# Patient Record
Sex: MALE | Race: WHITE | Employment: UNEMPLOYED | URBAN - METROPOLITAN AREA
[De-identification: names, ages, dates, MRNs, and addresses within clinical notes are randomized per-mention and may not be internally consistent; named-entity substitution may affect disease eponyms.]

---

## 2017-01-03 RX ORDER — ATENOLOL 25 MG/1
TABLET ORAL
Qty: 180 TABLET | Refills: 0 | Status: SHIPPED | OUTPATIENT
Start: 2017-01-03 | End: 2017-11-13

## 2017-02-14 ENCOUNTER — APPOINTMENT (OUTPATIENT)
Dept: LAB | Age: 52
End: 2017-02-14
Attending: INTERNAL MEDICINE
Payer: COMMERCIAL

## 2017-02-14 DIAGNOSIS — Z00.00 PHYSICAL EXAM, ANNUAL: ICD-10-CM

## 2017-02-14 DIAGNOSIS — R79.89 ELEVATED LIVER FUNCTION TESTS: ICD-10-CM

## 2017-02-14 LAB
ALBUMIN SERPL-MCNC: 3.9 G/DL (ref 3.5–4.8)
ALP LIVER SERPL-CCNC: 100 U/L (ref 45–117)
ALT SERPL-CCNC: 95 U/L (ref 17–63)
AST SERPL-CCNC: 62 U/L (ref 15–41)
BILIRUB DIRECT SERPL-MCNC: 0.1 MG/DL (ref 0.1–0.5)
BILIRUB SERPL-MCNC: 0.4 MG/DL (ref 0.1–2)
M PROTEIN MFR SERPL ELPH: 7.8 G/DL (ref 6.1–8.3)

## 2017-02-14 PROCEDURE — 80061 LIPID PANEL: CPT

## 2017-02-15 ENCOUNTER — OFFICE VISIT (OUTPATIENT)
Dept: INTERNAL MEDICINE CLINIC | Facility: CLINIC | Age: 52
End: 2017-02-15

## 2017-02-15 VITALS
HEART RATE: 60 BPM | RESPIRATION RATE: 12 BRPM | TEMPERATURE: 98 F | SYSTOLIC BLOOD PRESSURE: 130 MMHG | WEIGHT: 304.38 LBS | HEIGHT: 69 IN | DIASTOLIC BLOOD PRESSURE: 82 MMHG | BODY MASS INDEX: 45.08 KG/M2

## 2017-02-15 DIAGNOSIS — Z00.00 PHYSICAL EXAM, ANNUAL: ICD-10-CM

## 2017-02-15 DIAGNOSIS — Z23 NEED FOR VACCINATION: ICD-10-CM

## 2017-02-15 LAB
CHOLEST SMN-MCNC: 230 MG/DL (ref ?–200)
HDLC SERPL-MCNC: 40 MG/DL (ref 45–?)
HDLC SERPL: 5.75 {RATIO} (ref ?–4.97)
LDLC SERPL CALC-MCNC: 154 MG/DL (ref ?–130)
NONHDLC SERPL-MCNC: 190 MG/DL (ref ?–130)
TRIGLYCERIDES: 179 MG/DL (ref ?–150)
VLDL: 36 MG/DL (ref 5–40)

## 2017-02-15 PROCEDURE — 90686 IIV4 VACC NO PRSV 0.5 ML IM: CPT | Performed by: INTERNAL MEDICINE

## 2017-02-15 PROCEDURE — 99396 PREV VISIT EST AGE 40-64: CPT | Performed by: INTERNAL MEDICINE

## 2017-02-15 PROCEDURE — 90471 IMMUNIZATION ADMIN: CPT | Performed by: INTERNAL MEDICINE

## 2017-02-15 PROCEDURE — 90472 IMMUNIZATION ADMIN EACH ADD: CPT | Performed by: INTERNAL MEDICINE

## 2017-02-15 PROCEDURE — 90714 TD VACC NO PRESV 7 YRS+ IM: CPT | Performed by: INTERNAL MEDICINE

## 2017-02-15 RX ORDER — ONDANSETRON 4 MG/1
TABLET, FILM COATED ORAL
Refills: 5 | COMMUNITY
Start: 2016-12-21

## 2017-02-15 RX ORDER — ZOLMITRIPTAN 5 MG/1
TABLET, ORALLY DISINTEGRATING ORAL
Refills: 5 | COMMUNITY
Start: 2016-12-22

## 2017-02-15 NOTE — PROGRESS NOTES
[facility]    HPI:   Kirsten Szymanski is a 54year old male who presents for a complete physical exam.      Htn: okay today. Taking meds regularly. Hyperlipidemia: diet control. Taking omega 3 supplements. Depression: seeing psych.      Drena Goodell Disp: 30 tablet Rfl: 0   DiphenhydrAMINE HCl (BENADRYL) 25 MG Oral Tab Take 25 mg by mouth every 6 (six) hours as needed.  Disp:  Rfl:       Past Medical History   Diagnosis Date   • Unspecified essential hypertension    • Environmental allergies    • Multi Status: Never Used                        Alcohol Use: No              Occ: marketing. : yes.    Exercise: minimal.  Diet: watches minimally     REVIEW OF SYSTEMS:   GENERAL: feels well otherwise  SKIN: denies any unusual skin lesions  EYES:denies bl CREATSERUM 0.92 11/18/2016 01:53 PM   CA 9.1 11/18/2016 01:53 PM   ALB 3.9 02/14/2017 01:07 PM   TP 7.8 02/14/2017 01:07 PM   ALKPHO 100 02/14/2017 01:07 PM   AST 62* 02/14/2017 01:07 PM   ALT 95* 02/14/2017 01:07 PM   BILT 0.4 02/14/2017 01:07 PM   TSH

## 2017-04-02 ENCOUNTER — HOSPITAL ENCOUNTER (EMERGENCY)
Facility: HOSPITAL | Age: 52
Discharge: HOME | End: 2017-04-02
Attending: EMERGENCY MEDICINE
Payer: COMMERCIAL

## 2017-04-02 ENCOUNTER — APPOINTMENT (OUTPATIENT)
Dept: GENERAL RADIOLOGY | Facility: HOSPITAL | Age: 52
End: 2017-04-02
Attending: EMERGENCY MEDICINE
Payer: COMMERCIAL

## 2017-04-02 VITALS
RESPIRATION RATE: 18 BRPM | DIASTOLIC BLOOD PRESSURE: 86 MMHG | HEIGHT: 70 IN | SYSTOLIC BLOOD PRESSURE: 135 MMHG | TEMPERATURE: 98 F | OXYGEN SATURATION: 97 % | BODY MASS INDEX: 42.52 KG/M2 | WEIGHT: 297 LBS | HEART RATE: 59 BPM

## 2017-04-02 DIAGNOSIS — M54.16 LUMBAR RADICULOPATHY: Primary | ICD-10-CM

## 2017-04-02 PROCEDURE — 99284 EMERGENCY DEPT VISIT MOD MDM: CPT

## 2017-04-02 PROCEDURE — 73502 X-RAY EXAM HIP UNI 2-3 VIEWS: CPT

## 2017-04-02 PROCEDURE — 72110 X-RAY EXAM L-2 SPINE 4/>VWS: CPT

## 2017-04-02 RX ORDER — CYCLOBENZAPRINE HCL 10 MG
10 TABLET ORAL 3 TIMES DAILY PRN
Qty: 20 TABLET | Refills: 0 | Status: SHIPPED | OUTPATIENT
Start: 2017-04-02 | End: 2017-04-07

## 2017-04-02 RX ORDER — HYDROCODONE BITARTRATE AND ACETAMINOPHEN 5; 325 MG/1; MG/1
1 TABLET ORAL ONCE
Status: COMPLETED | OUTPATIENT
Start: 2017-04-02 | End: 2017-04-02

## 2017-04-02 RX ORDER — NAPROXEN 500 MG/1
500 TABLET ORAL 2 TIMES DAILY PRN
Qty: 20 TABLET | Refills: 0 | Status: SHIPPED | OUTPATIENT
Start: 2017-04-02 | End: 2017-04-14

## 2017-04-02 RX ORDER — METHYLPREDNISOLONE 4 MG/1
TABLET ORAL
Qty: 1 PACKAGE | Refills: 0 | Status: SHIPPED | OUTPATIENT
Start: 2017-04-02 | End: 2017-04-07

## 2017-04-02 NOTE — ED INITIAL ASSESSMENT (HPI)
Pt reports unable to get out of bed yesterday due to severe shooting pain inside of right hip, radiating to groin and down the leg. Pt reports no pain with ambulation only when laying down or getting up from a laying positing.

## 2017-04-02 NOTE — ED PROVIDER NOTES
Patient Seen in: [facility] Emergency Department    History   Patient presents with:  Back Pain (musculoskeletal)    Stated Complaint: back pain/groin    HPI    Patient is a 79-year-old with a history of hypertension, migraines, obesity, MTHFR defic GI ENDOSCOPY,BIOPSY  5/27/2009       Medications :   Cyclobenzaprine HCl 10 MG Oral Tab,  Take 1 tablet (10 mg total) by mouth 3 (three) times daily as needed for Muscle spasms.    methylPREDNISolone 4 MG Oral Tablet Therapy Pack,  Dosepack: use as directed Maternal Grandmother    • s dementia[other] [OTHER] Maternal Grandmother    • alzheimer's dementia[other] [OTHER] Maternal Grandfather    • lung cancer[other] [OTHER] Maternal Aunt    • Heart Attack Maternal Uncle      smoker   • Heart Disease Neg degeneration with bilateral L5-S1 disc degeneration  FINDINGS: Osteophytes are noted at the L5-S1 junction. Mild disc space narrowing at L4-5. Vertebral height otherwise within normal limits. Small anterior osteophytes L2-L3 and at T12-L1.  No lytic or devan 2 (two) times daily as needed.   Qty: 20 tablet Refills: 0

## 2017-04-02 NOTE — ED NOTES
Pt sitting in wheelchair - has no needs at this time.  Pt is aware we are just waiting on xray results  Family in room with pt

## 2017-04-03 ENCOUNTER — TELEPHONE (OUTPATIENT)
Dept: INTERNAL MEDICINE CLINIC | Facility: CLINIC | Age: 52
End: 2017-04-03

## 2017-04-03 RX ORDER — HYDROCODONE BITARTRATE AND ACETAMINOPHEN 5; 325 MG/1; MG/1
TABLET ORAL
Qty: 10 TABLET | Refills: 0 | Status: SHIPPED | OUTPATIENT
Start: 2017-04-03 | End: 2017-04-07 | Stop reason: DRUGHIGH

## 2017-04-03 NOTE — TELEPHONE ENCOUNTER
Reviewed ER notes. Pt with radiculopathy and low back pain. He is already on a lot of meds such as clonazepam, ritalin, ambien, lyrica. Pain not well controlled with naproxen.  We can try norco but he has to be very careful as it can increase drowsiness and

## 2017-04-03 NOTE — TELEPHONE ENCOUNTER
Spoke to pt, agreeable to plan below. Appt made for 4/7/17. Pt wife Claire Szymanski is picking up script.

## 2017-04-03 NOTE — TELEPHONE ENCOUNTER
Pt called. States that he was in [facility] yesterday with Right groin pain radiating down Right leg. See 4/2/17 ED encounter. Pt states that he is taking the Medrol, Cyclobenzaprine, and Naproxen.  States that he has pain (10/10) when he lies down/stands u

## 2017-04-05 ENCOUNTER — HOSPITAL ENCOUNTER (EMERGENCY)
Facility: HOSPITAL | Age: 52
Discharge: HOME | End: 2017-04-05
Attending: EMERGENCY MEDICINE
Payer: COMMERCIAL

## 2017-04-05 ENCOUNTER — TELEPHONE (OUTPATIENT)
Dept: INTERNAL MEDICINE CLINIC | Facility: CLINIC | Age: 52
End: 2017-04-05

## 2017-04-05 VITALS
WEIGHT: 296.94 LBS | OXYGEN SATURATION: 95 % | RESPIRATION RATE: 16 BRPM | DIASTOLIC BLOOD PRESSURE: 84 MMHG | BODY MASS INDEX: 43 KG/M2 | SYSTOLIC BLOOD PRESSURE: 127 MMHG | HEART RATE: 55 BPM | TEMPERATURE: 98 F

## 2017-04-05 DIAGNOSIS — M54.40 BACK PAIN OF LUMBAR REGION WITH SCIATICA: Primary | ICD-10-CM

## 2017-04-05 PROCEDURE — 99284 EMERGENCY DEPT VISIT MOD MDM: CPT

## 2017-04-05 PROCEDURE — 96372 THER/PROPH/DIAG INJ SC/IM: CPT

## 2017-04-05 RX ORDER — HYDROCODONE BITARTRATE AND ACETAMINOPHEN 10; 325 MG/1; MG/1
1-2 TABLET ORAL EVERY 4 HOURS PRN
Qty: 20 TABLET | Refills: 0 | Status: SHIPPED | OUTPATIENT
Start: 2017-04-05 | End: 2017-04-07

## 2017-04-05 RX ORDER — KETOROLAC TROMETHAMINE 30 MG/ML
30 INJECTION, SOLUTION INTRAMUSCULAR; INTRAVENOUS ONCE
Status: COMPLETED | OUTPATIENT
Start: 2017-04-05 | End: 2017-04-05

## 2017-04-05 RX ORDER — MORPHINE SULFATE 4 MG/ML
4 INJECTION, SOLUTION INTRAMUSCULAR; INTRAVENOUS ONCE
Status: COMPLETED | OUTPATIENT
Start: 2017-04-05 | End: 2017-04-05

## 2017-04-05 NOTE — TELEPHONE ENCOUNTER
Agree with below. Okay to take meds as prescribed. If worsening go to the ER. No other recommendations.

## 2017-04-05 NOTE — TELEPHONE ENCOUNTER
Patient was seen in [facility] on Sunday for a back issue. He states that his leg collapsed from under him today, however he didn't fall to the ground severely - he let himself down. Patient reports Flexeril has been working very well in managing the pain.

## 2017-04-05 NOTE — TELEPHONE ENCOUNTER
Spoke to pt. Pt was in ER 4/2/17 for lumbar pain. Rx'd Flexeril, Medrol dose pack, and Naproxen. Pt was given Rx 4/3/17 for Norco.  Pt states about 45 min ago he had sharp shooting pain in Rt hip. Pain caused him to collapse to floor.   Pt did not fall

## 2017-04-06 NOTE — ED PROVIDER NOTES
Patient Seen in: [facility] Emergency Department    History   Patient presents with:  Back Pain (musculoskeletal)    Stated Complaint: low back pain/pinch nerve    HPI    45-year-old male here for help with back pain.   Has had intermittent back pain by mouth every 4 (four) hours as needed for Pain. HYDROcodone-acetaminophen (NORCO) 5-325 MG Oral Tab,  Take 1 tablet in the morning and 1 tablet in the afternoon as needed. LYRICA 25 MG Oral Cap,  Take 25 mg by mouth.  Take 1 tablet every 3 hours while • Alcohol and Other Disorders Associated Paternal Grandfather    • Stroke Paternal Grandfather    • alzheimer[other] [OTHER] Maternal Grandmother    • s dementia[other] [OTHER] Maternal Grandmother    • alzheimer's dementia[other] [OTHER] Maternal Grandf No midline spinal tenderness along cervical, thoracic, lumbar, sacral spine. No pain to percussion. 5 out of 5 strength with bilateral hip flexion, hip extension, knee flexion, knee extension.   5 out of 5 strength with plantar flexion and dorsiflexio

## 2017-04-06 NOTE — ED INITIAL ASSESSMENT (HPI)
Presents with back pain , today right leg gave out and patient fell at 1230 today. Increased difficulty with mobility. Denies LOC, no bowel or bladder incontince. Seen in Ed on Sunday for back pain that started on Saturday, dx with lumbar radiculopathy

## 2017-04-07 ENCOUNTER — TELEPHONE (OUTPATIENT)
Dept: SURGERY | Facility: CLINIC | Age: 52
End: 2017-04-07

## 2017-04-07 ENCOUNTER — OFFICE VISIT (OUTPATIENT)
Dept: INTERNAL MEDICINE CLINIC | Facility: CLINIC | Age: 52
End: 2017-04-07

## 2017-04-07 VITALS
SYSTOLIC BLOOD PRESSURE: 110 MMHG | HEIGHT: 69 IN | BODY MASS INDEX: 44.98 KG/M2 | WEIGHT: 303.69 LBS | DIASTOLIC BLOOD PRESSURE: 70 MMHG | TEMPERATURE: 98 F | RESPIRATION RATE: 12 BRPM | HEART RATE: 64 BPM

## 2017-04-07 DIAGNOSIS — M54.5 ACUTE RIGHT-SIDED LOW BACK PAIN, WITH SCIATICA PRESENCE UNSPECIFIED: Primary | ICD-10-CM

## 2017-04-07 PROCEDURE — 99214 OFFICE O/P EST MOD 30 MIN: CPT | Performed by: INTERNAL MEDICINE

## 2017-04-07 RX ORDER — CYCLOBENZAPRINE HCL 10 MG
10 TABLET ORAL 3 TIMES DAILY PRN
Qty: 20 TABLET | Refills: 0 | Status: SHIPPED | OUTPATIENT
Start: 2017-04-07 | End: 2017-04-14

## 2017-04-07 RX ORDER — METHYLPREDNISOLONE 4 MG/1
TABLET ORAL
Qty: 1 PACKAGE | Refills: 0 | Status: SHIPPED | OUTPATIENT
Start: 2017-04-07 | End: 2017-04-12

## 2017-04-07 RX ORDER — HYDROCODONE BITARTRATE AND ACETAMINOPHEN 10; 325 MG/1; MG/1
1-2 TABLET ORAL EVERY 4 HOURS PRN
Qty: 20 TABLET | Refills: 0 | Status: SHIPPED | OUTPATIENT
Start: 2017-04-07 | End: 2017-04-10

## 2017-04-07 NOTE — PROGRESS NOTES
[facility]    CHIEF COMPLAINT:  Patient presents with:  ER F/U        HISTORY OF PRESENT ILLNESS:  Complains of low back pain for about 3 weeks. Started when he got into a car in a funny position.  He was doing okay with it but it got worse last w Tab TAKE 1 TABLET BY MOUTH FIVE TIMES DAILY Disp: 150 tablet Rfl: 0   Methylphenidate HCl (RITALIN) 5 MG Oral Tab take 2 by mouth twice daily and 1 by mouth each afternoon.  Disp: 150 tablet Rfl: 0   ATENOLOL 25 MG Oral Tab TAKE 1 TABLET (25 MG TOTAL) BY MO LDL Cholesterol 154 (H) <130 mg/dL   VLDL 36 5-40 mg/dL   Chol/HDL Ratio 5.75 (H) <4.97   Non HDL Chol 190 (H) <130 mg/dL            ASSESSMENT AND PLAN:  1.  Acute right-sided low back pain, with sciatica presence unspecified  norco and flexeril is worki

## 2017-04-10 ENCOUNTER — TELEPHONE (OUTPATIENT)
Dept: INTERNAL MEDICINE CLINIC | Facility: CLINIC | Age: 52
End: 2017-04-10

## 2017-04-10 DIAGNOSIS — M54.5 ACUTE RIGHT-SIDED LOW BACK PAIN, WITH SCIATICA PRESENCE UNSPECIFIED: Primary | ICD-10-CM

## 2017-04-10 RX ORDER — HYDROCODONE BITARTRATE AND ACETAMINOPHEN 10; 325 MG/1; MG/1
1-2 TABLET ORAL EVERY 4 HOURS PRN
Qty: 45 TABLET | Refills: 0 | Status: SHIPPED | OUTPATIENT
Start: 2017-04-10 | End: 2017-05-05

## 2017-04-10 NOTE — TELEPHONE ENCOUNTER
Noted below. Will do rx for another week for norco. He may . Please let pt know that I would recommend he at least go ahead and see the PA at Dr. Lisa Barr office so that he can get set up for medication management while he waits to see Dr. Judith Olivera.  He

## 2017-04-10 NOTE — TELEPHONE ENCOUNTER
María Mae scheduled an appointment with Dr. Marilyn Salinas with the pain clinic. He was not able to get the appointment until April 28. They are on the wait list for cancellations. In the meantime, María Mae only has enough pain medicine for today. Please advise.

## 2017-04-10 NOTE — TELEPHONE ENCOUNTER
Spoke with pt. Advised as below. Pt voiced understanding. Pt states that second Medrol dose naldo is reducing pain slightly. Pt states that his wife was contacted by the pain clinic and he may have sooner appt this week, 4/13/17.     Pt scheduled a 4/14/17

## 2017-04-10 NOTE — TELEPHONE ENCOUNTER
pts wife Елена Hammer scheduled NP appt 4/28 with Dr Eb Oliver for extreme back pain, referred by Dr Seals Needs

## 2017-04-10 NOTE — TELEPHONE ENCOUNTER
Pt has appt with pain clinic for April 28, on wait list. Out of pain med after today, requesting med until pain clinic appt.      Last OV pertinent to medication: 4/7/17  Last refill date: 4/7/17     #/refills: 20 + 0 refills  When pt was asked to return fo

## 2017-04-10 NOTE — TELEPHONE ENCOUNTER
Please call pain clinic and see if there is any way he can get in before April 28. He is in a lot of pain, has had 2 steroid dose packs and is on a 10mg norco for pain and is still in a lot of pain. I would like him to be seen sooner.

## 2017-04-10 NOTE — TELEPHONE ENCOUNTER
Spoke with Stephanie Do,  at [address] [place] [address]. States that pt has soonest new pt appt with Dr. Kieran Gardiner. States that pt is at top of cancellation list and they are hopeful for a cancellation this week.  States that pt was offered sooner ov with PA at their office

## 2017-04-13 ENCOUNTER — OFFICE VISIT (OUTPATIENT)
Dept: SURGERY | Facility: CLINIC | Age: 52
End: 2017-04-13

## 2017-04-13 VITALS
HEIGHT: 70 IN | BODY MASS INDEX: 43.38 KG/M2 | HEART RATE: 61 BPM | SYSTOLIC BLOOD PRESSURE: 128 MMHG | DIASTOLIC BLOOD PRESSURE: 76 MMHG | RESPIRATION RATE: 18 BRPM | WEIGHT: 303 LBS

## 2017-04-13 DIAGNOSIS — M54.16 LUMBAR RADICULITIS: Primary | ICD-10-CM

## 2017-04-13 PROCEDURE — 99205 OFFICE O/P NEW HI 60 MIN: CPT | Performed by: PHYSICIAN ASSISTANT

## 2017-04-13 NOTE — PATIENT INSTRUCTIONS
Refill policies:    • Allow 2 business days for refills; controlled substances may take longer.   • Contact your pharmacy at least 5 days prior to running out of medication and have them send an electronic request or submit request through the “request re insurance carrier to obtain pre-certification or prior authorization. Unfortunately, [facility] has seen an increase in denial of payment even though the procedure/test has been pre-certified.   You are strongly encouraged to contact your insurance carrier to v

## 2017-04-14 ENCOUNTER — OFFICE VISIT (OUTPATIENT)
Dept: INTERNAL MEDICINE CLINIC | Facility: CLINIC | Age: 52
End: 2017-04-14

## 2017-04-14 VITALS
WEIGHT: 307.19 LBS | HEIGHT: 70 IN | SYSTOLIC BLOOD PRESSURE: 128 MMHG | DIASTOLIC BLOOD PRESSURE: 90 MMHG | RESPIRATION RATE: 12 BRPM | HEART RATE: 72 BPM | TEMPERATURE: 98 F | BODY MASS INDEX: 43.98 KG/M2

## 2017-04-14 DIAGNOSIS — M54.5 ACUTE RIGHT-SIDED LOW BACK PAIN, WITH SCIATICA PRESENCE UNSPECIFIED: Primary | ICD-10-CM

## 2017-04-14 PROCEDURE — 99213 OFFICE O/P EST LOW 20 MIN: CPT | Performed by: INTERNAL MEDICINE

## 2017-04-14 RX ORDER — CYCLOBENZAPRINE HCL 10 MG
10 TABLET ORAL 3 TIMES DAILY PRN
Qty: 20 TABLET | Refills: 0 | Status: SHIPPED | OUTPATIENT
Start: 2017-04-14 | End: 2017-05-05

## 2017-04-14 RX ORDER — NAPROXEN 500 MG/1
500 TABLET ORAL 2 TIMES DAILY PRN
Qty: 20 TABLET | Refills: 0 | Status: SHIPPED | OUTPATIENT
Start: 2017-04-14 | End: 2017-04-21

## 2017-04-14 RX ORDER — NAPROXEN 500 MG/1
TABLET ORAL
Qty: 180 TABLET | Refills: 0 | OUTPATIENT
Start: 2017-04-14

## 2017-04-14 NOTE — PROGRESS NOTES
[facility]    CHIEF COMPLAINT:  Patient presents with: Follow - Up        HISTORY OF PRESENT ILLNESS:  Here for follow up. Back pain is improved. He finished the second course of medrol dose pack.  He has taken only 1 norco today and has been f EVERY MORNING. Disp: 30 tablet Rfl: 0   DiphenhydrAMINE HCl (BENADRYL) 25 MG Oral Tab Take 25 mg by mouth every 6 (six) hours as needed.  Disp:  Rfl:        PAST MEDICAL, SOCIAL, AND FAMILY HISTORY:  Tobacco:    Smoking status: Never Smoker    Smokeless tob

## 2017-04-14 NOTE — H&P
Name: Lori Szymanski   : 10/20/1965   DOS: 2017     Chief complaint: Patient presents with:  Low Back Pain: radiating into bilateral legs      History of present illness:  Lori Szymanski is a 54year old male complaining of  Sharp stabbing shooting rubin • Vasovagal syncope    • Knee pain, left 4/3/2007   • Seasonal allergies    • Ocular migraine    • Hypertension    • Allergic rhinitis 10/3/2006   • Sleep difficulties 1/19/2007   • ED (erectile dysfunction)    • Abscess of thigh 11/27/2007     left   • Hives, Rash, Tightness in Throat  Latex                   Rash    Comment:Blister with shortness of breath  Melons                  Hives, Rash, Tightness in Throat  Other                       Comment:artificial sweetners cause gastric distress seizures, speech disorders, loss of balance or any  other neurologic problems. Genitourinary:  Denies dysuria, hematuria. Musculoskeletal:  + in HPI  Vascular: Negative.  Specifically denies phlebitis, DVT, PE, bleeding problems, hemophilia or any other v        =====  CONCLUSION:  L5-S1 degeneration with bilateral L5-S1 disc degeneration.              Dictated by: Silvia Ocampo MD on 4/02/2017 at 16:12        Approved by: Silvia Ocampo MD         ________________________________________________________ lidocaine. It should not happen for topical numbing. I currently cannot justify taking over his norco because I currently do not have evidence to support his source of pain. I told pt he should continue getting his medication from Dr Mally Odom office.

## 2017-04-17 ENCOUNTER — TELEPHONE (OUTPATIENT)
Dept: NEUROLOGY | Facility: CLINIC | Age: 52
End: 2017-04-17

## 2017-04-17 NOTE — TELEPHONE ENCOUNTER
Authorization #[id number] for MRI Lumbar at [facility] exp 5-13-17    Test scheduled for 4-15-17

## 2017-04-20 ENCOUNTER — HOSPITAL ENCOUNTER (OUTPATIENT)
Dept: MRI IMAGING | Facility: HOSPITAL | Age: 52
Discharge: HOME | End: 2017-04-20
Attending: PHYSICIAN ASSISTANT
Payer: COMMERCIAL

## 2017-04-20 DIAGNOSIS — M54.16 LUMBAR RADICULITIS: ICD-10-CM

## 2017-04-20 PROCEDURE — 72148 MRI LUMBAR SPINE W/O DYE: CPT

## 2017-04-24 ENCOUNTER — TELEPHONE (OUTPATIENT)
Dept: INTERNAL MEDICINE CLINIC | Facility: CLINIC | Age: 52
End: 2017-04-24

## 2017-04-24 ENCOUNTER — TELEPHONE (OUTPATIENT)
Dept: SURGERY | Facility: CLINIC | Age: 52
End: 2017-04-24

## 2017-04-24 NOTE — TELEPHONE ENCOUNTER
Patient's wife called and wanted to let Dr. Justine Freitas know that they got in earlier then expected to get his MRI done, (last Thursday).  Belgica Do, wanted to know if Dr. Justine Freitas has yet looked at the results, once Dr. Justine Freitas looks at them could our office call her (she i

## 2017-04-24 NOTE — TELEPHONE ENCOUNTER
Spoke w/ pt regarding general timeline of MRI results and provider recommendation. Pt verbalized understanding and appreciation. No further questions.

## 2017-04-24 NOTE — TELEPHONE ENCOUNTER
MRI reviewed. Showed a disc herniation causing severe spinal stenosis. He will need to follow up with the pain clinic regarding this finding. He may need evaluation by neurosurgery depending on what the pain specialists think.

## 2017-04-26 ENCOUNTER — TELEPHONE (OUTPATIENT)
Dept: SURGERY | Facility: CLINIC | Age: 52
End: 2017-04-26

## 2017-04-26 DIAGNOSIS — M54.16 LUMBAR RADICULITIS: Primary | ICD-10-CM

## 2017-04-26 NOTE — TELEPHONE ENCOUNTER
I have discussed results of lumbar MRI with Dr. Guicho Butt. He recommends a series of 3 right transforaminal lumbar epidural steroid injections at L2-3 and L3-4.   I have entered case request with sedation, patient is understanding and willing to proceed with in

## 2017-04-26 NOTE — TELEPHONE ENCOUNTER
Pt spoke w/ provider and recommended for procedures. Call transferred and pt scheduled. Reviewed pre-procedural instructions. Copy sent to pt via [employer].       [address]  PRE-PROCEDURE INSTRUCTIONS WITH IV SEDATION    Appointment Date: ? Eliquis (Apixaban) 3 days  ? Xarelto (Rivaroxaban) 3 days  ? Lovenox (Enoxaparin) 24 hours  ? Aspirin  ? 81mg 24 hours  ? Greater than 81 mg (325mg) 7 days  ? Coumadin Procedure may be cancelled if INR is elevated. ? Epidural ____ - 7 days  ?  Others 5 It is normal to have increased pain at injection site for up to 3-5 days after procedure, you can use heat for the first 24 hours (20 minutes on 20 minutes off) after the first 24 hours you can use heat or cold.          Epidural Injections  What is an epid No, this procedure is done with a local anesthetic. Some patients choose to receive intravenous sedation that can make the procedure easier to tolerate.  This type of sedation may cause amnesia and patients may not remember parts or all of the actual proced Most patients do not receive more than three injections in a six-month period. This is because the effects of the medication injected frequently last for six months or more.  If three injections have not helped you very much, you may be a candidate for a di

## 2017-04-27 ENCOUNTER — TELEPHONE (OUTPATIENT)
Dept: NEUROLOGY | Facility: CLINIC | Age: 52
End: 2017-04-27

## 2017-04-27 NOTE — TELEPHONE ENCOUNTER
Spoke to Krys at [facility], codes 34112-61960 are valid and billable, no prior authorization or predetermination required.  Call reference: [id number] call time 35:49

## 2017-04-28 ENCOUNTER — OFFICE VISIT (OUTPATIENT)
Dept: INTERNAL MEDICINE CLINIC | Facility: CLINIC | Age: 52
End: 2017-04-28

## 2017-04-28 VITALS
RESPIRATION RATE: 12 BRPM | HEIGHT: 70 IN | TEMPERATURE: 98 F | BODY MASS INDEX: 43.23 KG/M2 | SYSTOLIC BLOOD PRESSURE: 142 MMHG | HEART RATE: 72 BPM | DIASTOLIC BLOOD PRESSURE: 88 MMHG | WEIGHT: 302 LBS

## 2017-04-28 DIAGNOSIS — M54.5 ACUTE RIGHT-SIDED LOW BACK PAIN, WITH SCIATICA PRESENCE UNSPECIFIED: Primary | ICD-10-CM

## 2017-04-28 PROCEDURE — 99213 OFFICE O/P EST LOW 20 MIN: CPT | Performed by: INTERNAL MEDICINE

## 2017-04-28 NOTE — PROGRESS NOTES
[facility]    CHIEF COMPLAINT:  Patient presents with: Follow - Up        HISTORY OF PRESENT ILLNESS:  Here for follow up. Back pain is improving slowly. His pain is now a 4/10. Not taking norco every day now. Not taking flexeril every day.  Ta Used       PHYSICAL EXAM:  /88 mmHg  Pulse 72  Temp(Src) 98 °F (36.7 °C) (Oral)  Resp 12  Ht 70\"  Wt 302 lb  BMI 43.33 kg/m2   GENERAL: well developed, well nourished,in no apparent distress  MUSCULOSKELETAL: no low back paraspinal tenderness.      D

## 2017-05-05 ENCOUNTER — TELEPHONE (OUTPATIENT)
Dept: INTERNAL MEDICINE CLINIC | Facility: CLINIC | Age: 52
End: 2017-05-05

## 2017-05-05 DIAGNOSIS — M54.5 ACUTE RIGHT-SIDED LOW BACK PAIN, WITH SCIATICA PRESENCE UNSPECIFIED: Primary | ICD-10-CM

## 2017-05-05 RX ORDER — CYCLOBENZAPRINE HCL 10 MG
10 TABLET ORAL 3 TIMES DAILY PRN
Qty: 21 TABLET | Refills: 0 | Status: SHIPPED | OUTPATIENT
Start: 2017-05-05 | End: 2017-05-23

## 2017-05-05 RX ORDER — HYDROCODONE BITARTRATE AND ACETAMINOPHEN 10; 325 MG/1; MG/1
1-2 TABLET ORAL 2 TIMES DAILY PRN
Qty: 28 TABLET | Refills: 0 | Status: SHIPPED | OUTPATIENT
Start: 2017-05-05 | End: 2019-08-28 | Stop reason: ALTCHOICE

## 2017-05-05 NOTE — TELEPHONE ENCOUNTER
Spoke with pt at length.  Has June 5th epidural scheduled at pain clinic but no pain management through them until then, but says he was advised by pain clinic that they cannot do Norco. Pt states that 2 tabs Norco with 1 tab flexeril has been effective in

## 2017-05-05 NOTE — TELEPHONE ENCOUNTER
Pt needs refill of flexeril, has 3 left, was told to check in with our office if he needs refills. Pt would also like to review pattern of taking flexeril and norco, states that pain is getting worse again. Please advise.  Thank you

## 2017-05-05 NOTE — TELEPHONE ENCOUNTER
Spoke with pt, advised of below. Suggested he try 1.5 tabs Norco to see if that would provide any relief. Pt stated understanding of doctors recommendation and will call next week to re-assess needs. Norco Rx at front for .  Flexeril sent to pharmacy

## 2017-05-05 NOTE — TELEPHONE ENCOUNTER
rx done for norco and flexeril for another week. Continue to wean as tolerated. Call me next week for refill and we can reassess how much he needs.    Okay to  norco.

## 2017-05-23 DIAGNOSIS — M54.5 ACUTE RIGHT-SIDED LOW BACK PAIN, WITH SCIATICA PRESENCE UNSPECIFIED: Primary | ICD-10-CM

## 2017-05-23 NOTE — TELEPHONE ENCOUNTER
Patient is going for pain injections in his back on June 5th. He really isn't taking the norco and has plenty but he is taking the Flexeril every other day and only has about 10 left. Can he have a refill to get him to appt.   Also wants to know that it is

## 2017-05-23 NOTE — TELEPHONE ENCOUNTER
See pt message below.     Flexeril refill request:  Last OV pertinent to medication: 4/28/17  Last refill date: 5/5/17     #/refills: 21 tabs, 0 refills  When pt was asked to return for OV: 1 month   Upcoming appt/reason: none     Lab Results  Component Sandra

## 2017-05-24 RX ORDER — CYCLOBENZAPRINE HCL 10 MG
10 TABLET ORAL 3 TIMES DAILY PRN
Qty: 21 TABLET | Refills: 0 | Status: SHIPPED | OUTPATIENT
Start: 2017-05-24 | End: 2017-05-31

## 2017-06-05 ENCOUNTER — APPOINTMENT (OUTPATIENT)
Dept: GENERAL RADIOLOGY | Facility: HOSPITAL | Age: 52
End: 2017-06-05
Attending: ANESTHESIOLOGY
Payer: COMMERCIAL

## 2017-06-05 ENCOUNTER — SURGERY (OUTPATIENT)
Age: 52
End: 2017-06-05

## 2017-06-05 ENCOUNTER — HOSPITAL ENCOUNTER (OUTPATIENT)
Facility: HOSPITAL | Age: 52
Setting detail: HOSPITAL OUTPATIENT SURGERY
Discharge: HOME | End: 2017-06-05
Attending: ANESTHESIOLOGY | Admitting: ANESTHESIOLOGY
Payer: COMMERCIAL

## 2017-06-05 VITALS
OXYGEN SATURATION: 100 % | SYSTOLIC BLOOD PRESSURE: 152 MMHG | HEART RATE: 57 BPM | DIASTOLIC BLOOD PRESSURE: 91 MMHG | RESPIRATION RATE: 18 BRPM | TEMPERATURE: 98 F

## 2017-06-05 DIAGNOSIS — M54.16 LUMBAR RADICULITIS: ICD-10-CM

## 2017-06-05 PROCEDURE — 99152 MOD SED SAME PHYS/QHP 5/>YRS: CPT | Performed by: ANESTHESIOLOGY

## 2017-06-05 PROCEDURE — 3E0R33Z INTRODUCTION OF ANTI-INFLAMMATORY INTO SPINAL CANAL, PERCUTANEOUS APPROACH: ICD-10-PCS | Performed by: ANESTHESIOLOGY

## 2017-06-05 PROCEDURE — 3E0R3BZ INTRODUCTION OF ANESTHETIC AGENT INTO SPINAL CANAL, PERCUTANEOUS APPROACH: ICD-10-PCS | Performed by: ANESTHESIOLOGY

## 2017-06-05 RX ORDER — DEXAMETHASONE SODIUM PHOSPHATE 10 MG/ML
INJECTION, SOLUTION INTRAMUSCULAR; INTRAVENOUS AS NEEDED
Status: DISCONTINUED | OUTPATIENT
Start: 2017-06-05 | End: 2017-06-05 | Stop reason: HOSPADM

## 2017-06-05 RX ORDER — SODIUM CHLORIDE, SODIUM LACTATE, POTASSIUM CHLORIDE, CALCIUM CHLORIDE 600; 310; 30; 20 MG/100ML; MG/100ML; MG/100ML; MG/100ML
100 INJECTION, SOLUTION INTRAVENOUS CONTINUOUS
Status: DISCONTINUED | OUTPATIENT
Start: 2017-06-05 | End: 2017-06-05

## 2017-06-05 RX ORDER — LIDOCAINE HYDROCHLORIDE 10 MG/ML
INJECTION, SOLUTION EPIDURAL; INFILTRATION; INTRACAUDAL; PERINEURAL AS NEEDED
Status: DISCONTINUED | OUTPATIENT
Start: 2017-06-05 | End: 2017-06-05 | Stop reason: HOSPADM

## 2017-06-06 NOTE — OPERATIVE REPORT
[facility]  Operative Report  2017     Deidra Llanos Minor Patient Status:  Hospital [facility]patient Surgery    10/20/1965 MRN [id number]   Location [facility] Attending No att. providers found   [facility] Day # 0 PCP Karen the inferior aspect of the junction between the transverse process and pedicle of the right L2-L3 level atraumatically under fluoroscopic guidance. The needle was advanced into the anterior epidural space at this level.   The needle position was confirmed

## 2017-06-09 ENCOUNTER — TELEPHONE (OUTPATIENT)
Dept: SURGERY | Facility: CLINIC | Age: 52
End: 2017-06-09

## 2017-06-09 ENCOUNTER — TELEPHONE (OUTPATIENT)
Dept: INTERNAL MEDICINE CLINIC | Facility: CLINIC | Age: 52
End: 2017-06-09

## 2017-06-09 DIAGNOSIS — M54.16 LUMBAR RADICULITIS: Primary | ICD-10-CM

## 2017-06-09 NOTE — TELEPHONE ENCOUNTER
Spoke to patient, patient wants to thank Dr. Renata Mendosa and his staff, states he has had an amazing experience with our office, patient extremely appreciative.    Patient states after first TLESI, all radicular pain is entirely gone and he is able to move around

## 2017-06-09 NOTE — TELEPHONE ENCOUNTER
Patient wanted Dr Rosa Echols to know the epidural steroid injection he had on Monday with Dr Santos Siddiqui went extremely well. Dr Santos Siddiqui said patient could cancel the next 2 injections in series.   Dr Santos Siddiqui said his medication plan looks good and he can take flexoral as ne

## 2017-06-09 NOTE — TELEPHONE ENCOUNTER
He will need to follow up with dr. Ernestine Theodore. We were just covering him until he was able to see dr. Ernestine Theodore.

## 2017-06-12 NOTE — TELEPHONE ENCOUNTER
Glad to hear he got relief. PT would be a great option and I have placed an order for the PT 2 times a week for 8 weeks.

## 2017-06-23 ENCOUNTER — HOSPITAL ENCOUNTER (OUTPATIENT)
Dept: PHYSICAL THERAPY | Facility: HOSPITAL | Age: 52
Setting detail: THERAPIES SERIES
Discharge: HOME | End: 2017-06-23
Attending: NURSE PRACTITIONER
Payer: COMMERCIAL

## 2017-06-23 DIAGNOSIS — M54.16 LUMBAR RADICULITIS: Primary | ICD-10-CM

## 2017-06-23 PROCEDURE — 97110 THERAPEUTIC EXERCISES: CPT

## 2017-06-23 PROCEDURE — 97162 PT EVAL MOD COMPLEX 30 MIN: CPT

## 2017-06-23 NOTE — PROGRESS NOTES
SPINE EVALUATION:   Referring Physician: Dr. Stephie Mariano  Diagnosis: lumbar radiculitis Date of Service: 6/23/2017     PATIENT SUMMARY   Yevgeniy Szymanski is a 54year old y/o male who presents to therapy today with complaints of back pain.  Pt reports underwent e B    Piriformis extensibility limted B in part due to soft tissue compression in test positions    Special tests: bridge w/ recip leg raise w/ good rotational stabilization.       Today’s Treatment and Response:  Patient education provided on eval findings actively participate in planning and for this course of care. Thank you for your referral. Please co-sign or sign and return this letter via fax as soon as possible to [phone].  If you have any questions, please contact me at Dept: [phone] s

## 2017-06-26 ENCOUNTER — HOSPITAL ENCOUNTER (OUTPATIENT)
Dept: PHYSICAL THERAPY | Facility: HOSPITAL | Age: 52
Setting detail: THERAPIES SERIES
Discharge: HOME | End: 2017-06-26
Attending: NURSE PRACTITIONER
Payer: COMMERCIAL

## 2017-06-26 PROCEDURE — 97110 THERAPEUTIC EXERCISES: CPT

## 2017-06-26 NOTE — PROGRESS NOTES
Dx: LBP         Authorized # of Visits: 12        Next MD visit: none scheduled  Fall Risk: standard         Precautions: n/a             Subjective: sleeping well with exercises. Sore after eval, did ex Sunday w/o problem  Objective: began gym ex.  See Rx

## 2017-06-29 ENCOUNTER — HOSPITAL ENCOUNTER (OUTPATIENT)
Dept: PHYSICAL THERAPY | Facility: HOSPITAL | Age: 52
Setting detail: THERAPIES SERIES
Discharge: HOME | End: 2017-06-29
Attending: NURSE PRACTITIONER
Payer: COMMERCIAL

## 2017-06-29 PROCEDURE — 97110 THERAPEUTIC EXERCISES: CPT

## 2017-06-29 NOTE — PROGRESS NOTES
Dx: LBP         Authorized # of Visits: 12        Next MD visit: none scheduled  Fall Risk: standard         Precautions: n/a             Subjective: continues to sleep better with next.  Noticing improved ability to do some ADLs including changing sheets o

## 2017-07-03 RX ORDER — ATENOLOL 25 MG/1
TABLET ORAL
Qty: 180 TABLET | Refills: 1 | Status: SHIPPED | OUTPATIENT
Start: 2017-07-03 | End: 2017-11-06 | Stop reason: RX

## 2017-07-06 ENCOUNTER — HOSPITAL ENCOUNTER (OUTPATIENT)
Dept: PHYSICAL THERAPY | Facility: HOSPITAL | Age: 52
Setting detail: THERAPIES SERIES
Discharge: HOME | End: 2017-07-06
Attending: NURSE PRACTITIONER
Payer: COMMERCIAL

## 2017-07-06 PROCEDURE — 97110 THERAPEUTIC EXERCISES: CPT

## 2017-07-06 NOTE — PROGRESS NOTES
Dx: LBP         Authorized # of Visits: 12        Next MD visit: none scheduled  Fall Risk: standard         Precautions: n/a             Subjective: Patient reports \"I feel terrific today. Whatever we have bene working on has been helping. \" I feel Nataly Romero 10 L Supine modified piriformis stretch  [id number] L/R with manual stretch at knee    [id number] Full stretch L/R with self stretch with sheet behind knees to pull toward chest       LTR x 15 -- Supine self-HS stretch with sheet  [id number] L/R       Modified sc

## 2017-07-11 ENCOUNTER — APPOINTMENT (OUTPATIENT)
Dept: PHYSICAL THERAPY | Facility: HOSPITAL | Age: 52
End: 2017-07-11
Attending: NURSE PRACTITIONER
Payer: COMMERCIAL

## 2017-07-13 ENCOUNTER — HOSPITAL ENCOUNTER (OUTPATIENT)
Dept: PHYSICAL THERAPY | Facility: HOSPITAL | Age: 52
Setting detail: THERAPIES SERIES
Discharge: HOME | End: 2017-07-13
Attending: NURSE PRACTITIONER
Payer: COMMERCIAL

## 2017-07-13 PROCEDURE — 97110 THERAPEUTIC EXERCISES: CPT

## 2017-07-13 NOTE — PROGRESS NOTES
Dx: LBP         Authorized # of Visits: 12        Next MD visit: none scheduled  Fall Risk: standard         Precautions: n/a             Subjective: Patient reports was doing OK until drive into city for MD visit and hip a bump that caused inc LBP requiri piriformis stretch short duration, reps x 10      LTR x 15 -- Supine self-HS stretch with sheet  [id number] L/R       Modified sciatic glides at knee and ankle w/ strap and PT assist to support thigh x 15 L/R Seated sciatic sliders: knee flex w/ cerv flex, k

## 2017-07-17 ENCOUNTER — HOSPITAL ENCOUNTER (OUTPATIENT)
Dept: PHYSICAL THERAPY | Facility: HOSPITAL | Age: 52
Setting detail: THERAPIES SERIES
Discharge: HOME | End: 2017-07-17
Attending: NURSE PRACTITIONER
Payer: COMMERCIAL

## 2017-07-17 PROCEDURE — 97110 THERAPEUTIC EXERCISES: CPT

## 2017-07-17 NOTE — PROGRESS NOTES
Dx: LBP         Authorized # of Visits: 12        Next MD visit: none scheduled  Fall Risk: standard         Precautions: n/a             Subjective: reports cognitively not feeling as sharp.  Reports 2 sensations of sharp knee pain L descending stairs but reciprocally cont'd R     sdly hip abd (clam shell x 10 L/R > manual resist x 10 ea L/R cont'd w/ PT manual resist Sidelying clamshells   2x10 L/R with mod-max manual resist -- --     Seated flexion stretch w/ UE assist to return to  Upright x 10 HEP: advi

## 2017-07-20 ENCOUNTER — HOSPITAL ENCOUNTER (OUTPATIENT)
Dept: PHYSICAL THERAPY | Facility: HOSPITAL | Age: 52
Setting detail: THERAPIES SERIES
Discharge: HOME | End: 2017-07-20
Attending: NURSE PRACTITIONER
Payer: COMMERCIAL

## 2017-07-20 PROCEDURE — 97110 THERAPEUTIC EXERCISES: CPT

## 2017-07-20 NOTE — PROGRESS NOTES
Dx: LBP         Authorized # of Visits: 12        Next MD visit: none scheduled  Fall Risk: standard         Precautions: n/a             Subjective: no more knee pain. Able to do more lifting including lifting his son. Objective: See flowsheet below.  Rep w/ cerv flex x10 L/R  *Pain-free returned to sliders  reciprocally cont'd R Pt faciliatatedHip IR/Er w/ hold relax and aarom    sdly hip abd (clam shell x 10 L/R > manual resist x 10 ea L/R cont'd w/ PT manual resist Sidelying clamshells   2x10 L/R with mo

## 2017-07-24 ENCOUNTER — TELEPHONE (OUTPATIENT)
Dept: INTERNAL MEDICINE CLINIC | Facility: CLINIC | Age: 52
End: 2017-07-24

## 2017-07-24 ENCOUNTER — HOSPITAL ENCOUNTER (OUTPATIENT)
Dept: PHYSICAL THERAPY | Facility: HOSPITAL | Age: 52
Setting detail: THERAPIES SERIES
Discharge: HOME | End: 2017-07-24
Attending: NURSE PRACTITIONER
Payer: COMMERCIAL

## 2017-07-24 PROCEDURE — 97110 THERAPEUTIC EXERCISES: CPT

## 2017-07-24 NOTE — TELEPHONE ENCOUNTER
Patient's wife called to request a refill for Flexeril for her . Lillie Puente has been going to the pain clinic for treatment for his back.   I checked with our nurse Raúl Servin and she stated that the patient should request all medication regarding this problem

## 2017-07-24 NOTE — PROGRESS NOTES
Dx: LBP         Authorized # of Visits: 12        Next MD visit: none scheduled  Fall Risk: standard         Precautions: n/a            Discharge Summary    Pt has attended 8,  visits in Physical Therapy. Subjective: feels more flexible.  Able to get d 7/6/17  Tx#: 4/16 Date:    Tx#: 5/16 Date: 7/17/2017    Tx#: 6/ Date: 7/20/2017    Tx#: 7/8 Date: 7/24/2017    Tx#: 8/   nustep L-5 x 5 min L5 x 7 min Level 5 x 6mins cont'd cont'd L-6 l-7 x 5 min cont   SB covered w/ sheet: LTR, B knee to chest x 15 eq Con L/R  *pain-free standing HS stretch w/ 8\" step x 30 sec x 3 4\" ASU. ASD L w/ cues for hip rotation control 6\" ASD, LSU. X 10 ea L/R cont   Nose over toes  Sit<>stand instruction and practice Aerobic ex: pt reports current step count typically 1000.   HEP

## 2017-09-06 ENCOUNTER — TELEPHONE (OUTPATIENT)
Dept: INTERNAL MEDICINE CLINIC | Facility: CLINIC | Age: 52
End: 2017-09-06

## 2017-09-06 DIAGNOSIS — I10 ESSENTIAL HYPERTENSION: Primary | ICD-10-CM

## 2017-09-06 RX ORDER — METOPROLOL SUCCINATE 25 MG/1
25 TABLET, EXTENDED RELEASE ORAL DAILY
Qty: 30 TABLET | Refills: 2 | Status: SHIPPED | OUTPATIENT
Start: 2017-09-06 | End: 2017-11-02

## 2017-09-06 NOTE — TELEPHONE ENCOUNTER
Spoke with pt. Advised as below that medication switched to Metoprolol, pt to monitor blood pressure, pt to contact our office if BP is high or side effects. Pt  Voiced understanding.

## 2017-09-06 NOTE — TELEPHONE ENCOUNTER
Kesha called for an alternative medication to ATENOLOL 25 MG Oral Tab, which is on back order. Please advise.

## 2017-09-06 NOTE — TELEPHONE ENCOUNTER
Switch to metoprolol succinate 25mg once a day. If bp high or if lightheaded or dizzy pt should make an appt for evaluation. Please order and please inform pt about this change.

## 2017-09-06 NOTE — TELEPHONE ENCOUNTER
Noted below. Kesha asking for alternative to Atenolol since med is unavailable. Please advise alternative to Atenolol.

## 2017-09-18 ENCOUNTER — TELEPHONE (OUTPATIENT)
Dept: INTERNAL MEDICINE CLINIC | Facility: CLINIC | Age: 52
End: 2017-09-18

## 2017-09-18 NOTE — TELEPHONE ENCOUNTER
Patient called, he has been having some \"low level\" \"2 out of ten\" pain in his back, which started yesterday also, patient was walking up the stairs on Friday/Saturday, had a lot of muscle weakness for a short moment then it went away, ever since then

## 2017-09-18 NOTE — TELEPHONE ENCOUNTER
Spoke with pt, had epidural for lower back pain June 2nd, did \"great\" since then. Was his first injection, has no more scheduled since it was working so well.  Having breakthrough pain since this weekend, twinges about 2/10 on pain scale, legs gave out on

## 2017-09-18 NOTE — TELEPHONE ENCOUNTER
Noted below. He does need to get in touch with pain clinic and let them know about his symptoms starting to worsen. He can use flexeril at night to help with pain if he likes until he gets an answer from the pain clinic.

## 2017-11-02 ENCOUNTER — TELEPHONE (OUTPATIENT)
Dept: INTERNAL MEDICINE CLINIC | Facility: CLINIC | Age: 52
End: 2017-11-02

## 2017-11-02 RX ORDER — METOPROLOL SUCCINATE 25 MG/1
TABLET, EXTENDED RELEASE ORAL
Qty: 30 TABLET | Refills: 0 | Status: SHIPPED | OUTPATIENT
Start: 2017-11-02 | End: 2017-11-06

## 2017-11-06 RX ORDER — METOPROLOL SUCCINATE 25 MG/1
25 TABLET, EXTENDED RELEASE ORAL 2 TIMES DAILY
Qty: 60 TABLET | Refills: 0 | Status: SHIPPED | OUTPATIENT
Start: 2017-11-06 | End: 2017-12-04

## 2017-11-06 NOTE — TELEPHONE ENCOUNTER
Need clarification on dose pt should be on for atenolol/metoprolol. Pt states taking was taking atenolol 25mg BID, rx needed to be switched to metoprolol which was sent as 25 mg QD.     Last atenolol refill was sent for atenolol 25mg QD but qty of 180.  4/2

## 2017-11-06 NOTE — TELEPHONE ENCOUNTER
Mariah from [facility] called regarding below refill. The patient told the pharmacy that Dr. Carmen Hall changed the medication, same dose, same name but that the patient was supposed to be taking these medications not once a day, twice a day.    Mariah asked if we can c

## 2017-11-07 RX ORDER — METOPROLOL SUCCINATE 25 MG/1
TABLET, EXTENDED RELEASE ORAL
Qty: 180 TABLET | Refills: 0 | OUTPATIENT
Start: 2017-11-07

## 2017-11-13 ENCOUNTER — OFFICE VISIT (OUTPATIENT)
Dept: INTERNAL MEDICINE CLINIC | Facility: CLINIC | Age: 52
End: 2017-11-13

## 2017-11-13 VITALS
WEIGHT: 300.31 LBS | TEMPERATURE: 99 F | HEIGHT: 70 IN | DIASTOLIC BLOOD PRESSURE: 78 MMHG | BODY MASS INDEX: 42.99 KG/M2 | HEART RATE: 72 BPM | RESPIRATION RATE: 12 BRPM | SYSTOLIC BLOOD PRESSURE: 128 MMHG

## 2017-11-13 DIAGNOSIS — I10 ESSENTIAL HYPERTENSION: ICD-10-CM

## 2017-11-13 DIAGNOSIS — G43.909 MIGRAINE WITHOUT STATUS MIGRAINOSUS, NOT INTRACTABLE, UNSPECIFIED MIGRAINE TYPE: ICD-10-CM

## 2017-11-13 DIAGNOSIS — Z12.11 SCREENING FOR COLON CANCER: ICD-10-CM

## 2017-11-13 PROCEDURE — 99213 OFFICE O/P EST LOW 20 MIN: CPT | Performed by: INTERNAL MEDICINE

## 2017-11-13 RX ORDER — CYCLOBENZAPRINE HCL 10 MG
10 TABLET ORAL 3 TIMES DAILY PRN
COMMUNITY
End: 2017-12-18

## 2017-11-13 RX ORDER — LOPERAMIDE HYDROCHLORIDE 2 MG/1
2 CAPSULE ORAL AS NEEDED
COMMUNITY

## 2017-11-13 NOTE — PROGRESS NOTES
[facility]    CHIEF COMPLAINT:  Patient presents with:  Blood Pressure: pt states he is due for colonoscopy  Medication Follow-Up  Headache: has had a migraine x 2 weeks.    Imm/Inj: requesting flu shot        HISTORY OF PRESENT ILLNESS:  Here for CAPSULE BY MOUTH EVERY EVENING WITH DINNER SWALLOW WHOLE DO NOT CHEW,CRUSH,OR SPLIT Disp:  Rfl: 5   LYRICA 25 MG Oral Cap Take 25 mg by mouth.  Take 1 tablet every 3 hours while awake  Up to 5 tablets per day  Disp:  Rfl: 3   Sertraline HCl (ZOLOFT) 100 MG colonoscopy. - GASTRO - INTERNAL          Return to clinic in 3 months for cpx.     Arnel Mayen MD

## 2017-12-04 RX ORDER — METOPROLOL SUCCINATE 25 MG/1
TABLET, EXTENDED RELEASE ORAL
Qty: 60 TABLET | Refills: 2 | Status: SHIPPED | OUTPATIENT
Start: 2017-12-04 | End: 2018-03-15

## 2017-12-04 NOTE — TELEPHONE ENCOUNTER
Last OV relevant to medication: 11/13/17  Last refill date: 11/6/17   #60/refills:0  When pt was asked to return for OV: 3 months  Upcoming appt/reason: No appt scheduled. Didn't pass protocol due to date of last BMP, CMP. A BMP was ordered for 2/18.

## 2017-12-16 ENCOUNTER — TELEPHONE (OUTPATIENT)
Dept: INTERNAL MEDICINE CLINIC | Facility: CLINIC | Age: 52
End: 2017-12-16

## 2017-12-16 NOTE — TELEPHONE ENCOUNTER
Paged by pt. He is having low back pain spasms since yesterday. Not as bad as before but having a hard time moving around. Took flexeril with some relief but not enough.  Has naproxen at home and also has some norco.   He will try naproxen with the flexeril

## 2017-12-18 ENCOUNTER — OFFICE VISIT (OUTPATIENT)
Dept: SURGERY | Facility: CLINIC | Age: 52
End: 2017-12-18

## 2017-12-18 ENCOUNTER — TELEPHONE (OUTPATIENT)
Dept: SURGERY | Facility: CLINIC | Age: 52
End: 2017-12-18

## 2017-12-18 VITALS
HEART RATE: 81 BPM | WEIGHT: 300 LBS | HEIGHT: 70 IN | OXYGEN SATURATION: 96 % | RESPIRATION RATE: 18 BRPM | BODY MASS INDEX: 42.95 KG/M2

## 2017-12-18 DIAGNOSIS — M79.18 MYOFASCIAL MUSCLE PAIN: Primary | ICD-10-CM

## 2017-12-18 DIAGNOSIS — M62.830 MUSCLE SPASM OF BACK: ICD-10-CM

## 2017-12-18 PROCEDURE — 99214 OFFICE O/P EST MOD 30 MIN: CPT | Performed by: PHYSICIAN ASSISTANT

## 2017-12-18 RX ORDER — CYCLOBENZAPRINE HCL 10 MG
10 TABLET ORAL 4 TIMES DAILY PRN
Qty: 120 TABLET | Refills: 0 | Status: SHIPPED | OUTPATIENT
Start: 2017-12-18 | End: 2018-02-14

## 2017-12-18 RX ORDER — METHYLPREDNISOLONE 4 MG/1
TABLET ORAL
Qty: 1 PACKAGE | Refills: 0 | Status: SHIPPED | OUTPATIENT
Start: 2017-12-18 | End: 2018-03-20 | Stop reason: ALTCHOICE

## 2017-12-18 NOTE — PROGRESS NOTES
Name: Mamie Gowers Minor   : 10/20/1965   DOS: 2017     Pain Clinic Follow Up Visit:   Patient presents with: Follow - Up: low back pain      Mamie Gowers Minor is a 50year old male who presents for recheck of acute low back pain.  Pt is S/P 17 ARSH SAUL itchy skin  Strawberries                Comment:Rash      Current Outpatient Prescriptions:  Cyclobenzaprine HCl 10 MG Oral Tab Take 1 tablet (10 mg total) by mouth 4 (four) times daily as needed for Muscle spasms.  Disp: 120 tablet Rfl: 0   Diclofenac Sodi normal mood & affect, concentration & attention span intact. Inspection:  Ambulates with well-coordinated, fluid, non-antalgic gait.   Gait is normal.  Back: no tenderness to bilateral SIJ or bilateral LFJ , full ROM, twinges of pain with flexion, no pain

## 2017-12-18 NOTE — TELEPHONE ENCOUNTER
Pt requesting to see provider for 'next steps.'  Last seen in [address] 04/2017 and s/p injection 06/2017. Informed pt of need for OV for full assessment. Pt agreeable to plan. Call transferred to [facility] for scheduling. No further needs.

## 2017-12-18 NOTE — PATIENT INSTRUCTIONS
Refill policies:    • Allow 2-3 business days for refills; controlled substances may take longer.   • Contact your pharmacy at least 5 days prior to running out of medication and have them send an electronic request or submit request through the [facility] have a procedure or additional testing performed. [facility]) will contact your insurance carrier to obtain pre-certification or prior authorization.     Unfortunately, [facility] has seen an increase in denial of payment even though the p

## 2018-02-08 ENCOUNTER — TELEPHONE (OUTPATIENT)
Dept: SURGERY | Facility: CLINIC | Age: 53
End: 2018-02-08

## 2018-02-08 RX ORDER — METHYLPREDNISOLONE 4 MG/1
TABLET ORAL
Qty: 1 PACKAGE | Refills: 0 | Status: SHIPPED | OUTPATIENT
Start: 2018-02-08 | End: 2018-03-20 | Stop reason: ALTCHOICE

## 2018-02-08 NOTE — TELEPHONE ENCOUNTER
Pt calling today with increased pain since his last ov 12-18-17. Pt was prescribed Diclofenac and Cyclobenzaprine at that time which helped. The pain returned 4-5 days ago and started as a spasm.  Pt reports the pain goes up the left side of his back near h

## 2018-02-14 ENCOUNTER — OFFICE VISIT (OUTPATIENT)
Dept: SURGERY | Facility: CLINIC | Age: 53
End: 2018-02-14

## 2018-02-14 ENCOUNTER — TELEPHONE (OUTPATIENT)
Dept: SURGERY | Facility: CLINIC | Age: 53
End: 2018-02-14

## 2018-02-14 VITALS
HEART RATE: 78 BPM | SYSTOLIC BLOOD PRESSURE: 136 MMHG | HEIGHT: 70 IN | WEIGHT: 300 LBS | DIASTOLIC BLOOD PRESSURE: 76 MMHG | BODY MASS INDEX: 42.95 KG/M2

## 2018-02-14 DIAGNOSIS — M54.16 LUMBAR RADICULITIS: Primary | ICD-10-CM

## 2018-02-14 PROCEDURE — 99214 OFFICE O/P EST MOD 30 MIN: CPT | Performed by: NURSE PRACTITIONER

## 2018-02-14 RX ORDER — CYCLOBENZAPRINE HCL 10 MG
10 TABLET ORAL 3 TIMES DAILY PRN
Qty: 90 TABLET | Refills: 0 | Status: SHIPPED | OUTPATIENT
Start: 2018-02-14 | End: 2018-03-20

## 2018-02-14 NOTE — PATIENT INSTRUCTIONS
Refill policies:    • Allow 2-3 business days for refills; controlled substances may take longer.   • Contact your pharmacy at least 5 days prior to running out of medication and have them send an electronic request or submit request through the [facility] recommended that you have a procedure or additional testing performed. [facility]) will contact your insurance carrier to obtain pre-certification or prior authorization.     Unfortunately, [facility] has seen an increase in denial of paym (if applicable) to your appointment. Check in at [facility] ([address] [place] [address]. [address], [place], [place]) outpatient registration in the [employer]. • Please note-No prescriptions will be written by Pain Clinic in OR on the day of procedure.  If you requi the event you need to cancel or reschedule your appointment, you must notify the office 24 hours prior.     Post-procedure instructions:        Please schedule a follow up visit within 6 to 10 weeks after your last procedure date   Please call our office wi anesthetic, and a steroid. Will the epidural injection hurt? The epidural injection involves inserting a needle through skin and deeper tissues. There is some pain involved.  However the skin and deeper tissues are numbed with a local anesthetic before first epidural injection does not relieve your symptoms within one week, a second injection maybe recommended. Similarly, if the second epidural injection does not completely relieve your symptoms in 1 to 2 weeks a third injection maybe recommended.    Can

## 2018-02-14 NOTE — PROGRESS NOTES
Name: Caro Szymanski   : 10/20/1965   DOS: 2018     Pain Clinic Follow Up Visit:   Caro Szymanski is a 50year old male who presents for recheck of his chronic low back pain.     Status post:  17: Right transforaminal lumbar epidural steroid injecti tablet Rfl: 0   Ondansetron HCl (ZOFRAN) 4 mg tablet TK 1 T PO Q 8 H Disp:  Rfl: 5   Zolmitriptan 5 MG Oral Tablet Dispersible TK 1 T PO PRF HA Disp:  Rfl: 5   ClonazePAM 0.5 MG Oral Tab TAKE 1 TABLET BY MOUTH FIVE TIMES DAILY Disp: 150 tablet Rfl: 0   Met FINDINGS:  Alignment of the lumbar spine is normal.  Vertebral body heights are maintained throughout the lumbar spine. Disc spaces are maintained throughout the lumbar spine. Marrow signal is unremarkable. The conus is at T12/L1.    The visualized por time.    Medications filled today:  Signed Prescriptions Disp Refills    Diclofenac Sodium 50 MG Oral Tab EC 60 tablet 0      Sig: Take 1 tablet (50 mg total) by mouth 2 (two) times daily.       Cyclobenzaprine HCl 10 MG Oral Tab 90 tablet 0      Sig: Take

## 2018-02-15 NOTE — TELEPHONE ENCOUNTER
Patient's wife requesting later procedure time on 3/8. Procedure moved to 2:00pm, instructed to arrive at 1:00pm. Wife verbalized understanding, no further needs at this time.

## 2018-02-21 ENCOUNTER — TELEPHONE (OUTPATIENT)
Dept: NEUROLOGY | Facility: CLINIC | Age: 53
End: 2018-02-21

## 2018-02-21 NOTE — TELEPHONE ENCOUNTER
Spoke to Scott at [facility], codes [id number] are valid and billable, no prior authorization or predetermination required.  Call reference: [id number] call time 25:45

## 2018-02-28 ENCOUNTER — TELEPHONE (OUTPATIENT)
Dept: SURGERY | Facility: CLINIC | Age: 53
End: 2018-02-28

## 2018-02-28 NOTE — TELEPHONE ENCOUNTER
Spoke w/ pt and reviewed instructions, procedure date 3-1-18 and arrival time 8:15. Pt verbalized understanding and appreciation. No further needs.     [address]  PRE-PROCEDURE INSTRUCTIONS WITH IV SEDATION    Prior to the procedure:  Ple ? Greater than 81 mg (325mg) 7 days  ? Coumadin Procedure may be cancelled if INR is elevated. ? Epidural ____ - 7 days  ? Others 5 days  ? Excedrin (with aspirin) 7 days  ? Plavix (Clopidogrel)  ? Epidural ____ - 10 days  ?  Others 7 days   NSAIDs: 24 ho

## 2018-03-01 ENCOUNTER — TELEPHONE (OUTPATIENT)
Dept: SURGERY | Facility: CLINIC | Age: 53
End: 2018-03-01

## 2018-03-01 ENCOUNTER — SURGERY (OUTPATIENT)
Age: 53
End: 2018-03-01

## 2018-03-01 ENCOUNTER — HOSPITAL ENCOUNTER (OUTPATIENT)
Facility: HOSPITAL | Age: 53
Setting detail: HOSPITAL OUTPATIENT SURGERY
Discharge: HOME | End: 2018-03-01
Attending: ANESTHESIOLOGY | Admitting: ANESTHESIOLOGY
Payer: COMMERCIAL

## 2018-03-01 ENCOUNTER — APPOINTMENT (OUTPATIENT)
Dept: GENERAL RADIOLOGY | Facility: HOSPITAL | Age: 53
End: 2018-03-01
Attending: ANESTHESIOLOGY
Payer: COMMERCIAL

## 2018-03-01 VITALS
HEART RATE: 72 BPM | OXYGEN SATURATION: 92 % | SYSTOLIC BLOOD PRESSURE: 124 MMHG | RESPIRATION RATE: 19 BRPM | DIASTOLIC BLOOD PRESSURE: 92 MMHG | TEMPERATURE: 98 F

## 2018-03-01 DIAGNOSIS — M54.16 LUMBAR RADICULITIS: Primary | ICD-10-CM

## 2018-03-01 DIAGNOSIS — M54.16 LUMBAR RADICULITIS: ICD-10-CM

## 2018-03-01 PROCEDURE — 3E0R33Z INTRODUCTION OF ANTI-INFLAMMATORY INTO SPINAL CANAL, PERCUTANEOUS APPROACH: ICD-10-PCS | Performed by: ANESTHESIOLOGY

## 2018-03-01 PROCEDURE — 99152 MOD SED SAME PHYS/QHP 5/>YRS: CPT | Performed by: ANESTHESIOLOGY

## 2018-03-01 PROCEDURE — 3E0R3BZ INTRODUCTION OF ANESTHETIC AGENT INTO SPINAL CANAL, PERCUTANEOUS APPROACH: ICD-10-PCS | Performed by: ANESTHESIOLOGY

## 2018-03-01 RX ORDER — MIDAZOLAM HYDROCHLORIDE 1 MG/ML
INJECTION INTRAMUSCULAR; INTRAVENOUS AS NEEDED
Status: DISCONTINUED | OUTPATIENT
Start: 2018-03-01 | End: 2018-03-01 | Stop reason: HOSPADM

## 2018-03-01 RX ORDER — SODIUM CHLORIDE, SODIUM LACTATE, POTASSIUM CHLORIDE, CALCIUM CHLORIDE 600; 310; 30; 20 MG/100ML; MG/100ML; MG/100ML; MG/100ML
100 INJECTION, SOLUTION INTRAVENOUS CONTINUOUS
Status: DISCONTINUED | OUTPATIENT
Start: 2018-03-01 | End: 2018-03-01

## 2018-03-01 RX ORDER — ONDANSETRON 2 MG/ML
4 INJECTION INTRAMUSCULAR; INTRAVENOUS ONCE AS NEEDED
Status: CANCELLED | OUTPATIENT
Start: 2018-03-01 | End: 2018-03-01

## 2018-03-01 RX ORDER — DIPHENHYDRAMINE HYDROCHLORIDE 50 MG/ML
50 INJECTION INTRAMUSCULAR; INTRAVENOUS ONCE AS NEEDED
Status: CANCELLED | OUTPATIENT
Start: 2018-03-01 | End: 2018-03-01

## 2018-03-01 RX ORDER — DEXAMETHASONE SODIUM PHOSPHATE 10 MG/ML
INJECTION, SOLUTION INTRAMUSCULAR; INTRAVENOUS AS NEEDED
Status: DISCONTINUED | OUTPATIENT
Start: 2018-03-01 | End: 2018-03-01 | Stop reason: HOSPADM

## 2018-03-01 RX ORDER — LIDOCAINE HYDROCHLORIDE 10 MG/ML
INJECTION, SOLUTION EPIDURAL; INFILTRATION; INTRACAUDAL; PERINEURAL AS NEEDED
Status: DISCONTINUED | OUTPATIENT
Start: 2018-03-01 | End: 2018-03-01 | Stop reason: HOSPADM

## 2018-03-01 NOTE — OR NURSING
Patient had some numbness to right leg post-procedure, has resolved. Able to ambulate in room without difficulty. Discharge instructions discussed with patient and wife, verbalized understanding.

## 2018-03-01 NOTE — OPERATIVE REPORT
[facility]  Operative Report  3/1/2018     Lin Villar Minor Patient Status:  Hospital Outpatient Surgery    10/20/1965 MRN [id number]   Location [facility] Attending Aloysius Klinefelter, MD   Hosp Day # 0 PCP Bethany Newman MD     Indication: Ry In the prone position, following sterile prep and drape of the lumbar region, right L2-L3 neural foramen was identified under fluoroscopy.   The skin and subcutaneous tissue was anesthetized via 25-gauge 1.5 inch needle with approximately 2 mL of 1% lid

## 2018-03-01 NOTE — TELEPHONE ENCOUNTER
Patient seen in the procedure area today.  Patient had 96% pain relief after his last right TLESI in 06/2017 and now he is c/o pain going down bilateral legs, discussed with Dr. Jeovany Cummins and I recommend the patient get a new MRI since he had good pain relief fo

## 2018-03-01 NOTE — H&P
History & Physical Examination    Patient Name: Maria Del Carmen Gillespie  MRN: [id number]  CSN: [id number]  YOB: 1965    Pre-Operative Diagnosis:  Lumbar radiculitis [M54.16]    Present Illness: A 50year old male with low back pain is here for bilateral Sertraline HCl (ZOLOFT) 100 MG Oral Tab TAKE 1 TABLET BY MOUTH EVERY MORNING. Disp: 30 tablet Rfl: 0 Taking   DiphenhydrAMINE HCl (BENADRYL) 25 MG Oral Tab Take 25 mg by mouth every 6 (six) hours as needed.  Disp:  Rfl:  Taking       No current facility-a Cancer Father 64     bladder cancer   • Alcohol and Other Disorders Associated Father    • congential blindness[other] [OTHER] Mother    • possible MS[other] [OTHER] Mother    • cva[other] [OTHER] Maternal Grandfather      smoking related   • congential bl

## 2018-03-02 ENCOUNTER — TELEPHONE (OUTPATIENT)
Dept: SURGERY | Facility: CLINIC | Age: 53
End: 2018-03-02

## 2018-03-02 NOTE — TELEPHONE ENCOUNTER
LMTCB to review the following preoperative instructions. Reminded to hold diclofenac and Nsaids.        [address]  PRE-PROCEDURE INSTRUCTIONS WITH IV SEDATION        Appointment Date: 3/8    Check-In Time: 1 PM     ** TO AVOID CANCELLATION Number of days you need to be off for the following medications:  ? Aggrenox 10 days   ? Agrylin (Anagrelide) 10 days   ? Enbrel (Etanercept) 24 hours   ? Fragmin (Dalteparin) 24 hours   ? Pletal (Cilostazol) 7 days  ? Effient (Prasugrel) 7 days  ?  Pradax If you are a diabetic, please increase the frequency of your glucose monitoring after the procedure as this may cause a temporary increase in your blood sugar.   Contact your primary care physician if your blood sugar rises as you may require some medicatio

## 2018-03-05 ENCOUNTER — TELEPHONE (OUTPATIENT)
Dept: SURGERY | Facility: CLINIC | Age: 53
End: 2018-03-05

## 2018-03-05 NOTE — TELEPHONE ENCOUNTER
Returned Danya from MRI call. Per chart review patient has had surgery on back, would like to know if contrast was needed because order did not state no contrast. Per [address], no contrast needed. Informed Danya, no further needs.

## 2018-03-07 ENCOUNTER — HOSPITAL ENCOUNTER (OUTPATIENT)
Dept: MRI IMAGING | Age: 53
Discharge: HOME | End: 2018-03-07
Attending: NURSE PRACTITIONER
Payer: COMMERCIAL

## 2018-03-07 DIAGNOSIS — M54.16 LUMBAR RADICULITIS: ICD-10-CM

## 2018-03-07 PROCEDURE — 72148 MRI LUMBAR SPINE W/O DYE: CPT | Performed by: NURSE PRACTITIONER

## 2018-03-08 ENCOUNTER — HOSPITAL ENCOUNTER (OUTPATIENT)
Facility: HOSPITAL | Age: 53
Setting detail: HOSPITAL OUTPATIENT SURGERY
Discharge: HOME | End: 2018-03-08
Attending: ANESTHESIOLOGY | Admitting: ANESTHESIOLOGY
Payer: COMMERCIAL

## 2018-03-08 ENCOUNTER — TELEPHONE (OUTPATIENT)
Dept: SURGERY | Facility: CLINIC | Age: 53
End: 2018-03-08

## 2018-03-08 ENCOUNTER — APPOINTMENT (OUTPATIENT)
Dept: GENERAL RADIOLOGY | Facility: HOSPITAL | Age: 53
End: 2018-03-08
Attending: ANESTHESIOLOGY
Payer: COMMERCIAL

## 2018-03-08 ENCOUNTER — SURGERY (OUTPATIENT)
Age: 53
End: 2018-03-08

## 2018-03-08 VITALS
RESPIRATION RATE: 18 BRPM | HEART RATE: 68 BPM | DIASTOLIC BLOOD PRESSURE: 97 MMHG | OXYGEN SATURATION: 97 % | TEMPERATURE: 98 F | SYSTOLIC BLOOD PRESSURE: 128 MMHG

## 2018-03-08 DIAGNOSIS — M54.16 LUMBAR RADICULITIS: ICD-10-CM

## 2018-03-08 PROCEDURE — 3E0R3BZ INTRODUCTION OF ANESTHETIC AGENT INTO SPINAL CANAL, PERCUTANEOUS APPROACH: ICD-10-PCS | Performed by: ANESTHESIOLOGY

## 2018-03-08 PROCEDURE — 99152 MOD SED SAME PHYS/QHP 5/>YRS: CPT | Performed by: ANESTHESIOLOGY

## 2018-03-08 PROCEDURE — 3E0R33Z INTRODUCTION OF ANTI-INFLAMMATORY INTO SPINAL CANAL, PERCUTANEOUS APPROACH: ICD-10-PCS | Performed by: ANESTHESIOLOGY

## 2018-03-08 RX ORDER — LIDOCAINE HYDROCHLORIDE 10 MG/ML
INJECTION, SOLUTION EPIDURAL; INFILTRATION; INTRACAUDAL; PERINEURAL AS NEEDED
Status: DISCONTINUED | OUTPATIENT
Start: 2018-03-08 | End: 2018-03-08 | Stop reason: HOSPADM

## 2018-03-08 RX ORDER — DEXAMETHASONE SODIUM PHOSPHATE 10 MG/ML
INJECTION, SOLUTION INTRAMUSCULAR; INTRAVENOUS AS NEEDED
Status: DISCONTINUED | OUTPATIENT
Start: 2018-03-08 | End: 2018-03-08 | Stop reason: HOSPADM

## 2018-03-08 RX ORDER — SODIUM CHLORIDE, SODIUM LACTATE, POTASSIUM CHLORIDE, CALCIUM CHLORIDE 600; 310; 30; 20 MG/100ML; MG/100ML; MG/100ML; MG/100ML
100 INJECTION, SOLUTION INTRAVENOUS CONTINUOUS
Status: DISCONTINUED | OUTPATIENT
Start: 2018-03-08 | End: 2018-03-08

## 2018-03-08 RX ORDER — ONDANSETRON 2 MG/ML
4 INJECTION INTRAMUSCULAR; INTRAVENOUS ONCE AS NEEDED
Status: CANCELLED | OUTPATIENT
Start: 2018-03-08 | End: 2018-03-08

## 2018-03-08 RX ORDER — MIDAZOLAM HYDROCHLORIDE 1 MG/ML
INJECTION INTRAMUSCULAR; INTRAVENOUS AS NEEDED
Status: DISCONTINUED | OUTPATIENT
Start: 2018-03-08 | End: 2018-03-08 | Stop reason: HOSPADM

## 2018-03-08 RX ORDER — DIPHENHYDRAMINE HYDROCHLORIDE 50 MG/ML
50 INJECTION INTRAMUSCULAR; INTRAVENOUS ONCE AS NEEDED
Status: CANCELLED | OUTPATIENT
Start: 2018-03-08 | End: 2018-03-08

## 2018-03-08 NOTE — TELEPHONE ENCOUNTER
Please add to snapshot    Pt. Became hypotensive during procedure, pt reports hx of vasovagal episodes,  Also hx of hypertension and migraines which added to complexity of injection,  Per dr Bonner Ra please update snapshot.

## 2018-03-08 NOTE — H&P
History & Physical Examination    Patient Name: Thad Ramirez  MRN: [id number]  CSN: [id number]  YOB: 1965    Pre-Operative Diagnosis:  Lumbar radiculitis [M54.16]    Present Illness: A 50year old male with low back pain is here for bilateral 25 mg by mouth. Take 1 tablet every 3 hours while awake  Up to 5 tablets per day  Disp:  Rfl: 3 Taking   Sertraline HCl (ZOLOFT) 100 MG Oral Tab TAKE 1 TABLET BY MOUTH EVERY MORNING.  Disp: 30 tablet Rfl: 0 Taking   DiphenhydrAMINE HCl (BENADRYL) 25 MG Oral Comment: pilonidal cystectomy  1993: TONSILLECTOMY  5/27/2009: UPPER GI ENDOSCOPY,BIOPSY  Family History   Problem Relation Age of Onset   • Cancer Father 64     bladder cancer   • Alcohol and Other Disorders Associated Father    • congential blindness[oth

## 2018-03-08 NOTE — OR NURSING
Pt became hypotensive during injection,  Ice pack applied, trendleberg, fluids,  Dr. Onel Garrett at bedside.   Pt alert and oriented, vitals stablized, returned to post op area, hand off to RN

## 2018-03-08 NOTE — OPERATIVE REPORT
[facility]  Operative Report  3/8/2018     Beckie Bernardo Minor Patient Status:  Hospital Outpatient Surgery    10/20/1965 MRN [id number]   Location [address] [place] [address] Attending Christiane Rouse MD   Hosp Day # 0 PCP Lon Escamilla lidocaine. A 22-gauge 5 inch Quincke spinal needle was introduced toward the inferior aspect of the junction between the transverse process and pedicle of the right L4-L5 level atraumatically under fluoroscopic guidance.   The needle was advanced into the

## 2018-03-14 ENCOUNTER — TELEPHONE (OUTPATIENT)
Dept: SURGERY | Facility: CLINIC | Age: 53
End: 2018-03-14

## 2018-03-14 NOTE — TELEPHONE ENCOUNTER
Reviewed instructions below with patient, confirmed hold of diclofenac and no recent infections. No further needs at this time.     [address]  PRE-PROCEDURE INSTRUCTIONS WITH IV SEDATION        Appointment Date: 3/15/18   Check-In Time: 8 Medication:   Number of days you need to be off for the following medications:  ? Aggrenox 10 days   ? Agrylin (Anagrelide) 10 days   ? Enbrel (Etanercept) 24 hours   ? Fragmin (Dalteparin) 24 hours   ? Pletal (Cilostazol) 7 days  ?  Effient (Prasugrel) 7 d If you are a diabetic, please increase the frequency of your glucose monitoring after the procedure as this may cause a temporary increase in your blood sugar.   Contact your primary care physician if your blood sugar rises as you may require some medicatio

## 2018-03-15 ENCOUNTER — HOSPITAL ENCOUNTER (OUTPATIENT)
Facility: HOSPITAL | Age: 53
Setting detail: HOSPITAL OUTPATIENT SURGERY
Discharge: HOME | End: 2018-03-15
Attending: ANESTHESIOLOGY | Admitting: ANESTHESIOLOGY
Payer: COMMERCIAL

## 2018-03-15 ENCOUNTER — TELEPHONE (OUTPATIENT)
Dept: SURGERY | Facility: CLINIC | Age: 53
End: 2018-03-15

## 2018-03-15 ENCOUNTER — TELEPHONE (OUTPATIENT)
Dept: INTERNAL MEDICINE CLINIC | Facility: CLINIC | Age: 53
End: 2018-03-15

## 2018-03-15 ENCOUNTER — APPOINTMENT (OUTPATIENT)
Dept: GENERAL RADIOLOGY | Facility: HOSPITAL | Age: 53
End: 2018-03-15
Attending: ANESTHESIOLOGY
Payer: COMMERCIAL

## 2018-03-15 ENCOUNTER — SURGERY (OUTPATIENT)
Age: 53
End: 2018-03-15

## 2018-03-15 VITALS
OXYGEN SATURATION: 93 % | DIASTOLIC BLOOD PRESSURE: 98 MMHG | TEMPERATURE: 98 F | RESPIRATION RATE: 18 BRPM | SYSTOLIC BLOOD PRESSURE: 139 MMHG | HEART RATE: 70 BPM

## 2018-03-15 DIAGNOSIS — M54.16 LUMBAR RADICULITIS: ICD-10-CM

## 2018-03-15 RX ORDER — ATENOLOL 25 MG/1
25 TABLET ORAL 2 TIMES DAILY
Qty: 90 TABLET | Refills: 0 | Status: SHIPPED | OUTPATIENT
Start: 2018-03-15 | End: 2018-03-16

## 2018-03-15 RX ORDER — MIDAZOLAM HYDROCHLORIDE 1 MG/ML
INJECTION INTRAMUSCULAR; INTRAVENOUS AS NEEDED
Status: DISCONTINUED | OUTPATIENT
Start: 2018-03-15 | End: 2018-03-15 | Stop reason: HOSPADM

## 2018-03-15 RX ORDER — SODIUM CHLORIDE, SODIUM LACTATE, POTASSIUM CHLORIDE, CALCIUM CHLORIDE 600; 310; 30; 20 MG/100ML; MG/100ML; MG/100ML; MG/100ML
100 INJECTION, SOLUTION INTRAVENOUS CONTINUOUS
Status: DISCONTINUED | OUTPATIENT
Start: 2018-03-15 | End: 2018-03-15

## 2018-03-15 RX ORDER — ONDANSETRON 2 MG/ML
4 INJECTION INTRAMUSCULAR; INTRAVENOUS ONCE AS NEEDED
Status: CANCELLED | OUTPATIENT
Start: 2018-03-15 | End: 2018-03-15

## 2018-03-15 RX ORDER — DIPHENHYDRAMINE HYDROCHLORIDE 50 MG/ML
50 INJECTION INTRAMUSCULAR; INTRAVENOUS ONCE AS NEEDED
Status: CANCELLED | OUTPATIENT
Start: 2018-03-15 | End: 2018-03-15

## 2018-03-15 NOTE — TELEPHONE ENCOUNTER
Please correct prescription. Looks like it was done for #90. This is a bid medication. Needs to be #180.

## 2018-03-15 NOTE — TELEPHONE ENCOUNTER
Patient stated that he has been taking Metoprolol 25 mg bid and it's not controlling his BP. He stated that he was on Atenolol prior to changing to Metoprolol and the Atenolol worked very well for him. He would like to switch back to Atenolol.   He went f

## 2018-03-15 NOTE — TELEPHONE ENCOUNTER
The patient called back. He stated he gets a weekly epidural for a bulging disc.  He was able to get two treatments so far, however, today when he arrived for his third treatment the physician would not administer the epidural stating they did not feel comf

## 2018-03-15 NOTE — TELEPHONE ENCOUNTER
Patient brought back to the procedure area where his BP was in the 130-140/100-103 and last week during his procedure his BP was in the same range and he had a vasovagal episode after the procedure was done, so Dr. Lolis Gandhi cancelled the procedure today.  Vanita

## 2018-03-15 NOTE — TELEPHONE ENCOUNTER
Called patient and informed him that per Dr. Robert Blackman, he can stop the Metoprolol and go back on his Atenolol 25 mg BID. Okay to give 90 day supply per Dr. Robert Blackman.  Sent over to [address] [facility] in [facility] (on file) #90 with 0 refills per patient's request. Patient

## 2018-03-15 NOTE — H&P
History & Physical Examination    Patient Name: Ian York  MRN: [id number]  CSN: [id number]  YOB: 1965    Pre-Operative Diagnosis:  Lumbar radiculitis [M54.16]    Present Illness: A 50year old male with low back pain is here for bilateral mouth. Take 1 tablet every 3 hours while awake  Up to 5 tablets per day  Disp:  Rfl: 3 3/14/2018   Sertraline HCl (ZOLOFT) 100 MG Oral Tab TAKE 1 TABLET BY MOUTH EVERY MORNING.  Disp: 30 tablet Rfl: 0 3/14/2018   DiphenhydrAMINE HCl (BENADRYL) 25 MG Oral Ta Seasonal allergies    • Sigmoid polyp 5/27/2009    4 mm.    • Skull fracture ([facility] [address]    • Sleep difficulties 1/19/2007   • Syncope    • Unspecified essential hypertension    • Vasovagal syncope      Past Surgical History:  5/2005: OTHER SURGICAL HISTORY

## 2018-03-16 ENCOUNTER — TELEPHONE (OUTPATIENT)
Dept: NEUROLOGY | Facility: CLINIC | Age: 53
End: 2018-03-16

## 2018-03-16 RX ORDER — ATENOLOL 25 MG/1
25 TABLET ORAL 2 TIMES DAILY
Qty: 180 TABLET | Refills: 0 | Status: SHIPPED | OUTPATIENT
Start: 2018-03-16 | End: 2018-07-13

## 2018-03-16 NOTE — TELEPHONE ENCOUNTER
Can start back on Diclofenac? If so how many should I take  Is it safe for me to also take cyclobenzaprine?

## 2018-03-20 ENCOUNTER — OFFICE VISIT (OUTPATIENT)
Dept: INTERNAL MEDICINE CLINIC | Facility: CLINIC | Age: 53
End: 2018-03-20

## 2018-03-20 VITALS
SYSTOLIC BLOOD PRESSURE: 120 MMHG | TEMPERATURE: 98 F | HEIGHT: 70 IN | WEIGHT: 296.38 LBS | RESPIRATION RATE: 16 BRPM | DIASTOLIC BLOOD PRESSURE: 78 MMHG | HEART RATE: 64 BPM | BODY MASS INDEX: 42.43 KG/M2

## 2018-03-20 DIAGNOSIS — Z00.00 PHYSICAL EXAM, ANNUAL: Primary | ICD-10-CM

## 2018-03-20 DIAGNOSIS — Z12.5 SCREENING FOR PROSTATE CANCER: ICD-10-CM

## 2018-03-20 DIAGNOSIS — I10 ESSENTIAL HYPERTENSION: ICD-10-CM

## 2018-03-20 DIAGNOSIS — Z23 NEED FOR VACCINATION: ICD-10-CM

## 2018-03-20 PROCEDURE — 90686 IIV4 VACC NO PRSV 0.5 ML IM: CPT | Performed by: INTERNAL MEDICINE

## 2018-03-20 PROCEDURE — 99396 PREV VISIT EST AGE 40-64: CPT | Performed by: INTERNAL MEDICINE

## 2018-03-20 PROCEDURE — 90471 IMMUNIZATION ADMIN: CPT | Performed by: INTERNAL MEDICINE

## 2018-03-20 RX ORDER — METOCLOPRAMIDE 5 MG/1
5 TABLET ORAL
COMMUNITY

## 2018-03-20 RX ORDER — CYCLOBENZAPRINE HCL 10 MG
10 TABLET ORAL 3 TIMES DAILY PRN
Qty: 90 TABLET | Refills: 0 | Status: SHIPPED | OUTPATIENT
Start: 2018-03-20 | End: 2018-04-19

## 2018-03-20 NOTE — PROGRESS NOTES
[facility]    CHIEF COMPLAINT: Patient presents with:  Routine Physical: Due for Colonoscopy         HPI:   Mamie Gowers Minor is a 50year old male who presents for a complete physical exam.      htn: much better on atenolol 25mg bid.  No chest pain, n EC Take 1 tablet (50 mg total) by mouth 2 (two) times daily. Disp: 60 tablet Rfl: 0   HYDROcodone-acetaminophen  MG Oral Tab Take 1-2 tablets by mouth 2 (two) times daily as needed for Pain.  Disp: 28 tablet Rfl: 0   Zolmitriptan 5 MG Oral Tablet Disp Maternal Grandmother    • alzheimer's dementia[other] [OTHER] Maternal Grandfather    • lung cancer[other] [OTHER] Maternal Aunt    • Heart Attack Maternal Uncle      smoker   • Heart Disease Neg       Social History:   Smoking status: Never Smoker sensory are grossly intact    Labs:     Lab Results  Component Value Date/Time   WBC 7.3 01/25/2016 01:00 PM   HGB 15.1 01/25/2016 01:00 PM   .0 01/25/2016 01:00 PM        Lab Results  Component Value Date/Time   GLU 89 11/18/2016 01:53 PM

## 2018-03-20 NOTE — TELEPHONE ENCOUNTER
Please let him know he can restart diclofenac 50 mg twice daily if needed for pain. He can also take cyclobenzaprine 10mg every 8 hours if needed for muscle spasm. I have sent scripts to his pharmacy. Thanks!

## 2018-03-21 NOTE — TELEPHONE ENCOUNTER
Spoke with patient and informed patient of message below. Pt verbalized understanding and states he picked up the Rx yesterday. No further questions at this time.

## 2018-05-08 RX ORDER — METOPROLOL SUCCINATE 25 MG/1
TABLET, EXTENDED RELEASE ORAL
Qty: 60 TABLET | Refills: 1 | OUTPATIENT
Start: 2018-05-08

## 2018-07-09 ENCOUNTER — TELEPHONE (OUTPATIENT)
Dept: SURGERY | Facility: CLINIC | Age: 53
End: 2018-07-09

## 2018-07-09 ENCOUNTER — OFFICE VISIT (OUTPATIENT)
Dept: SURGERY | Facility: CLINIC | Age: 53
End: 2018-07-09

## 2018-07-09 VITALS
SYSTOLIC BLOOD PRESSURE: 122 MMHG | BODY MASS INDEX: 42.52 KG/M2 | HEART RATE: 60 BPM | HEIGHT: 70 IN | WEIGHT: 297 LBS | DIASTOLIC BLOOD PRESSURE: 84 MMHG | OXYGEN SATURATION: 98 %

## 2018-07-09 DIAGNOSIS — M54.16 LUMBAR RADICULOPATHY: Primary | ICD-10-CM

## 2018-07-09 PROCEDURE — 99214 OFFICE O/P EST MOD 30 MIN: CPT | Performed by: ANESTHESIOLOGY

## 2018-07-09 RX ORDER — CYCLOBENZAPRINE HCL 10 MG
10 TABLET ORAL
COMMUNITY
Start: 2018-05-07 | End: 2020-04-17 | Stop reason: ALTCHOICE

## 2018-07-09 NOTE — PATIENT INSTRUCTIONS
Refill policies:    • Allow 2-3 business days for refills; controlled substances may take longer.   • Contact your pharmacy at least 5 days prior to running out of medication and have them send an electronic request or submit request through the “request re entire amount billed. Precertification and Prior Authorizations: If your physician has recommended that you have a procedure or additional testing performed.   [facility]) will contact your insurance carrier to obtain pre-certi procedure if you are experiencing any symptoms of infection such as cough, fever, chills, urinary symptoms, or have recently been prescribed antibiotics, have open wounds, have recently had surgery or dental procedures.      As you will be unable to shower days  • Eliquis (Apixaban) 3 days  • Xarelto (Rivaroxaban) 3 days  • Lovenox (Enoxaparin) 24 hours  • Aspirin  • 81mg 24 hours  • Greater than 81 mg (325mg) 7 days  • Coumadin       5 days  • Procedure may be cancelled if INR is elevated.    • Excedrin (wit CANCELLATION AND/OR RESCHEDULING: PLEASE CALL [facility] PRE-PROCEDURE LINE -[phone] FOR DETAILED INSTRUCTIONS FIVE TO SEVEN DAYS PRIOR TO PROCEDURE**

## 2018-07-09 NOTE — TELEPHONE ENCOUNTER
Spoke with patient and states he is experiencing pain down right leg, and is experiencing sharp pain when lifting leg. Pt. Pains stems from right buttock and wraps around to right thigh and down the knee. Pt. Reports a pain level of 8 on 0-10 scale.  Pt. Re

## 2018-07-09 NOTE — TELEPHONE ENCOUNTER
Over the weekend he had very severe pain in upper right thigh, particularly when he tried to move his bottom leg to either left or right and also when he was lying down. Very sharp pain on lying down.  Spent all day in bed which helped but still having some

## 2018-07-11 NOTE — PROGRESS NOTES
Name: Lin Szymanski   : 10/20/1965   DOS: 2018     Pain Clinic Follow Up Visit:   Lin Szymanski is a 50year old male who presents for recheck of his chronic low back pain.   He is status post right transforaminal lumbar epidural steroid injection 1 y and 1 each afternoon. ) Disp: 150 tablet Rfl: 0   LYRICA 25 MG Oral Cap Take 25 mg by mouth. Take 1 tablet every 3 hours while awake  Up to 5 tablets per day  Disp:  Rfl: 3   Sertraline HCl (ZOLOFT) 100 MG Oral Tab TAKE 1 TABLET BY MOUTH EVERY MORNING.  Dis

## 2018-07-12 ENCOUNTER — TELEPHONE (OUTPATIENT)
Dept: SURGERY | Facility: CLINIC | Age: 53
End: 2018-07-12

## 2018-07-12 DIAGNOSIS — M54.16 LUMBAR RADICULITIS: ICD-10-CM

## 2018-07-12 DIAGNOSIS — M54.16 LUMBAR RADICULOPATHY: Primary | ICD-10-CM

## 2018-07-13 NOTE — TELEPHONE ENCOUNTER
Spoke with pt, advised importance of getting labs and asked that he do that soon, pt agreeable.     Last OV relevant to medication: 3/20/18  Last refill date: 3/16/18     #/refills: 180+0 (90ds)  When pt was asked to return for OV: 6 months  Upcoming appt/r

## 2018-07-15 RX ORDER — ATENOLOL 25 MG/1
25 TABLET ORAL 2 TIMES DAILY
Qty: 180 TABLET | Refills: 0 | Status: SHIPPED | OUTPATIENT
Start: 2018-07-15 | End: 2018-10-15

## 2018-07-16 ENCOUNTER — TELEPHONE (OUTPATIENT)
Dept: SURGERY | Facility: CLINIC | Age: 53
End: 2018-07-16

## 2018-07-26 ENCOUNTER — TELEPHONE (OUTPATIENT)
Dept: SURGERY | Facility: CLINIC | Age: 53
End: 2018-07-26

## 2018-07-26 NOTE — TELEPHONE ENCOUNTER
Spoke to patient, confirmed procedure date of 7/31/18 and to be checked in at outpatient registration at 9:00 am. Patient called pre-procedure line and understood instructions. Patient instructed to call office if there are additional questions .

## 2018-07-31 ENCOUNTER — HOSPITAL ENCOUNTER (OUTPATIENT)
Facility: HOSPITAL | Age: 53
Setting detail: HOSPITAL OUTPATIENT SURGERY
Discharge: HOME | End: 2018-07-31
Attending: ANESTHESIOLOGY | Admitting: ANESTHESIOLOGY
Payer: COMMERCIAL

## 2018-07-31 ENCOUNTER — APPOINTMENT (OUTPATIENT)
Dept: GENERAL RADIOLOGY | Facility: HOSPITAL | Age: 53
End: 2018-07-31
Attending: ANESTHESIOLOGY
Payer: COMMERCIAL

## 2018-07-31 ENCOUNTER — SURGERY (OUTPATIENT)
Age: 53
End: 2018-07-31

## 2018-07-31 VITALS
RESPIRATION RATE: 18 BRPM | HEART RATE: 61 BPM | TEMPERATURE: 98 F | OXYGEN SATURATION: 91 % | SYSTOLIC BLOOD PRESSURE: 141 MMHG | DIASTOLIC BLOOD PRESSURE: 98 MMHG

## 2018-07-31 DIAGNOSIS — M54.16 LUMBAR RADICULOPATHY: ICD-10-CM

## 2018-07-31 DIAGNOSIS — M54.16 LUMBAR RADICULITIS: ICD-10-CM

## 2018-07-31 PROCEDURE — 3E0R33Z INTRODUCTION OF ANTI-INFLAMMATORY INTO SPINAL CANAL, PERCUTANEOUS APPROACH: ICD-10-PCS | Performed by: ANESTHESIOLOGY

## 2018-07-31 PROCEDURE — 99152 MOD SED SAME PHYS/QHP 5/>YRS: CPT | Performed by: ANESTHESIOLOGY

## 2018-07-31 PROCEDURE — 3E0R3BZ INTRODUCTION OF ANESTHETIC AGENT INTO SPINAL CANAL, PERCUTANEOUS APPROACH: ICD-10-PCS | Performed by: ANESTHESIOLOGY

## 2018-07-31 RX ORDER — SODIUM CHLORIDE, SODIUM LACTATE, POTASSIUM CHLORIDE, CALCIUM CHLORIDE 600; 310; 30; 20 MG/100ML; MG/100ML; MG/100ML; MG/100ML
100 INJECTION, SOLUTION INTRAVENOUS CONTINUOUS
Status: DISCONTINUED | OUTPATIENT
Start: 2018-07-31 | End: 2018-07-31

## 2018-07-31 RX ORDER — DEXAMETHASONE SODIUM PHOSPHATE 10 MG/ML
INJECTION, SOLUTION INTRAMUSCULAR; INTRAVENOUS AS NEEDED
Status: DISCONTINUED | OUTPATIENT
Start: 2018-07-31 | End: 2018-07-31 | Stop reason: HOSPADM

## 2018-07-31 RX ORDER — ONDANSETRON 2 MG/ML
4 INJECTION INTRAMUSCULAR; INTRAVENOUS ONCE AS NEEDED
Status: DISCONTINUED | OUTPATIENT
Start: 2018-07-31 | End: 2018-07-31

## 2018-07-31 RX ORDER — MIDAZOLAM HYDROCHLORIDE 1 MG/ML
INJECTION INTRAMUSCULAR; INTRAVENOUS AS NEEDED
Status: DISCONTINUED | OUTPATIENT
Start: 2018-07-31 | End: 2018-07-31 | Stop reason: HOSPADM

## 2018-07-31 RX ORDER — LIDOCAINE HYDROCHLORIDE 10 MG/ML
INJECTION, SOLUTION EPIDURAL; INFILTRATION; INTRACAUDAL; PERINEURAL AS NEEDED
Status: DISCONTINUED | OUTPATIENT
Start: 2018-07-31 | End: 2018-07-31 | Stop reason: HOSPADM

## 2018-07-31 RX ORDER — DIPHENHYDRAMINE HYDROCHLORIDE 50 MG/ML
50 INJECTION INTRAMUSCULAR; INTRAVENOUS ONCE AS NEEDED
Status: DISCONTINUED | OUTPATIENT
Start: 2018-07-31 | End: 2018-07-31

## 2018-07-31 NOTE — OPERATIVE REPORT
[facility]  Operative Report  2018     Nisreen Maya Minor Patient Status:  Hospital Outpatient Surgery    10/20/1965 MRN [id number]   [facility] att[facility] providers found   [address] Day # 0 PCP Hyun Patterson MD fluoroscopy. The skin and subcutaneous tissue was anesthetized via 25-gauge 1.5 inch needle with approximately 2 mL of 1% lidocaine.   A 22-gauge 5 inch Quincke spinal needle was introduced toward the inferior aspect of the junction between the transverse

## 2018-07-31 NOTE — H&P
History & Physical Examination    Patient Name: Ciara Olivares  MRN: [id number]  CSN: [id number]  YOB: 1965    Pre-Operative Diagnosis:  Lumbar radiculopathy [M54.16]  Lumbar radiculitis [M54.16]    Present Illness: 56-year-old male patient wit 3 mm.   • Seasonal allergies    • Sigmoid polyp 5/27/2009    4 mm.    • Skull fracture ([facility] [address]    • Sleep difficulties 1/19/2007   • Syncope    • Unspecified essential hypertension    • Vasovagal syncope      Past Surgical History:  5/2005: OTHER SURGICAL

## 2018-08-08 ENCOUNTER — LAB ENCOUNTER (OUTPATIENT)
Dept: LAB | Age: 53
End: 2018-08-08
Attending: INTERNAL MEDICINE
Payer: COMMERCIAL

## 2018-08-08 DIAGNOSIS — Z12.5 SCREENING FOR PROSTATE CANCER: ICD-10-CM

## 2018-08-08 DIAGNOSIS — Z00.00 PHYSICAL EXAM, ANNUAL: ICD-10-CM

## 2018-08-08 LAB
ALBUMIN SERPL-MCNC: 3.8 G/DL (ref 3.5–4.8)
ALBUMIN/GLOB SERPL: 0.9 {RATIO} (ref 1–2)
ALP LIVER SERPL-CCNC: 105 U/L (ref 45–117)
ALT SERPL-CCNC: 67 U/L (ref 17–63)
ANION GAP SERPL CALC-SCNC: 6 MMOL/L (ref 0–18)
AST SERPL-CCNC: 39 U/L (ref 15–41)
BASOPHILS # BLD AUTO: 0.06 X10(3) UL (ref 0–0.1)
BASOPHILS NFR BLD AUTO: 0.6 %
BILIRUB SERPL-MCNC: 0.5 MG/DL (ref 0.1–2)
BUN BLD-MCNC: 7 MG/DL (ref 8–20)
BUN/CREAT SERPL: 8.5 (ref 10–20)
CALCIUM BLD-MCNC: 9.1 MG/DL (ref 8.3–10.3)
CHLORIDE SERPL-SCNC: 106 MMOL/L (ref 101–111)
CHOLEST SMN-MCNC: 233 MG/DL (ref ?–200)
CO2 SERPL-SCNC: 27 MMOL/L (ref 22–32)
COMPLEXED PSA SERPL-MCNC: 1.2 NG/ML (ref 0.01–4)
CREAT BLD-MCNC: 0.82 MG/DL (ref 0.7–1.3)
EOSINOPHIL # BLD AUTO: 0.25 X10(3) UL (ref 0–0.3)
EOSINOPHIL NFR BLD AUTO: 2.6 %
ERYTHROCYTE [DISTWIDTH] IN BLOOD BY AUTOMATED COUNT: 12.5 % (ref 11.5–16)
GLOBULIN PLAS-MCNC: 4.1 G/DL (ref 2.5–3.7)
GLUCOSE BLD-MCNC: 85 MG/DL (ref 70–99)
HCT VFR BLD AUTO: 49.5 % (ref 37–53)
HDLC SERPL-MCNC: 35 MG/DL (ref 40–59)
HGB BLD-MCNC: 16.4 G/DL (ref 13–17)
IMMATURE GRANULOCYTE COUNT: 0.06 X10(3) UL (ref 0–1)
IMMATURE GRANULOCYTE RATIO %: 0.6 %
LDLC SERPL CALC-MCNC: 162 MG/DL (ref ?–100)
LYMPHOCYTES # BLD AUTO: 2.85 X10(3) UL (ref 0.9–4)
LYMPHOCYTES NFR BLD AUTO: 29.3 %
M PROTEIN MFR SERPL ELPH: 7.9 G/DL (ref 6.1–8.3)
MCH RBC QN AUTO: 31.8 PG (ref 27–33.2)
MCHC RBC AUTO-ENTMCNC: 33.1 G/DL (ref 31–37)
MCV RBC AUTO: 96.1 FL (ref 80–99)
MONOCYTES # BLD AUTO: 0.73 X10(3) UL (ref 0.1–1)
MONOCYTES NFR BLD AUTO: 7.5 %
NEUTROPHIL ABS PRELIM: 5.77 X10 (3) UL (ref 1.3–6.7)
NEUTROPHILS # BLD AUTO: 5.77 X10(3) UL (ref 1.3–6.7)
NEUTROPHILS NFR BLD AUTO: 59.4 %
NONHDLC SERPL-MCNC: 198 MG/DL (ref ?–130)
OSMOLALITY SERPL CALC.SUM OF ELEC: 285 MOSM/KG (ref 275–295)
PLATELET # BLD AUTO: 234 10(3)UL (ref 150–450)
POTASSIUM SERPL-SCNC: 4.1 MMOL/L (ref 3.6–5.1)
RBC # BLD AUTO: 5.15 X10(6)UL (ref 4.3–5.7)
RED CELL DISTRIBUTION WIDTH-SD: 44.2 FL (ref 35.1–46.3)
SODIUM SERPL-SCNC: 139 MMOL/L (ref 136–144)
TRIGL SERPL-MCNC: 180 MG/DL (ref 30–149)
TSI SER-ACNC: 2.12 MIU/ML (ref 0.35–5.5)
VLDLC SERPL CALC-MCNC: 36 MG/DL (ref 0–30)
WBC # BLD AUTO: 9.7 X10(3) UL (ref 4–13)

## 2018-08-08 PROCEDURE — 36415 COLL VENOUS BLD VENIPUNCTURE: CPT | Performed by: INTERNAL MEDICINE

## 2018-08-08 PROCEDURE — 80061 LIPID PANEL: CPT | Performed by: INTERNAL MEDICINE

## 2018-08-08 PROCEDURE — 84153 ASSAY OF PSA TOTAL: CPT | Performed by: INTERNAL MEDICINE

## 2018-08-08 PROCEDURE — 80050 GENERAL HEALTH PANEL: CPT | Performed by: INTERNAL MEDICINE

## 2018-09-19 ENCOUNTER — OFFICE VISIT (OUTPATIENT)
Dept: INTERNAL MEDICINE CLINIC | Facility: CLINIC | Age: 53
End: 2018-09-19
Payer: COMMERCIAL

## 2018-09-19 VITALS
SYSTOLIC BLOOD PRESSURE: 128 MMHG | WEIGHT: 300.69 LBS | RESPIRATION RATE: 12 BRPM | HEIGHT: 70 IN | HEART RATE: 76 BPM | DIASTOLIC BLOOD PRESSURE: 84 MMHG | TEMPERATURE: 98 F | BODY MASS INDEX: 43.05 KG/M2

## 2018-09-19 DIAGNOSIS — L98.9 SKIN LESION: ICD-10-CM

## 2018-09-19 DIAGNOSIS — G43.709 CHRONIC MIGRAINE WITHOUT AURA WITHOUT STATUS MIGRAINOSUS, NOT INTRACTABLE: ICD-10-CM

## 2018-09-19 DIAGNOSIS — I10 ESSENTIAL HYPERTENSION: ICD-10-CM

## 2018-09-19 DIAGNOSIS — Z23 NEED FOR VACCINATION: ICD-10-CM

## 2018-09-19 DIAGNOSIS — F32.A DEPRESSION, UNSPECIFIED DEPRESSION TYPE: ICD-10-CM

## 2018-09-19 DIAGNOSIS — E78.5 HYPERLIPIDEMIA, UNSPECIFIED HYPERLIPIDEMIA TYPE: ICD-10-CM

## 2018-09-19 PROCEDURE — 90471 IMMUNIZATION ADMIN: CPT | Performed by: INTERNAL MEDICINE

## 2018-09-19 PROCEDURE — 99214 OFFICE O/P EST MOD 30 MIN: CPT | Performed by: INTERNAL MEDICINE

## 2018-09-19 PROCEDURE — 90686 IIV4 VACC NO PRSV 0.5 ML IM: CPT | Performed by: INTERNAL MEDICINE

## 2018-09-19 RX ORDER — ATORVASTATIN CALCIUM 10 MG/1
10 TABLET, FILM COATED ORAL NIGHTLY
Qty: 30 TABLET | Refills: 1 | Status: SHIPPED | OUTPATIENT
Start: 2018-09-19 | End: 2018-11-15

## 2018-09-19 NOTE — PROGRESS NOTES
[facility]    CHIEF COMPLAINT:  Patient presents with:  Medication Follow-Up: will schedule colonoscopy  Imm/Inj: requesting flu shot        HISTORY OF PRESENT ILLNESS:  Here for follow up and med check. Htn: at goal upon recheck.  Taking atenol DiphenhydrAMINE HCl (BENADRYL) 25 MG Oral Tab Take 25 mg by mouth every 6 (six) hours as needed. Disp:  Rfl:    HYDROcodone-acetaminophen  MG Oral Tab Take 1-2 tablets by mouth 2 (two) times daily as needed for Pain.  Disp: 28 tablet Rfl: 0   Methyl Result Value Ref Range    Cholesterol, Total 233 (H) <200 mg/dL    HDL Cholesterol 35 (L) 40 - 59 mg/dL    Triglycerides 180 (H) 30 - 149 mg/dL    LDL Cholesterol 162 (H) <100 mg/dL    VLDL 36 (H) 0 - 30 mg/dL    Non HDL Chol 198 (H) <130 mg/dL   TSH W R clinic in 6 months for med check.     Danielle Anaya MD

## 2018-10-15 RX ORDER — ATENOLOL 25 MG/1
25 TABLET ORAL 2 TIMES DAILY
Qty: 180 TABLET | Refills: 1 | Status: SHIPPED | OUTPATIENT
Start: 2018-10-15 | End: 2019-04-10

## 2018-10-30 ENCOUNTER — LAB ENCOUNTER (OUTPATIENT)
Dept: LAB | Age: 53
End: 2018-10-30
Attending: INTERNAL MEDICINE
Payer: COMMERCIAL

## 2018-10-30 DIAGNOSIS — E78.5 HYPERLIPIDEMIA, UNSPECIFIED HYPERLIPIDEMIA TYPE: ICD-10-CM

## 2018-10-30 PROCEDURE — 80076 HEPATIC FUNCTION PANEL: CPT | Performed by: INTERNAL MEDICINE

## 2018-10-30 PROCEDURE — 36415 COLL VENOUS BLD VENIPUNCTURE: CPT | Performed by: INTERNAL MEDICINE

## 2018-10-31 ENCOUNTER — TELEPHONE (OUTPATIENT)
Dept: INTERNAL MEDICINE CLINIC | Facility: CLINIC | Age: 53
End: 2018-10-31

## 2018-10-31 NOTE — TELEPHONE ENCOUNTER
Patient saw lab results on MyChart and is concerned about them. He would like to talk to a nurse. Thank you!

## 2018-10-31 NOTE — PROGRESS NOTES
Spoke to patient, aware of results, and recommendations. Pt voiced understanding. Patient to redo lab in a month (ordered).

## 2018-11-15 DIAGNOSIS — E78.5 HYPERLIPIDEMIA, UNSPECIFIED HYPERLIPIDEMIA TYPE: ICD-10-CM

## 2018-11-16 RX ORDER — ATORVASTATIN CALCIUM 10 MG/1
TABLET, FILM COATED ORAL
Qty: 30 TABLET | Refills: 0 | Status: SHIPPED | OUTPATIENT
Start: 2018-11-16 | End: 2018-12-07

## 2018-11-16 NOTE — TELEPHONE ENCOUNTER
Last OV relevant to medication: 9/19/18  Last refill date: 9/19/18     #/refills: 30/1  When pt was asked to return for OV: 6 mo  Upcoming appt/reason: 3/20/19 med check  Pt is due to have HFP at the end of Nov, spoke to pt and reminded him.   He will have

## 2018-12-05 ENCOUNTER — LAB ENCOUNTER (OUTPATIENT)
Dept: LAB | Age: 53
End: 2018-12-05
Attending: INTERNAL MEDICINE
Payer: COMMERCIAL

## 2018-12-05 DIAGNOSIS — R74.8 ELEVATED LIVER ENZYMES: ICD-10-CM

## 2018-12-05 PROCEDURE — 80076 HEPATIC FUNCTION PANEL: CPT | Performed by: INTERNAL MEDICINE

## 2018-12-05 PROCEDURE — 36415 COLL VENOUS BLD VENIPUNCTURE: CPT | Performed by: INTERNAL MEDICINE

## 2018-12-07 NOTE — PROGRESS NOTES
Spoke to patient, aware of results, and recommendations. Pt voiced understanding. US and lab ordered. Atorvastatin discontinued for now.

## 2018-12-14 ENCOUNTER — OFFICE VISIT (OUTPATIENT)
Dept: PAIN CLINIC | Facility: CLINIC | Age: 53
End: 2018-12-14
Payer: COMMERCIAL

## 2018-12-14 ENCOUNTER — TELEPHONE (OUTPATIENT)
Dept: PAIN CLINIC | Facility: CLINIC | Age: 53
End: 2018-12-14

## 2018-12-14 VITALS
SYSTOLIC BLOOD PRESSURE: 122 MMHG | OXYGEN SATURATION: 98 % | HEART RATE: 68 BPM | WEIGHT: 300 LBS | DIASTOLIC BLOOD PRESSURE: 80 MMHG | HEIGHT: 72 IN | BODY MASS INDEX: 40.63 KG/M2

## 2018-12-14 DIAGNOSIS — M46.1 SACROILIITIS (HCC): Primary | ICD-10-CM

## 2018-12-14 DIAGNOSIS — M54.16 LUMBAR RADICULOPATHY: ICD-10-CM

## 2018-12-14 DIAGNOSIS — M47.817 SPONDYLOSIS OF LUMBOSACRAL REGION, UNSPECIFIED SPINAL OSTEOARTHRITIS COMPLICATION STATUS: ICD-10-CM

## 2018-12-14 PROCEDURE — 99214 OFFICE O/P EST MOD 30 MIN: CPT | Performed by: NURSE PRACTITIONER

## 2018-12-14 RX ORDER — METHYLPREDNISOLONE 4 MG/1
TABLET ORAL
Qty: 1 KIT | Refills: 0 | Status: SHIPPED | OUTPATIENT
Start: 2018-12-14 | End: 2019-04-03 | Stop reason: ALTCHOICE

## 2018-12-14 RX ORDER — DICLOFENAC SODIUM 75 MG/1
75 TABLET, DELAYED RELEASE ORAL EVERY 12 HOURS PRN
Qty: 60 TABLET | Refills: 0 | Status: SHIPPED | OUTPATIENT
Start: 2018-12-14 | End: 2019-01-24

## 2018-12-14 NOTE — PROGRESS NOTES
Name: Isadora Szymanski   : 10/20/1965   DOS: 2018     Pain Clinic Follow Up Visit:   Isadora Szymanski is a 48year old male who presents for recommendations for right sided gluteus, hip, and groin pain.  This pain is different than his usual pain which was History of intractable migraines. Denies weakness or bowel/bladder incontinence associated with the pain. Current Outpatient Medications:  methylPREDNISolone (MEDROL) 4 MG Oral Tablet Therapy Pack Take as directed.  Disp: 1 kit Rfl: 0   Diclofenac Sodi to pain. Walks with antalgic gait. Psychiatric: Appropriate mood. Back: Pain with flexion, extension, right leaning and twisting motions. Pain to palpation over sacroiliac joint on the right, sacral thrust positive on the right.  Straight leg raises are to prior examination. This is effacing the right lateral recess and likely impinging on the right L5 nerve root. No significant spinal canal stenosis. Mild right neural foraminal   stenosis.   L5-S1: No disc herniation or spinal canal or neuroforaminal s office if this medication is not sufficient to help manage pain. Radiology orders and consultations:None    The patient indicates understanding of these issues and agrees to the plan. Return in about 2 months (around 2/14/2019).     RAMON Chavez

## 2018-12-14 NOTE — TELEPHONE ENCOUNTER
Medical clearance needed- no    Pt seen in OV today by Selma and recommended for SIJI and TLESI with Dr. Judith Olivera (X 2). Please begin PA process for procedure(s). 1. Laterality: right   Level(s): SI joint    2.  Laterality: right TLESI  Level(s): L2-5

## 2018-12-14 NOTE — PATIENT INSTRUCTIONS
Refill policies:    • Allow 2-3 business days for refills; controlled substances may take longer.   • Contact your pharmacy at least 5 days prior to running out of medication and have them send an electronic request or submit request through the “request re entire amount billed. Precertification and Prior Authorizations: If your physician has recommended that you have a procedure or additional testing performed.   [facility]) will contact your insurance carrier to obtain pre-certi

## 2018-12-19 ENCOUNTER — HOSPITAL ENCOUNTER (OUTPATIENT)
Dept: ULTRASOUND IMAGING | Age: 53
Discharge: HOME | End: 2018-12-19
Attending: INTERNAL MEDICINE
Payer: COMMERCIAL

## 2018-12-19 DIAGNOSIS — R74.8 ELEVATED LIVER ENZYMES: ICD-10-CM

## 2018-12-19 PROCEDURE — 76700 US EXAM ABDOM COMPLETE: CPT | Performed by: INTERNAL MEDICINE

## 2018-12-20 ENCOUNTER — TELEPHONE (OUTPATIENT)
Dept: INTERNAL MEDICINE CLINIC | Facility: CLINIC | Age: 53
End: 2018-12-20

## 2018-12-20 NOTE — TELEPHONE ENCOUNTER
Patients wife Carley Szymanski (on HIPAA) calling regarding patient recent Liver ultrasound results (stated were uploaded to [address] Patient would like to know 's recommendations/next steps in regards to the results. Please advise. Thank you!

## 2018-12-20 NOTE — TELEPHONE ENCOUNTER
Spoke  With Ashlie from [place]- no PA needed for any outpatient procedure with this patients health plan  Call reference number [id number]  Call time 25:30

## 2018-12-20 NOTE — PROGRESS NOTES
Patients wife call to discuss as they have seen the results on Mychart. Please call patient or wife to discuss.

## 2019-01-02 ENCOUNTER — TELEPHONE (OUTPATIENT)
Dept: SURGERY | Facility: CLINIC | Age: 54
End: 2019-01-02

## 2019-01-02 NOTE — TELEPHONE ENCOUNTER
Spoke to patient, confirmed procedure date of 1/8/19 and to be checked in at outpatient registration at 7:15 am. Patient called pre-procedure line and understood instructions.  Patient instructed to call office if there are additional questions

## 2019-01-08 ENCOUNTER — HOSPITAL ENCOUNTER (OUTPATIENT)
Facility: HOSPITAL | Age: 54
Setting detail: HOSPITAL OUTPATIENT SURGERY
Discharge: HOME | End: 2019-01-08
Attending: ANESTHESIOLOGY | Admitting: ANESTHESIOLOGY
Payer: COMMERCIAL

## 2019-01-08 ENCOUNTER — APPOINTMENT (OUTPATIENT)
Dept: GENERAL RADIOLOGY | Facility: HOSPITAL | Age: 54
End: 2019-01-08
Attending: ANESTHESIOLOGY
Payer: COMMERCIAL

## 2019-01-08 VITALS
DIASTOLIC BLOOD PRESSURE: 103 MMHG | RESPIRATION RATE: 18 BRPM | HEART RATE: 62 BPM | TEMPERATURE: 98 F | SYSTOLIC BLOOD PRESSURE: 160 MMHG | OXYGEN SATURATION: 94 %

## 2019-01-08 DIAGNOSIS — M47.817 SPONDYLOSIS OF LUMBOSACRAL REGION, UNSPECIFIED SPINAL OSTEOARTHRITIS COMPLICATION STATUS: ICD-10-CM

## 2019-01-08 DIAGNOSIS — M46.1 SACROILIITIS (HCC): ICD-10-CM

## 2019-01-08 PROCEDURE — 3E0U33Z INTRODUCTION OF ANTI-INFLAMMATORY INTO JOINTS, PERCUTANEOUS APPROACH: ICD-10-PCS | Performed by: ANESTHESIOLOGY

## 2019-01-08 PROCEDURE — 3E0U3BZ INTRODUCTION OF ANESTHETIC AGENT INTO JOINTS, PERCUTANEOUS APPROACH: ICD-10-PCS | Performed by: ANESTHESIOLOGY

## 2019-01-08 PROCEDURE — 99152 MOD SED SAME PHYS/QHP 5/>YRS: CPT | Performed by: ANESTHESIOLOGY

## 2019-01-08 RX ORDER — LIDOCAINE HYDROCHLORIDE 10 MG/ML
INJECTION, SOLUTION EPIDURAL; INFILTRATION; INTRACAUDAL; PERINEURAL AS NEEDED
Status: DISCONTINUED | OUTPATIENT
Start: 2019-01-08 | End: 2019-01-08 | Stop reason: HOSPADM

## 2019-01-08 RX ORDER — ONDANSETRON 2 MG/ML
4 INJECTION INTRAMUSCULAR; INTRAVENOUS ONCE AS NEEDED
Status: DISCONTINUED | OUTPATIENT
Start: 2019-01-08 | End: 2019-01-08 | Stop reason: HOSPADM

## 2019-01-08 RX ORDER — SODIUM CHLORIDE, SODIUM LACTATE, POTASSIUM CHLORIDE, CALCIUM CHLORIDE 600; 310; 30; 20 MG/100ML; MG/100ML; MG/100ML; MG/100ML
100 INJECTION, SOLUTION INTRAVENOUS CONTINUOUS
Status: CANCELLED | OUTPATIENT
Start: 2019-01-08

## 2019-01-08 RX ORDER — MIDAZOLAM HYDROCHLORIDE 1 MG/ML
INJECTION INTRAMUSCULAR; INTRAVENOUS AS NEEDED
Status: DISCONTINUED | OUTPATIENT
Start: 2019-01-08 | End: 2019-01-08 | Stop reason: HOSPADM

## 2019-01-08 RX ORDER — DIPHENHYDRAMINE HYDROCHLORIDE 50 MG/ML
50 INJECTION INTRAMUSCULAR; INTRAVENOUS ONCE AS NEEDED
Status: DISCONTINUED | OUTPATIENT
Start: 2019-01-08 | End: 2019-01-08

## 2019-01-08 RX ORDER — ONDANSETRON 2 MG/ML
4 INJECTION INTRAMUSCULAR; INTRAVENOUS ONCE AS NEEDED
Status: DISCONTINUED | OUTPATIENT
Start: 2019-01-08 | End: 2019-01-08

## 2019-01-08 RX ORDER — ONDANSETRON 2 MG/ML
4 INJECTION INTRAMUSCULAR; INTRAVENOUS ONCE AS NEEDED
Status: CANCELLED | OUTPATIENT
Start: 2019-01-08 | End: 2019-01-08

## 2019-01-08 RX ORDER — METHYLPREDNISOLONE ACETATE 40 MG/ML
INJECTION, SUSPENSION INTRA-ARTICULAR; INTRALESIONAL; INTRAMUSCULAR; SOFT TISSUE AS NEEDED
Status: DISCONTINUED | OUTPATIENT
Start: 2019-01-08 | End: 2019-01-08 | Stop reason: HOSPADM

## 2019-01-08 RX ORDER — SODIUM CHLORIDE, SODIUM LACTATE, POTASSIUM CHLORIDE, CALCIUM CHLORIDE 600; 310; 30; 20 MG/100ML; MG/100ML; MG/100ML; MG/100ML
100 INJECTION, SOLUTION INTRAVENOUS CONTINUOUS
Status: DISCONTINUED | OUTPATIENT
Start: 2019-01-08 | End: 2019-01-08

## 2019-01-08 NOTE — H&P
History & Physical Examination    Patient Name: Willadean Dakins  MRN: [id number]  [facility]: [id number]  YOB: 1965    Pre-Operative Diagnosis:  [facility] [M46.1]  Spondylosis of lumbosacral region, unspecified spinal osteoarthritis complicati EVERY MORNING. Disp: 30 tablet Rfl: 0 Taking   DiphenhydrAMINE HCl (BENADRYL) 25 MG Oral Tab Take 25 mg by mouth every 6 (six) hours as needed.  Disp:  Rfl:  Taking       Current Facility-Administered Medications:  lactated ringers infusion 100 mL/hr Zenia Mckeon • Unspecified essential hypertension    • Vasovagal syncope      Past Surgical History:   Procedure Laterality Date   • OTHER SURGICAL HISTORY  5/2005    pilonidal cystectomy   • TONSILLECTOMY  1993   • TRANSFORAMINAL LUMBAR EPIDURAL STEROID INJECTION MU EXTREMITIES [x ] [x ]    OTHER        [ x ] I have discussed the risks and benefits and alternatives with the patient/family. They understand and agree to proceed with plan of care.   [ x ] I have reviewed the History and Physical done within the last 30

## 2019-01-08 NOTE — OPERATIVE REPORT
[facility]  Operative Report  2019     Brandi Stevenson Minor Patient Status:  Hospital Outpatient Surgery    10/20/1965 MRN [id number]   [facility] Att[facility] Hardy Matthews MD   Hosp Day # 0 PCP Carlo Subramanian MD tissues for local anesthesia. Under fluoroscopic guidance, the anterior and posterior aspects of the sacroiliac joint were overlapped. A 22-gauge, Quincke spinal needle was advanced into the middle portion of the corresponding sacroiliac joint.  After the

## 2019-01-09 ENCOUNTER — TELEPHONE (OUTPATIENT)
Dept: SURGERY | Facility: CLINIC | Age: 54
End: 2019-01-09

## 2019-01-09 NOTE — TELEPHONE ENCOUNTER
Discussed w/ Dr. Ernestine Theodore. No interactions btwn Aimovig and upcoming epidural injections. Relayed info to pt. Verbalized understanding w/ no further needs.

## 2019-01-09 NOTE — TELEPHONE ENCOUNTER
Marnieomia Lies was recently prescribed for him, 70 ml auto injector once a month. Would like to know if there is any reason he should not take this month's dose because of the upcoming epidural on Tuesday.  Pharmacist didn't think it would be problem if injected a

## 2019-01-10 ENCOUNTER — TELEPHONE (OUTPATIENT)
Dept: SURGERY | Facility: CLINIC | Age: 54
End: 2019-01-10

## 2019-01-10 ENCOUNTER — OFFICE VISIT (OUTPATIENT)
Dept: SURGERY | Facility: CLINIC | Age: 54
End: 2019-01-10
Payer: COMMERCIAL

## 2019-01-10 VITALS
WEIGHT: 300 LBS | HEART RATE: 75 BPM | SYSTOLIC BLOOD PRESSURE: 135 MMHG | BODY MASS INDEX: 42 KG/M2 | DIASTOLIC BLOOD PRESSURE: 84 MMHG | TEMPERATURE: 98 F | HEIGHT: 71 IN

## 2019-01-10 DIAGNOSIS — I10 ESSENTIAL HYPERTENSION: ICD-10-CM

## 2019-01-10 DIAGNOSIS — E66.9 OBESITY, UNSPECIFIED CLASSIFICATION, UNSPECIFIED OBESITY TYPE, UNSPECIFIED WHETHER SERIOUS COMORBIDITY PRESENT: ICD-10-CM

## 2019-01-10 DIAGNOSIS — K80.20 CALCULUS OF GALLBLADDER WITHOUT CHOLECYSTITIS WITHOUT OBSTRUCTION: Primary | ICD-10-CM

## 2019-01-10 DIAGNOSIS — R79.89 ELEVATED LFTS: ICD-10-CM

## 2019-01-10 DIAGNOSIS — G43.709 CHRONIC MIGRAINE WITHOUT AURA WITHOUT STATUS MIGRAINOSUS, NOT INTRACTABLE: ICD-10-CM

## 2019-01-10 PROCEDURE — 99243 OFF/OP CNSLTJ NEW/EST LOW 30: CPT | Performed by: COLON & RECTAL SURGERY

## 2019-01-10 NOTE — TELEPHONE ENCOUNTER
Spoke to patient, confirmed procedure date of 1/15/19 and to be checked in at outpatient registration at 9:30 am. Patient called pre-procedure line and understood instructions.  Patient instructed to call office if there are additional questions

## 2019-01-11 NOTE — H&P
New Patient Visit Note       Active Problems      1. Calculus of gallbladder without cholecystitis without obstruction    2. Elevated LFTs    3. Chronic migraine without aura without status migrainosus, not intractable    4.  Obesity, unspecified classifica intolerance. The patient's family history is significant for gallbladder disease. His mother and 11 of his 6 sisters have all had their gallbladders removed at a young age.     The patient's medical history is significant as noted above for migraines, hy • Knee pain, left 4/3/2007   • Lipid screening 12/9/2010   • Migraine    • Migraines    • MTHFR (methylene THF reductase) deficiency and homocystinuria ([address]     ?    • Multiple food allergies    • Obesity, unspecified    • Ocular migraine    • Post traum dementia[other]) Maternal Grandmother    • Other (alzheimer's dementia[other]) Maternal Grandfather    • Other (lung cancer[other]) Maternal Aunt    • Heart Attack Maternal Uncle         smoker   • Heart Disease Neg      Social History    Socioeconomic His afternoon. (Patient taking differently: take 2 by mouth in the morning  and 2 at noon, and 1 each afternoon. ) Disp: 150 tablet Rfl: 0   LYRICA 25 MG Oral Cap Take 25 mg by mouth.  Take 1 tablet every 3 hours while awake  Up to 5 tablets per day  Disp:  Rfl rhythm. No murmur heard. Pulmonary/Chest: Effort normal and breath sounds normal. No respiratory distress. He has no wheezes. He has no rales. Abdominal: Soft. Bowel sounds are normal. He exhibits no distension. There is no tenderness.  There is no liban medications which prevent him from having any significant headache as a symptom of his migraines, but he does have occasional word finding difficulties and other symptoms.     Aside from the nausea and diarrhea related to his migraines, the patient denies a shoulder and back, postprandial nausea/vomiting, intolerance to fatty foods, and diarrhea. If he develops any of these symptoms, he should feel free to contact our office at any time. All questions were answered. The patient is happy with our plan.   H

## 2019-01-11 NOTE — PATIENT INSTRUCTIONS
The patient is a pleasant 37-year-old male who presents in consultation for gallstones and elevated liver function tests. He is referred to me by his primary provider, Dr. Lily Eric.     The patient had elevated liver function tests (AST and ALT) noted on routi cystectomy in 2005. No prior abdominal operations. He does get injections in the lower back for lumbar pain. He has had a previous colonoscopy with removal of rectal polyp. Physical exam: The abdomen is soft, not and nontender in all 4 quadrants.   No

## 2019-01-15 ENCOUNTER — HOSPITAL ENCOUNTER (OUTPATIENT)
Facility: HOSPITAL | Age: 54
Setting detail: HOSPITAL OUTPATIENT SURGERY
Discharge: HOME | End: 2019-01-15
Attending: ANESTHESIOLOGY | Admitting: ANESTHESIOLOGY
Payer: COMMERCIAL

## 2019-01-15 ENCOUNTER — APPOINTMENT (OUTPATIENT)
Dept: GENERAL RADIOLOGY | Facility: HOSPITAL | Age: 54
End: 2019-01-15
Attending: ANESTHESIOLOGY
Payer: COMMERCIAL

## 2019-01-15 VITALS
SYSTOLIC BLOOD PRESSURE: 115 MMHG | HEART RATE: 57 BPM | DIASTOLIC BLOOD PRESSURE: 74 MMHG | TEMPERATURE: 97 F | OXYGEN SATURATION: 96 % | RESPIRATION RATE: 16 BRPM

## 2019-01-15 DIAGNOSIS — M54.16 LUMBAR RADICULOPATHY: ICD-10-CM

## 2019-01-15 PROCEDURE — 3E0R33Z INTRODUCTION OF ANTI-INFLAMMATORY INTO SPINAL CANAL, PERCUTANEOUS APPROACH: ICD-10-PCS | Performed by: ANESTHESIOLOGY

## 2019-01-15 PROCEDURE — 3E0R3BZ INTRODUCTION OF ANESTHETIC AGENT INTO SPINAL CANAL, PERCUTANEOUS APPROACH: ICD-10-PCS | Performed by: ANESTHESIOLOGY

## 2019-01-15 PROCEDURE — 99152 MOD SED SAME PHYS/QHP 5/>YRS: CPT | Performed by: ANESTHESIOLOGY

## 2019-01-15 PROCEDURE — 3E0R3KZ INTRODUCTION OF OTHER DIAGNOSTIC SUBSTANCE INTO SPINAL CANAL, PERCUTANEOUS APPROACH: ICD-10-PCS | Performed by: ANESTHESIOLOGY

## 2019-01-15 RX ORDER — DEXAMETHASONE SODIUM PHOSPHATE 10 MG/ML
INJECTION, SOLUTION INTRAMUSCULAR; INTRAVENOUS AS NEEDED
Status: DISCONTINUED | OUTPATIENT
Start: 2019-01-15 | End: 2019-01-15 | Stop reason: HOSPADM

## 2019-01-15 RX ORDER — ONDANSETRON 2 MG/ML
4 INJECTION INTRAMUSCULAR; INTRAVENOUS ONCE AS NEEDED
Status: DISCONTINUED | OUTPATIENT
Start: 2019-01-15 | End: 2019-01-15

## 2019-01-15 RX ORDER — DIPHENHYDRAMINE HYDROCHLORIDE 50 MG/ML
50 INJECTION INTRAMUSCULAR; INTRAVENOUS ONCE AS NEEDED
Status: DISCONTINUED | OUTPATIENT
Start: 2019-01-15 | End: 2019-01-15

## 2019-01-15 RX ORDER — ONDANSETRON 2 MG/ML
4 INJECTION INTRAMUSCULAR; INTRAVENOUS ONCE AS NEEDED
Status: DISCONTINUED | OUTPATIENT
Start: 2019-01-15 | End: 2019-01-15 | Stop reason: HOSPADM

## 2019-01-15 RX ORDER — SODIUM CHLORIDE, SODIUM LACTATE, POTASSIUM CHLORIDE, CALCIUM CHLORIDE 600; 310; 30; 20 MG/100ML; MG/100ML; MG/100ML; MG/100ML
100 INJECTION, SOLUTION INTRAVENOUS CONTINUOUS
Status: DISCONTINUED | OUTPATIENT
Start: 2019-01-15 | End: 2019-01-15

## 2019-01-15 RX ORDER — MIDAZOLAM HYDROCHLORIDE 1 MG/ML
INJECTION INTRAMUSCULAR; INTRAVENOUS AS NEEDED
Status: DISCONTINUED | OUTPATIENT
Start: 2019-01-15 | End: 2019-01-15 | Stop reason: HOSPADM

## 2019-01-15 RX ORDER — LIDOCAINE HYDROCHLORIDE 10 MG/ML
INJECTION, SOLUTION EPIDURAL; INFILTRATION; INTRACAUDAL; PERINEURAL AS NEEDED
Status: DISCONTINUED | OUTPATIENT
Start: 2019-01-15 | End: 2019-01-15 | Stop reason: HOSPADM

## 2019-01-15 NOTE — H&P
History & Physical Examination    Patient Name: Alexis Guillen  MRN: [id number]  CSN: [id number]  YOB: 1965    Pre-Operative Diagnosis:  Lumbar radiculopathy [M54.16]    Present Illness: A 48year old male with low back pain is here for right t Rfl:  Taking       Current Facility-Administered Medications:  lactated ringers infusion 100 mL/hr Intravenous Continuous   ondansetron HCl (ZOFRAN) injection 4 mg 4 mg Intravenous Once PRN       Allergies:   Cucumber                HIVES, RASH, Tightness MAIN OR   • TONSILLECTOMY  1993   • TRANSFORAMINAL LUMBAR EPIDURAL STEROID INJECTION MULTIPLE LEVEL Right 7/31/2018    Performed by Maryana Quintana MD at [facility] MAIN OR   • TRANSFORAMINAL LUMBAR EPIDURAL STEROID INJECTION MULTIPLE LEVEL Bilateral 3/8/2018 plan of care. [ x ] I have reviewed the History and Physical done within the last 30 days. Any changes noted above.     RAMON Loaiza

## 2019-01-15 NOTE — OPERATIVE REPORT
[facility]  Operative Report  1/15/2019     Mary Bravo Minor Patient Status:  Hospital Outpatient Surgery    10/20/1965 MRN [id number]   [facility] No att[facility] providers found   [facility] Day # 0 PCP Surendra Connell MD tissue was anesthetized via 25-gauge 1.5 inch needle with approximately 2 mL of 1% lidocaine.   A 22-gauge 5 inch Quincke spinal needle was introduced toward the inferior aspect of the junction between the transverse process and pedicle of the right L2-L3 l

## 2019-01-25 RX ORDER — DICLOFENAC SODIUM 75 MG/1
75 TABLET, DELAYED RELEASE ORAL EVERY 12 HOURS PRN
Qty: 60 TABLET | Refills: 0 | Status: SHIPPED | OUTPATIENT
Start: 2019-01-25 | End: 2019-02-22

## 2019-01-25 NOTE — TELEPHONE ENCOUNTER
Medication: Diclofenac Sodium 75 MG Oral Tab EC    Date of last refill: 12/14/18  [place]    Last office visit: 12/14/18  Due back to clinic per last office note:  Return in about 2 months (around 2/14/2019).   Date n

## 2019-02-06 ENCOUNTER — TELEPHONE (OUTPATIENT)
Dept: INTERNAL MEDICINE CLINIC | Facility: CLINIC | Age: 54
End: 2019-02-06

## 2019-02-06 NOTE — TELEPHONE ENCOUNTER
LM for patient to advised him to have his hepatic function panel done. Patient is overdue for this lab.

## 2019-02-25 RX ORDER — DICLOFENAC SODIUM 75 MG/1
75 TABLET, DELAYED RELEASE ORAL EVERY 12 HOURS PRN
Qty: 60 TABLET | Refills: 0 | Status: SHIPPED | OUTPATIENT
Start: 2019-02-25 | End: 2019-06-11

## 2019-02-25 NOTE — TELEPHONE ENCOUNTER
Medication: DICLOFENAC SODIUM 75 MG Oral Tab EC    Date of last refill: 1/25/19  [place]    Last office visit: 12/14/18  Due back to clinic per last office note:  Return in about 2 months (around 2/14/2019).      Date

## 2019-04-03 ENCOUNTER — OFFICE VISIT (OUTPATIENT)
Dept: INTERNAL MEDICINE CLINIC | Facility: CLINIC | Age: 54
End: 2019-04-03
Payer: COMMERCIAL

## 2019-04-03 VITALS
DIASTOLIC BLOOD PRESSURE: 88 MMHG | TEMPERATURE: 99 F | HEIGHT: 71 IN | HEART RATE: 72 BPM | SYSTOLIC BLOOD PRESSURE: 132 MMHG | BODY MASS INDEX: 41.88 KG/M2 | RESPIRATION RATE: 16 BRPM | WEIGHT: 299.13 LBS

## 2019-04-03 DIAGNOSIS — G43.709 CHRONIC MIGRAINE WITHOUT AURA WITHOUT STATUS MIGRAINOSUS, NOT INTRACTABLE: ICD-10-CM

## 2019-04-03 DIAGNOSIS — R79.89 ELEVATED LFTS: ICD-10-CM

## 2019-04-03 DIAGNOSIS — E78.5 HYPERLIPIDEMIA, UNSPECIFIED HYPERLIPIDEMIA TYPE: ICD-10-CM

## 2019-04-03 DIAGNOSIS — I10 ESSENTIAL HYPERTENSION: Primary | ICD-10-CM

## 2019-04-03 PROCEDURE — 99214 OFFICE O/P EST MOD 30 MIN: CPT | Performed by: INTERNAL MEDICINE

## 2019-04-03 RX ORDER — DICLOFENAC SODIUM 75 MG/1
75 TABLET, DELAYED RELEASE ORAL 2 TIMES DAILY
COMMUNITY
End: 2019-07-15

## 2019-04-03 NOTE — PROGRESS NOTES
[facility] [employer]    CHIEF COMPLAINT:  Patient presents with:  Medication Follow-Up: has not had colonoscopy. HISTORY OF PRESENT ILLNESS:  Here for med check. Htn: Taking meds regularly. No chest pain, no shortness of breath.      Hyperlipidemia and 1 each afternoon. ) Disp: 150 tablet Rfl: 0   LYRICA 25 MG Oral Cap Take 25 mg by mouth. Take 1 tablet every 3 hours while awake  Up to 5 tablets per day  Disp:  Rfl: 3   Sertraline HCl (ZOLOFT) 100 MG Oral Tab TAKE 1 TABLET BY MOUTH EVERY MORNING.  Dis months for cpx.     Chacorta Wallace MD

## 2019-04-12 RX ORDER — ATENOLOL 25 MG/1
25 TABLET ORAL 2 TIMES DAILY
Qty: 180 TABLET | Refills: 1 | Status: SHIPPED | OUTPATIENT
Start: 2019-04-12 | End: 2019-10-10

## 2019-05-22 ENCOUNTER — LAB ENCOUNTER (OUTPATIENT)
Dept: LAB | Age: 54
End: 2019-05-22
Attending: INTERNAL MEDICINE
Payer: COMMERCIAL

## 2019-05-22 DIAGNOSIS — E78.5 HYPERLIPIDEMIA, UNSPECIFIED HYPERLIPIDEMIA TYPE: ICD-10-CM

## 2019-05-22 DIAGNOSIS — R79.89 ELEVATED LFTS: ICD-10-CM

## 2019-05-22 DIAGNOSIS — R74.8 ELEVATED LIVER ENZYMES: ICD-10-CM

## 2019-05-22 PROCEDURE — 36415 COLL VENOUS BLD VENIPUNCTURE: CPT | Performed by: INTERNAL MEDICINE

## 2019-05-22 PROCEDURE — 80053 COMPREHEN METABOLIC PANEL: CPT | Performed by: INTERNAL MEDICINE

## 2019-05-22 PROCEDURE — 80061 LIPID PANEL: CPT | Performed by: INTERNAL MEDICINE

## 2019-05-22 PROCEDURE — 82248 BILIRUBIN DIRECT: CPT | Performed by: INTERNAL MEDICINE

## 2019-05-24 NOTE — PROGRESS NOTES
Spoke to pt, aware of results. Pt voiced understanding. Pt asking if he should restart on statin therapy?

## 2019-05-28 NOTE — PROGRESS NOTES
Spoke to pt, aware of recommendations. Pt voiced understanding. Referral order for Dr. Alethea Petit entered. Per pt request, sent [employer] message with referral information.

## 2019-06-11 RX ORDER — DICLOFENAC SODIUM 75 MG/1
75 TABLET, DELAYED RELEASE ORAL EVERY 12 HOURS PRN
Qty: 60 TABLET | Refills: 0 | Status: SHIPPED | OUTPATIENT
Start: 2019-06-11 | End: 2019-07-10

## 2019-06-11 NOTE — TELEPHONE ENCOUNTER
Medication: DICLOFENAC SODIUM 75 MG Oral Tab EC     Date of last refill: 2/25/19  [place]    Last office visit: 12/14/18  Due back to clinic per last office note:  Return in about 2 months (around 2/14/2019).   Date n

## 2019-07-10 RX ORDER — DICLOFENAC SODIUM 75 MG/1
75 TABLET, DELAYED RELEASE ORAL EVERY 12 HOURS PRN
Qty: 60 TABLET | Refills: 0 | Status: SHIPPED | OUTPATIENT
Start: 2019-07-10 | End: 2019-08-28

## 2019-07-15 ENCOUNTER — TELEPHONE (OUTPATIENT)
Dept: PAIN CLINIC | Facility: CLINIC | Age: 54
End: 2019-07-15

## 2019-07-15 ENCOUNTER — OFFICE VISIT (OUTPATIENT)
Dept: PAIN CLINIC | Facility: CLINIC | Age: 54
End: 2019-07-15
Payer: COMMERCIAL

## 2019-07-15 VITALS
HEART RATE: 63 BPM | OXYGEN SATURATION: 95 % | BODY MASS INDEX: 42.95 KG/M2 | WEIGHT: 300 LBS | DIASTOLIC BLOOD PRESSURE: 90 MMHG | SYSTOLIC BLOOD PRESSURE: 128 MMHG | HEIGHT: 70 IN

## 2019-07-15 DIAGNOSIS — M54.16 LUMBAR RADICULITIS: Primary | ICD-10-CM

## 2019-07-15 DIAGNOSIS — M54.14 THORACIC RADICULITIS: ICD-10-CM

## 2019-07-15 PROCEDURE — 99214 OFFICE O/P EST MOD 30 MIN: CPT | Performed by: ANESTHESIOLOGY

## 2019-07-15 RX ORDER — GABAPENTIN 300 MG/1
TABLET, FILM COATED ORAL
Refills: 6 | COMMUNITY
Start: 2019-06-07

## 2019-07-15 NOTE — TELEPHONE ENCOUNTER
Pt informed of updated PA feedback. Spoke with patient and reviewed pre-op instructions at time of OV. Patient verbalized understanding, and agreeable to holding Diclofenac/NSAID medications as indicated below.   Encouraged pt to contact office if hankins · A family member or friend is required to stay in our waiting room because of the sedation you will receive, you may be sleepy and forgetful. You may not remember anything told to you after your procedure including discharge instructions.   · Please park i · Coumadin                                                       5 days  ? Procedure may be cancelled if INR is elevated. · Excedrin (with aspirin)                                7 days  · Plavix (Clopidogrel)  ?  Epidural If you are a diabetic, please increase the frequency of your glucose monitoring after the procedure as this may cause a temporary increase in your blood sugar.   Contact your primary care physician if your blood sugar rises as you may require some medicatio Will I be \"put out\" for the transforaminal injection? This procedure is done with a local anesthetic. Some patients choose to receive intravenous sedation that can make the procedure easier to tolerate.  Patients may experience some amnesia from [address] How many transforaminal injections do I need to have? If the first transforaminal injection does not relieve your symptoms within one week, a second injection may be recommended.  Similarly, if the second transforaminal injection does not completely reliev

## 2019-07-15 NOTE — PROGRESS NOTES
Spoke with patient and reviewed pre-op instructions. Patient verbalized understanding, and agreeable to holding Diclofenac/NSAID medications as indicated below.   Encouraged pt to contact office if changes in health status occur (including recent antibiotic ? Please park in the [employer] parking garage and follow the signs to the [employer] [address]. ? Please bring your Insurance Card, Photo ID, List of Current Medications and Referral (if applicable) to your appointment. Check in at [facility] ([address] [place] [address].  [place] insurances are now requiring a preauthorization for all procedures. In the event that your insurance does not authorize your procedure within 48 hours of the scheduled date, your procedure will be cancelled and rescheduled to a later date.    Please The long acting anti-inflammatory medication, or steroid, that is injected reduces inflammation and swelling of spinal nerve roots and other tissues surrounding the nerve root.  This may reduce pain, tingling and numbness and other symptoms caused by such i What should I expect after the transforaminal injection? Immediately after the injection, your arm or leg may feel slightly heavy and may be numb. This may be due to the location where the injection was done and how much local anesthetic was used.  Despite Patients who have pain radiating from the spine down into the arms or legs respond better to the injections than patients who have only pure neck or back pain.  Patients with a recent onset of pain may respond much better than patients with longstanding rubin

## 2019-07-15 NOTE — PATIENT INSTRUCTIONS
Refill policies:    • Allow 2-3 business days for refills; controlled substances may take longer.   • Contact your pharmacy at least 5 days prior to running out of medication and have them send an electronic request or submit request through the “request re Depending on your insurance carrier, approval may take 3-10 days. It is highly recommended patients contact their insurance carrier directly to determine coverage.   If test is done without insurance authorization, patient may be responsible for the entire 7/25/19   Check-In Time:   7:00am    ** TO AVOID CANCELLATION AND/OR RESCHEDULING: PLEASE CALL [facility] PRE-PROCEDURE LINE -[phone] FOR DETAILED INSTRUCTIONS FIVE TO SEVEN DAYS PRIOR TO PROCEDURE**        Prior to the procedure:  Please update us prior t medications:  • Aggrenox 10 days   • Agrylin (Anagrelide) 10 days   • Enbrel (Etanercept) 24 hours   • Fragmin (Dalteparin) 24 hours   • Pletal (Cilostazol) 7 days  • Effient (Prasugrel) 7 days  • Pradaxa 10 days  • Trental 7 days  • Eliquis (Apixaban) 3 d increase in your blood sugar. Contact your primary care physician if your blood sugar rises as you may require some medication adjustment.         It is normal to have increased pain at injection site for up to 3-5 days after procedure, you can        use can make the procedure easier to tolerate. Patients may experience some amnesia from sedation and might not remember parts or all of the actual procedure. How is the transforaminal injection performed?   It is done either with the patient on their side fo recommended. Similarly, if the second transforaminal injection does not completely relieve your symptoms in 1 to 2 weeks a third injection may be recommended. Can I have more than three transforaminal injections?   In a six-month period, most patients do

## 2019-07-15 NOTE — TELEPHONE ENCOUNTER
Prior authorization request completed for: TLESI   Authorization #No Prior Authorization is Required   Spoke with Ana Irizarry at [facility] [phone]  Reference #:[id number]    Time:23:21    Authorization dates: N/A   CPT codes approved: 70481,05907  Number o

## 2019-07-15 NOTE — PROGRESS NOTES
Patient presents in office today with reported pain in right scapula radiating to to right thigh. In room with linda Garcia to hear PHI.     Current pain level reported = 2/10, activity increases pain (bending and picking up items or sitting down from standing

## 2019-07-16 ENCOUNTER — TELEPHONE (OUTPATIENT)
Dept: SURGERY | Facility: CLINIC | Age: 54
End: 2019-07-16

## 2019-07-16 RX ORDER — ATENOLOL 25 MG/1
25 TABLET ORAL 2 TIMES DAILY
Qty: 180 TABLET | Refills: 1 | OUTPATIENT
Start: 2019-07-16

## 2019-07-16 NOTE — TELEPHONE ENCOUNTER
Patient is scheduled for 2 mri's on 07-24-19 and I need the progress notes in order to obtain authorization, please.

## 2019-07-18 NOTE — PROGRESS NOTES
Name: Mamie Gowers Minor   : 10/20/1965   DOS: 7/15/2019     Pain Clinic Follow Up Visit:   Mamie Gowers Minor is a 48year old male who presents for recheck of his chronic low back pain.   He is status post right transforaminal lumbar epidural steroid injection 6 m Loperamide HCl 2 MG Oral Cap Take 2 mg by mouth as needed for Diarrhea. Disp:  Rfl:    HYDROcodone-acetaminophen  MG Oral Tab Take 1-2 tablets by mouth 2 (two) times daily as needed for Pain.  Disp: 28 tablet Rfl: 0   Ondansetron HCl (ZOFRAN) 4 mg t benefits of the procedure were discussed with the patient. The patient wanted to proceed with the procedure. I also recommended the patient to go for MRI of thoracic and lumbar spine. Because the patient has new symptom.     Orders:No orders of the defin

## 2019-07-19 ENCOUNTER — HOSPITAL ENCOUNTER (OUTPATIENT)
Dept: MRI IMAGING | Age: 54
Discharge: HOME | End: 2019-07-19
Attending: NURSE PRACTITIONER
Payer: COMMERCIAL

## 2019-07-19 ENCOUNTER — TELEPHONE (OUTPATIENT)
Dept: SURGERY | Facility: CLINIC | Age: 54
End: 2019-07-19

## 2019-07-19 DIAGNOSIS — M54.16 LUMBAR RADICULITIS: ICD-10-CM

## 2019-07-19 DIAGNOSIS — M54.14 THORACIC RADICULITIS: ICD-10-CM

## 2019-07-19 PROCEDURE — 72146 MRI CHEST SPINE W/O DYE: CPT | Performed by: NURSE PRACTITIONER

## 2019-07-19 NOTE — TELEPHONE ENCOUNTER
Left message for patient, confirmed procedure date of 7/25/19 with Dr Jeovany Cummins and to be checked in at outpatient registration at 7:00 am. Patient instructed to call pre-procedure line before procedure at [phone].  Patient instructed to call office if ther

## 2019-07-24 ENCOUNTER — HOSPITAL ENCOUNTER (OUTPATIENT)
Dept: MRI IMAGING | Facility: HOSPITAL | Age: 54
Discharge: HOME | End: 2019-07-24
Attending: NURSE PRACTITIONER
Payer: COMMERCIAL

## 2019-07-24 PROCEDURE — 72148 MRI LUMBAR SPINE W/O DYE: CPT | Performed by: NURSE PRACTITIONER

## 2019-07-25 ENCOUNTER — APPOINTMENT (OUTPATIENT)
Dept: GENERAL RADIOLOGY | Facility: HOSPITAL | Age: 54
End: 2019-07-25
Attending: ANESTHESIOLOGY
Payer: COMMERCIAL

## 2019-07-25 ENCOUNTER — HOSPITAL ENCOUNTER (OUTPATIENT)
Facility: HOSPITAL | Age: 54
Setting detail: HOSPITAL OUTPATIENT SURGERY
Discharge: HOME | End: 2019-07-25
Attending: ANESTHESIOLOGY | Admitting: ANESTHESIOLOGY
Payer: COMMERCIAL

## 2019-07-25 VITALS
OXYGEN SATURATION: 96 % | HEART RATE: 61 BPM | RESPIRATION RATE: 18 BRPM | SYSTOLIC BLOOD PRESSURE: 150 MMHG | DIASTOLIC BLOOD PRESSURE: 95 MMHG | TEMPERATURE: 98 F

## 2019-07-25 DIAGNOSIS — M54.16 LUMBAR RADICULITIS: ICD-10-CM

## 2019-07-25 PROCEDURE — 3E0R33Z INTRODUCTION OF ANTI-INFLAMMATORY INTO SPINAL CANAL, PERCUTANEOUS APPROACH: ICD-10-PCS | Performed by: ANESTHESIOLOGY

## 2019-07-25 PROCEDURE — 99152 MOD SED SAME PHYS/QHP 5/>YRS: CPT | Performed by: ANESTHESIOLOGY

## 2019-07-25 PROCEDURE — 3E0R3BZ INTRODUCTION OF ANESTHETIC AGENT INTO SPINAL CANAL, PERCUTANEOUS APPROACH: ICD-10-PCS | Performed by: ANESTHESIOLOGY

## 2019-07-25 RX ORDER — MIDAZOLAM HYDROCHLORIDE 1 MG/ML
INJECTION INTRAMUSCULAR; INTRAVENOUS AS NEEDED
Status: DISCONTINUED | OUTPATIENT
Start: 2019-07-25 | End: 2019-07-25

## 2019-07-25 RX ORDER — DEXAMETHASONE SODIUM PHOSPHATE 10 MG/ML
INJECTION, SOLUTION INTRAMUSCULAR; INTRAVENOUS AS NEEDED
Status: DISCONTINUED | OUTPATIENT
Start: 2019-07-25 | End: 2019-07-25

## 2019-07-25 RX ORDER — ONDANSETRON 2 MG/ML
4 INJECTION INTRAMUSCULAR; INTRAVENOUS ONCE AS NEEDED
Status: DISCONTINUED | OUTPATIENT
Start: 2019-07-25 | End: 2019-07-25

## 2019-07-25 RX ORDER — SODIUM CHLORIDE, SODIUM LACTATE, POTASSIUM CHLORIDE, CALCIUM CHLORIDE 600; 310; 30; 20 MG/100ML; MG/100ML; MG/100ML; MG/100ML
100 INJECTION, SOLUTION INTRAVENOUS CONTINUOUS
Status: DISCONTINUED | OUTPATIENT
Start: 2019-07-25 | End: 2019-07-25

## 2019-07-25 RX ORDER — DIPHENHYDRAMINE HYDROCHLORIDE 50 MG/ML
50 INJECTION INTRAMUSCULAR; INTRAVENOUS ONCE AS NEEDED
Status: DISCONTINUED | OUTPATIENT
Start: 2019-07-25 | End: 2019-07-25

## 2019-07-25 RX ORDER — LIDOCAINE HYDROCHLORIDE 10 MG/ML
INJECTION, SOLUTION EPIDURAL; INFILTRATION; INTRACAUDAL; PERINEURAL AS NEEDED
Status: DISCONTINUED | OUTPATIENT
Start: 2019-07-25 | End: 2019-07-25

## 2019-07-25 NOTE — H&P
History & Physical Examination    Patient Name: Alexis Guillen  MRN: [id number]  [facility]: [id number]  YOB: 1965    Pre-Operative Diagnosis:  Lumbar radiculitis [M54.16]    Present Illness: A 48year old male with low back pain is here for right tra Taking   Sertraline HCl (ZOLOFT) 100 MG Oral Tab TAKE 1 TABLET BY MOUTH EVERY MORNING. Disp: 30 tablet Rfl: 0 Taking   DiphenhydrAMINE HCl (BENADRYL) 25 MG Oral Tab Take 25 mg by mouth every 6 (six) hours as needed.  Disp:  Rfl:  Taking       Current Facili syncope      Past Surgical History:   Procedure Laterality Date   • OTHER SURGICAL HISTORY  5/2005    pilonidal cystectomy   • SACROILIAC JOINT INJECTION RIGHT OR LEFT Right 1/8/2019    Performed by Sabra Mcgill MD at [place] [address] Normal If not normal, please explain:   HEENT [x ] [x ]    NECK & BACK [ ] [ ] Pain to the low back and radiates down the right    HEART [x ] [x ]    LUNGS [x ] [x ]    ABDOMEN [x ] [x ]    UROGENITAL [x ] [x ]    EXTREMITIES [x ] [x ]    OTHER        [ x

## 2019-07-25 NOTE — OPERATIVE REPORT
[facility]  Operative Report  2019     Brian Snider Minor Patient Status:  Hospital Outpatient Surgery    10/20/1965 MRN [id number]   [facility] ENDOSCOPY Attending Stephanie Santiago MD   Hosp Day # 0 PCP Radha Tellez MD     Indication: anesthetized via 25-gauge 1.5 inch needle with approximately 2 mL of 1% lidocaine.   A 22-gauge 5 inch Quincke spinal needle was introduced toward the inferior aspect of the junction between the transverse process and pedicle of the right L2-L3 level atraum

## 2019-08-28 ENCOUNTER — OFFICE VISIT (OUTPATIENT)
Dept: PAIN CLINIC | Facility: CLINIC | Age: 54
End: 2019-08-28
Payer: COMMERCIAL

## 2019-08-28 ENCOUNTER — TELEPHONE (OUTPATIENT)
Dept: PAIN CLINIC | Facility: CLINIC | Age: 54
End: 2019-08-28

## 2019-08-28 VITALS
SYSTOLIC BLOOD PRESSURE: 132 MMHG | HEIGHT: 70 IN | WEIGHT: 300 LBS | DIASTOLIC BLOOD PRESSURE: 84 MMHG | BODY MASS INDEX: 42.95 KG/M2 | OXYGEN SATURATION: 98 % | HEART RATE: 57 BPM

## 2019-08-28 DIAGNOSIS — M47.817 LUMBOSACRAL SPONDYLOSIS WITHOUT MYELOPATHY: ICD-10-CM

## 2019-08-28 DIAGNOSIS — M47.816 LUMBAR FACET ARTHROPATHY: Primary | ICD-10-CM

## 2019-08-28 DIAGNOSIS — M46.1 SACROILIITIS (HCC): ICD-10-CM

## 2019-08-28 PROCEDURE — 99214 OFFICE O/P EST MOD 30 MIN: CPT | Performed by: ANESTHESIOLOGY

## 2019-08-28 RX ORDER — PREGABALIN 25 MG/1
CAPSULE ORAL
Refills: 5 | COMMUNITY
Start: 2019-08-06 | End: 2019-08-28

## 2019-08-28 RX ORDER — DICLOFENAC SODIUM 75 MG/1
75 TABLET, DELAYED RELEASE ORAL EVERY 12 HOURS PRN
Qty: 60 TABLET | Refills: 0 | Status: SHIPPED | OUTPATIENT
Start: 2019-08-28 | End: 2019-09-23

## 2019-08-28 NOTE — PROGRESS NOTES
Patient presents in office today with reported pain in bilateral low back into back of bilateral thighs, groin/sacral area. Patient reports it has been painful to sit with noted pressure and pain.       F/u post RIGHT TRANSFORAMINAL LUMBAR EPIDURAL STEROID

## 2019-08-28 NOTE — TELEPHONE ENCOUNTER
Medication: DICLOFENAC SODIUM 75 MG Oral Tab EC    Date of last refill: 7/10/19  [place]    Last office visit: 8/28/19  Due back to clinic per last office note:  Open note  Date next office visit scheduled:  Open not

## 2019-08-28 NOTE — TELEPHONE ENCOUNTER
Medical clearance needed- No    Pt seen in OV today by OSBALDO Ribeiro and recommended for right lumbar facet injections (X 2), and bilateral SIJI. Please begin PA process for procedure(s).      Right Lumbar Facets   Laterality: Right   Level(s): L2-3, L5-S1

## 2019-08-28 NOTE — PATIENT INSTRUCTIONS
Refill policies:    • Allow 2-3 business days for refills; controlled substances may take longer.   • Contact your pharmacy at least 5 days prior to running out of medication and have them send an electronic request or submit request through the “request re Depending on your insurance carrier, approval may take 3-10 days. It is highly recommended patients contact their insurance carrier directly to determine coverage.   If test is done without insurance authorization, patient may be responsible for the entire TBD   Check-In Time: TBD    ** TO AVOID CANCELLATION AND/OR RESCHEDULING: PLEASE CALL [facility] PRE-PROCEDURE LINE -[phone] FOR DETAILED INSTRUCTIONS FIVE TO SEVEN DAYS PRIOR TO PROCEDURE**        Prior to the procedure:  Please update us prior to the pro medications:  • Aggrenox 10 days   • Agrylin (Anagrelide) 10 days   • Enbrel (Etanercept) 24 hours   • Fragmin (Dalteparin) 24 hours   • Pletal (Cilostazol) 7 days  • Effient (Prasugrel) 7 days  • Pradaxa 10 days  • Trental 7 days  • Eliquis (Apixaban) 3 d increase in your blood sugar. Contact your primary care physician if your blood sugar rises as you may require some medication adjustment.         It is normal to have increased pain at injection site for up to 3-5 days after procedure, you can        use

## 2019-08-28 NOTE — TELEPHONE ENCOUNTER
Prior authorization request completed for: FACET and [address] #No Prior Authorization is Required   Spoke with Kodi Gotti at [facility] [phone]  Reference #:[id number]    Time:15:57    Authorization dates: N/A   CPT codes approved: 78627,84591,196

## 2019-08-28 NOTE — PROGRESS NOTES
Spoke with patient and scheduled injection(s). Reviewed pre-op instructions. Patient verbalized understanding, and agreeable to holding diclofenac medications as indicated below.   Encouraged pt to contact office if changes in health status occur (including ? Please park in the [employer] parking garage and follow the signs to the [employer]. ? Please bring your Insurance Card, Photo ID, List of Current Medications and Referral (if applicable) to your appointment. Check in at [facility] ([address] [place] [address].  [place] insurances are now requiring a preauthorization for all procedures. In the event that your insurance does not authorize your procedure within 48 hours of the scheduled date, your procedure will be cancelled and rescheduled to a later date.    Please

## 2019-08-29 NOTE — TELEPHONE ENCOUNTER
Spoke with patient's wife and she asked if she could call back in an hour to schedule injections.      When returns call patient to be scheduled for bilateral sacroiliac joint injection followed by right diagnostic lumbar facet joint injection (as per OV no

## 2019-08-29 NOTE — TELEPHONE ENCOUNTER
Spoke w/ spouse Dago Sinha and informed of message below. Pt spouse states patient agreeable to scheduling. Spoke with patient spouse and scheduled injection(s). Reviewed pre-op instructions.  Patient spouse verbalized understanding, and agreeable to pat ? A family member or friend is required to stay in our waiting room because of the sedation you will receive, you may be sleepy and forgetful. You may not remember anything told to you after your procedure including discharge instructions.   ? Please park i · Tumeric, Fish oil, krill oil, vitamin E                                    Insurance Authorization:   Most insurances are now requiring a preauthorization for all procedures.   In the event that your insurance does not authorize your procedure within 48 h The steroid injected reduces the inflammation and swelling of tissue in and around the joint space. This may in turn reduce pain, and other symptoms caused by inflammation or irritation of the joint and surrounding structures. What is actually injected? You must have a ride home because the injection may cause some temporary weakness in the legs if the medication spreads to the sciatic nerve in front of the joint. All patients receiving sedation must have a ride home.  We advise the patients to take it eas Generally speaking, this procedure is safe. However, with any procedure there are risks, side effects and the possibility of complications. The most common side effect is temporary pain at the injection site.  Other risks include but are not limited  to inf The facet joint injection consists of a local anesthetic, and a steroid. Will the facet joint injection hurt? The facet joint injection involves inserting a needle through skin and deeper tissues. There is some pain involved.  However the skin and deepe The steroid starts working in about 3 to 5 days and its effect can last for several days to several months. How many facet joint injections do I need to have?    If the first facet joint injection does not relieve your symptoms within one week, a second i

## 2019-08-29 NOTE — PROGRESS NOTES
Name: Marisela Szymanski   : 10/20/1965   DOS: 2019     Pain Clinic Follow Up Visit:   Marisela Szymanski is a 48year old male who presents for recheck of his chronic low back pain.   He is status post right transforaminal lumbar epidural steroid injection and 1 by mouth each afternoon. (Patient taking differently: take 2 by mouth in the morning  and 2 at noon, and 1 each afternoon. ) Disp: 150 tablet Rfl: 0   LYRICA 25 MG Oral Cap Take 25 mg by mouth.  Take 1 tablet every 3 hours while awake  Up to 5 tablets per consultations:None    The patient indicates understanding of these issues and agrees to the plan. No follow-ups on file.     Verena Nolasco MD, 8/28/2019, 11:02 PM

## 2019-09-06 ENCOUNTER — TELEPHONE (OUTPATIENT)
Dept: PAIN CLINIC | Facility: CLINIC | Age: 54
End: 2019-09-06

## 2019-09-06 NOTE — TELEPHONE ENCOUNTER
Spoke to patient confirmed procedure date of 9/12/2019 and to be checked in at outpatient registration at 12:45pm. Patient called pre-procedure line and understood instructions.  Patient instructed to call office if there are additional questions after list

## 2019-09-12 ENCOUNTER — HOSPITAL ENCOUNTER (OUTPATIENT)
Facility: HOSPITAL | Age: 54
Setting detail: HOSPITAL OUTPATIENT SURGERY
Discharge: HOME | End: 2019-09-12
Attending: ANESTHESIOLOGY | Admitting: ANESTHESIOLOGY
Payer: COMMERCIAL

## 2019-09-12 ENCOUNTER — APPOINTMENT (OUTPATIENT)
Dept: GENERAL RADIOLOGY | Facility: HOSPITAL | Age: 54
End: 2019-09-12
Attending: ANESTHESIOLOGY
Payer: COMMERCIAL

## 2019-09-12 VITALS
OXYGEN SATURATION: 97 % | RESPIRATION RATE: 16 BRPM | SYSTOLIC BLOOD PRESSURE: 144 MMHG | DIASTOLIC BLOOD PRESSURE: 85 MMHG | TEMPERATURE: 97 F | HEART RATE: 64 BPM

## 2019-09-12 DIAGNOSIS — M47.817 LUMBOSACRAL SPONDYLOSIS WITHOUT MYELOPATHY: ICD-10-CM

## 2019-09-12 DIAGNOSIS — M46.1 SACROILIITIS (HCC): ICD-10-CM

## 2019-09-12 PROCEDURE — 99152 MOD SED SAME PHYS/QHP 5/>YRS: CPT | Performed by: ANESTHESIOLOGY

## 2019-09-12 PROCEDURE — 3E0U3BZ INTRODUCTION OF ANESTHETIC AGENT INTO JOINTS, PERCUTANEOUS APPROACH: ICD-10-PCS | Performed by: ANESTHESIOLOGY

## 2019-09-12 PROCEDURE — 3E0U33Z INTRODUCTION OF ANTI-INFLAMMATORY INTO JOINTS, PERCUTANEOUS APPROACH: ICD-10-PCS | Performed by: ANESTHESIOLOGY

## 2019-09-12 RX ORDER — DIPHENHYDRAMINE HYDROCHLORIDE 50 MG/ML
50 INJECTION INTRAMUSCULAR; INTRAVENOUS ONCE AS NEEDED
Status: DISCONTINUED | OUTPATIENT
Start: 2019-09-12 | End: 2019-09-12

## 2019-09-12 RX ORDER — MIDAZOLAM HYDROCHLORIDE 1 MG/ML
INJECTION INTRAMUSCULAR; INTRAVENOUS AS NEEDED
Status: DISCONTINUED | OUTPATIENT
Start: 2019-09-12 | End: 2019-09-12

## 2019-09-12 RX ORDER — ONDANSETRON 2 MG/ML
4 INJECTION INTRAMUSCULAR; INTRAVENOUS ONCE AS NEEDED
Status: DISCONTINUED | OUTPATIENT
Start: 2019-09-12 | End: 2019-09-12

## 2019-09-12 RX ORDER — LIDOCAINE HYDROCHLORIDE 10 MG/ML
INJECTION, SOLUTION EPIDURAL; INFILTRATION; INTRACAUDAL; PERINEURAL AS NEEDED
Status: DISCONTINUED | OUTPATIENT
Start: 2019-09-12 | End: 2019-09-12

## 2019-09-12 RX ORDER — SODIUM CHLORIDE, SODIUM LACTATE, POTASSIUM CHLORIDE, CALCIUM CHLORIDE 600; 310; 30; 20 MG/100ML; MG/100ML; MG/100ML; MG/100ML
100 INJECTION, SOLUTION INTRAVENOUS CONTINUOUS
Status: DISCONTINUED | OUTPATIENT
Start: 2019-09-12 | End: 2019-09-12

## 2019-09-12 RX ORDER — METHYLPREDNISOLONE ACETATE 40 MG/ML
INJECTION, SUSPENSION INTRA-ARTICULAR; INTRALESIONAL; INTRAMUSCULAR; SOFT TISSUE AS NEEDED
Status: DISCONTINUED | OUTPATIENT
Start: 2019-09-12 | End: 2019-09-12

## 2019-09-12 NOTE — OPERATIVE REPORT
[facility]  Operative Report  2019     Marianne Asencio Minor Patient Status:  Hospital Outpatient Surgery    10/20/1965 MRN [id number]   [facility] ENDOSCOPY Attending No att. providers found   [facility] Day # 0 PCP Brian Venegas MD     [facility] 22-gauge, Quincke spinal needle was advanced into the middle portion of the first sacroiliac joint. After the needle  positions were confirmed by AP and lateral views of fluoroscopy, 1 cc Omnipaque-240 was injected into the sacroiliac joint.  There was a ni

## 2019-09-12 NOTE — OR NURSING
Instructions given and questions addressed. Iv removed. Ambulating in room without assistance. Denies weakness or pain in legs.

## 2019-09-12 NOTE — H&P
History & Physical Examination    Patient Name: Marisol Pro  MRN: [id number]  CSN: [id number]  YOB: 1965    Pre-Operative Diagnosis:  [facility] [M46.1]  Lumbosacral spondylosis without myelopathy [M47.817]    Present Illness: A 48 y (BENADRYL) 25 MG Oral Tab Take 25 mg by mouth every 6 (six) hours as needed.  Disp:  Rfl:  Taking       Current Facility-Administered Medications:  lactated ringers infusion 100 mL/hr Intravenous Continuous   ondansetron HCl (ZOFRAN) injection 4 mg 4 mg Int INJECTION RIGHT OR LEFT Right 1/8/2019    Performed by Hermila Garibay MD at [address] [facility]   • TRANSFORAMINAL LUMBAR EPIDURAL STEROID INJECTION MULTIPLE LEVEL Right 7/25/2019    Performed by Hermila Garibay MD at [address] [facility] [place]   • T not normal, please explain:   HEENT [x ] [x ]    NECK & BACK [ ] [ ] Tenderness to palpation bilateral sacroiliac joints.    HEART [x ] [x ]    LUNGS [x ] [x ]    ABDOMEN [x ] [x ]    UROGENITAL [x ] [x ]    EXTREMITIES [x ] [x ]    OTHER        [ x ] I hav

## 2019-09-13 ENCOUNTER — TELEPHONE (OUTPATIENT)
Dept: PAIN CLINIC | Facility: CLINIC | Age: 54
End: 2019-09-13

## 2019-09-13 NOTE — TELEPHONE ENCOUNTER
Spoke to patient confirmed procedure Thurs., 9/19/2019, needs to be checked in at outpatient registration no later than 7:00am, Patient stated that he has done before but will call pre-procedure line before procedure at [phone]. Patient instructed to call office if there are additional questions after listening to pre-procedure line.

## 2019-09-19 ENCOUNTER — APPOINTMENT (OUTPATIENT)
Dept: GENERAL RADIOLOGY | Facility: HOSPITAL | Age: 54
End: 2019-09-19
Attending: ANESTHESIOLOGY
Payer: COMMERCIAL

## 2019-09-19 ENCOUNTER — HOSPITAL ENCOUNTER (OUTPATIENT)
Facility: HOSPITAL | Age: 54
Setting detail: HOSPITAL OUTPATIENT SURGERY
Discharge: HOME | End: 2019-09-19
Attending: ANESTHESIOLOGY | Admitting: ANESTHESIOLOGY
Payer: COMMERCIAL

## 2019-09-19 VITALS
TEMPERATURE: 98 F | OXYGEN SATURATION: 96 % | RESPIRATION RATE: 18 BRPM | HEART RATE: 56 BPM | DIASTOLIC BLOOD PRESSURE: 89 MMHG | SYSTOLIC BLOOD PRESSURE: 135 MMHG

## 2019-09-19 DIAGNOSIS — M47.816 LUMBAR FACET ARTHROPATHY: ICD-10-CM

## 2019-09-19 PROCEDURE — 3E0U3BZ INTRODUCTION OF ANESTHETIC AGENT INTO JOINTS, PERCUTANEOUS APPROACH: ICD-10-PCS | Performed by: ANESTHESIOLOGY

## 2019-09-19 PROCEDURE — 3E0U33Z INTRODUCTION OF ANTI-INFLAMMATORY INTO JOINTS, PERCUTANEOUS APPROACH: ICD-10-PCS | Performed by: ANESTHESIOLOGY

## 2019-09-19 PROCEDURE — 99152 MOD SED SAME PHYS/QHP 5/>YRS: CPT | Performed by: ANESTHESIOLOGY

## 2019-09-19 RX ORDER — ONDANSETRON 2 MG/ML
4 INJECTION INTRAMUSCULAR; INTRAVENOUS ONCE AS NEEDED
Status: DISCONTINUED | OUTPATIENT
Start: 2019-09-19 | End: 2019-09-19

## 2019-09-19 RX ORDER — LIDOCAINE HYDROCHLORIDE 10 MG/ML
INJECTION, SOLUTION EPIDURAL; INFILTRATION; INTRACAUDAL; PERINEURAL AS NEEDED
Status: DISCONTINUED | OUTPATIENT
Start: 2019-09-19 | End: 2019-09-19

## 2019-09-19 RX ORDER — DIPHENHYDRAMINE HYDROCHLORIDE 50 MG/ML
50 INJECTION INTRAMUSCULAR; INTRAVENOUS ONCE AS NEEDED
Status: DISCONTINUED | OUTPATIENT
Start: 2019-09-19 | End: 2019-09-19

## 2019-09-19 RX ORDER — SODIUM CHLORIDE, SODIUM LACTATE, POTASSIUM CHLORIDE, CALCIUM CHLORIDE 600; 310; 30; 20 MG/100ML; MG/100ML; MG/100ML; MG/100ML
100 INJECTION, SOLUTION INTRAVENOUS CONTINUOUS
Status: DISCONTINUED | OUTPATIENT
Start: 2019-09-19 | End: 2019-09-19

## 2019-09-19 RX ORDER — METHYLPREDNISOLONE ACETATE 40 MG/ML
INJECTION, SUSPENSION INTRA-ARTICULAR; INTRALESIONAL; INTRAMUSCULAR; SOFT TISSUE AS NEEDED
Status: DISCONTINUED | OUTPATIENT
Start: 2019-09-19 | End: 2019-09-19

## 2019-09-19 RX ORDER — MIDAZOLAM HYDROCHLORIDE 1 MG/ML
INJECTION INTRAMUSCULAR; INTRAVENOUS AS NEEDED
Status: DISCONTINUED | OUTPATIENT
Start: 2019-09-19 | End: 2019-09-19

## 2019-09-19 NOTE — H&P
History & Physical Examination    Patient Name: Junior Peraza  MRN: [id number]  CSN: [id number]  YOB: 1965    Pre-Operative Diagnosis:  Lumbar facet arthropathy [M47.816]    Present Illness: A 48year old male with low back pain is here for ri mg by mouth every 6 (six) hours as needed.  Disp:  Rfl:  Taking       Current Facility-Administered Medications:  lactated ringers infusion 100 mL/hr Intravenous Continuous   ondansetron HCl (ZOFRAN) injection 4 mg 4 mg Intravenous Once PRN       Allergies: 9/12/2019    Performed by Madeline Tolbert MD at [facility] LEFT Right 1/8/2019    Performed by Madeline Tolbert MD at [facility] MAIN OR   • [address] [place] [address]   • TRANSFORAMINAL LUMBAR EPIDURAL STEROID INJECTION MULTI standard drinks      SYSTEM Check if Review is Normal Check if Physical Exam is Normal If not normal, please explain:   HEENT [x ] [x ]    NECK & BACK [ ] [ ] No tenderness to palpation right lumbar area today; however, patient has pain from the shoulder b

## 2019-09-20 NOTE — OPERATIVE REPORT
[facility]  Operative Report  2019     [facility] Minor Patient Status:  Hospital Outpatient Surgery    10/20/1965 MRN [id number]   [facility] ENDOSCOPY Attending No att. providers found   [facility] Day # 0 PCP Danielle Anaya MD     Indicati corresponding facet joint at right L2-L3, L3-L4, L4-5 and L5-S1 level atraumatically under fluoroscopic guidance.   Following negative aspiration for CSF and blood, approximately 1 mL of 1% lidocaine with 20 mg of methylprednisolone was injected into each j

## 2019-09-23 RX ORDER — DICLOFENAC SODIUM 75 MG/1
75 TABLET, DELAYED RELEASE ORAL EVERY 12 HOURS PRN
Qty: 60 TABLET | Refills: 2 | Status: SHIPPED | OUTPATIENT
Start: 2019-09-23 | End: 2019-12-18

## 2019-10-07 ENCOUNTER — OFFICE VISIT (OUTPATIENT)
Dept: INTERNAL MEDICINE CLINIC | Facility: CLINIC | Age: 54
End: 2019-10-07
Payer: COMMERCIAL

## 2019-10-07 ENCOUNTER — LAB ENCOUNTER (OUTPATIENT)
Dept: LAB | Age: 54
End: 2019-10-07
Attending: INTERNAL MEDICINE
Payer: COMMERCIAL

## 2019-10-07 VITALS
SYSTOLIC BLOOD PRESSURE: 120 MMHG | DIASTOLIC BLOOD PRESSURE: 78 MMHG | WEIGHT: 298.13 LBS | RESPIRATION RATE: 16 BRPM | HEART RATE: 76 BPM | HEIGHT: 70 IN | BODY MASS INDEX: 42.68 KG/M2 | TEMPERATURE: 99 F

## 2019-10-07 DIAGNOSIS — Z23 NEED FOR VACCINATION: ICD-10-CM

## 2019-10-07 DIAGNOSIS — Z00.00 PHYSICAL EXAM, ANNUAL: ICD-10-CM

## 2019-10-07 DIAGNOSIS — Z12.5 SCREENING FOR PROSTATE CANCER: ICD-10-CM

## 2019-10-07 DIAGNOSIS — K76.0 FATTY LIVER: ICD-10-CM

## 2019-10-07 DIAGNOSIS — Z12.11 SCREENING FOR COLON CANCER: ICD-10-CM

## 2019-10-07 PROCEDURE — 90471 IMMUNIZATION ADMIN: CPT | Performed by: INTERNAL MEDICINE

## 2019-10-07 PROCEDURE — 99396 PREV VISIT EST AGE 40-64: CPT | Performed by: INTERNAL MEDICINE

## 2019-10-07 PROCEDURE — 36415 COLL VENOUS BLD VENIPUNCTURE: CPT | Performed by: INTERNAL MEDICINE

## 2019-10-07 PROCEDURE — 80050 GENERAL HEALTH PANEL: CPT | Performed by: INTERNAL MEDICINE

## 2019-10-07 PROCEDURE — 84153 ASSAY OF PSA TOTAL: CPT | Performed by: INTERNAL MEDICINE

## 2019-10-07 PROCEDURE — 90686 IIV4 VACC NO PRSV 0.5 ML IM: CPT | Performed by: INTERNAL MEDICINE

## 2019-10-07 NOTE — PROGRESS NOTES
[facility]    CHIEF COMPLAINT: Patient presents with:  Routine Physical: no FIT or Colon  Imm/Inj: requests flu shot         HPI:   Kiersten Stewart Minor is a 48year old male who presents for a complete physical exam.      Has not done colonoscopy.  He pl twice daily and 1 by mouth each afternoon. (Patient taking differently: take 2 by mouth in the morning  and 2 at noon, and 1 each afternoon. ) Disp: 150 tablet Rfl: 0   LYRICA 25 MG Oral Cap Take 25 mg by mouth.  Take 1 tablet every 3 hours while awake  Up • TONSILLECTOMY  1993   • TRANSFORAMINAL LUMBAR EPIDURAL STEROID INJECTION MULTIPLE LEVEL Right 7/25/2019    Performed by Chiara Hardwick MD at [facility] ENDOSCOPY   • TRANSFORAMINAL LUMBAR EPIDURAL STEROID INJECTION MULTIPLE LEVEL Right 1/15/2019    Performe otherwise  SKIN: denies any unusual skin lesions  EYES:denies blurred vision or double vision  HEENT: denies nasal congestion, sinus pain or ST  LUNGS: denies shortness of breath with exertion  CARDIOVASCULAR: denies chest pain on exertion  GI: denies abdo AST 32 05/22/2019 12:33 PM    ALT 53 05/22/2019 12:33 PM    BILT 0.4 05/22/2019 12:33 PM    TSH 2.120 08/08/2018 08:35 AM        Lab Results   Component Value Date/Time    CHOLEST 222 (H) 05/22/2019 12:33 PM    HDL 35 (L) 05/22/2019 12:33 PM    TRIG 140

## 2019-10-10 DIAGNOSIS — I10 ESSENTIAL HYPERTENSION: Primary | ICD-10-CM

## 2019-10-11 RX ORDER — ATENOLOL 25 MG/1
25 TABLET ORAL 2 TIMES DAILY
Qty: 180 TABLET | Refills: 1 | Status: SHIPPED | OUTPATIENT
Start: 2019-10-11 | End: 2020-03-19

## 2019-11-13 ENCOUNTER — HOSPITAL ENCOUNTER (OUTPATIENT)
Dept: ULTRASOUND IMAGING | Facility: HOSPITAL | Age: 54
Discharge: HOME | End: 2019-11-13
Attending: INTERNAL MEDICINE
Payer: COMMERCIAL

## 2019-11-13 DIAGNOSIS — K76.0 FATTY LIVER: ICD-10-CM

## 2019-11-13 PROCEDURE — 76705 ECHO EXAM OF ABDOMEN: CPT | Performed by: INTERNAL MEDICINE

## 2019-11-13 PROCEDURE — 76981 USE PARENCHYMA: CPT | Performed by: INTERNAL MEDICINE

## 2019-11-18 ENCOUNTER — OFFICE VISIT (OUTPATIENT)
Dept: SURGERY | Facility: CLINIC | Age: 54
End: 2019-11-18
Payer: COMMERCIAL

## 2019-11-18 ENCOUNTER — LAB ENCOUNTER (OUTPATIENT)
Dept: LAB | Facility: HOSPITAL | Age: 54
End: 2019-11-18
Attending: INTERNAL MEDICINE
Payer: COMMERCIAL

## 2019-11-18 VITALS
RESPIRATION RATE: 16 BRPM | HEART RATE: 65 BPM | SYSTOLIC BLOOD PRESSURE: 132 MMHG | DIASTOLIC BLOOD PRESSURE: 84 MMHG | BODY MASS INDEX: 43 KG/M2 | OXYGEN SATURATION: 94 % | WEIGHT: 297 LBS

## 2019-11-18 DIAGNOSIS — R79.89 ABNORMAL LIVER FUNCTION TESTS: ICD-10-CM

## 2019-11-18 DIAGNOSIS — E78.00 HIGH CHOLESTEROL: ICD-10-CM

## 2019-11-18 DIAGNOSIS — R79.89 ABNORMAL LIVER FUNCTION TESTS: Primary | ICD-10-CM

## 2019-11-18 PROCEDURE — 86803 HEPATITIS C AB TEST: CPT

## 2019-11-18 PROCEDURE — 87340 HEPATITIS B SURFACE AG IA: CPT

## 2019-11-18 PROCEDURE — 86709 HEPATITIS A IGM ANTIBODY: CPT

## 2019-11-18 PROCEDURE — 86706 HEP B SURFACE ANTIBODY: CPT

## 2019-11-18 PROCEDURE — 86708 HEPATITIS A ANTIBODY: CPT

## 2019-11-18 PROCEDURE — 82103 ALPHA-1-ANTITRYPSIN TOTAL: CPT

## 2019-11-18 PROCEDURE — 82728 ASSAY OF FERRITIN: CPT

## 2019-11-18 PROCEDURE — 36415 COLL VENOUS BLD VENIPUNCTURE: CPT

## 2019-11-18 PROCEDURE — 82104 ALPHA-1-ANTITRYPSIN PHENO: CPT

## 2019-11-18 PROCEDURE — 80076 HEPATIC FUNCTION PANEL: CPT

## 2019-11-18 RX ORDER — SIMVASTATIN 20 MG
20 TABLET ORAL NIGHTLY
Qty: 30 TABLET | Refills: 11 | Status: SHIPPED | OUTPATIENT
Start: 2019-11-18 | End: 2020-12-31

## 2019-12-02 ENCOUNTER — TELEPHONE (OUTPATIENT)
Dept: PAIN CLINIC | Facility: CLINIC | Age: 54
End: 2019-12-02

## 2019-12-02 NOTE — TELEPHONE ENCOUNTER
Spoke to pt to suggest a f/u office visit to discuss sx and recommendations. Placed on schedule for 12/3/19.

## 2019-12-03 ENCOUNTER — OFFICE VISIT (OUTPATIENT)
Dept: PAIN CLINIC | Facility: CLINIC | Age: 54
End: 2019-12-03
Payer: COMMERCIAL

## 2019-12-03 ENCOUNTER — TELEPHONE (OUTPATIENT)
Dept: PAIN CLINIC | Facility: CLINIC | Age: 54
End: 2019-12-03

## 2019-12-03 VITALS
HEIGHT: 70 IN | HEART RATE: 63 BPM | BODY MASS INDEX: 42.09 KG/M2 | DIASTOLIC BLOOD PRESSURE: 80 MMHG | WEIGHT: 294 LBS | OXYGEN SATURATION: 93 % | SYSTOLIC BLOOD PRESSURE: 130 MMHG

## 2019-12-03 DIAGNOSIS — M47.817 LUMBOSACRAL SPONDYLOSIS WITHOUT MYELOPATHY: ICD-10-CM

## 2019-12-03 DIAGNOSIS — M46.1 SACROILIITIS (HCC): Primary | ICD-10-CM

## 2019-12-03 DIAGNOSIS — M47.816 FACET ARTHROPATHY, LUMBAR: ICD-10-CM

## 2019-12-03 PROCEDURE — 99214 OFFICE O/P EST MOD 30 MIN: CPT | Performed by: NURSE PRACTITIONER

## 2019-12-03 NOTE — PROGRESS NOTES
Patient presents in office today with reported pain in right buttocks going down the right leg, mid back right side, right arm weakness.     Current pain level reported = 3/10

## 2019-12-03 NOTE — PATIENT INSTRUCTIONS
Refill policies:    • Allow 2-3 business days for refills; controlled substances may take longer.   • Contact your pharmacy at least 5 days prior to running out of medication and have them send an electronic request or submit request through the “request re Depending on your insurance carrier, approval may take 3-10 days. It is highly recommended patients contact their insurance carrier directly to determine coverage.   If test is done without insurance authorization, patient may be responsible for the entire TBD  Check-In Time: TBD    ** TO AVOID CANCELLATION AND/OR RESCHEDULING: PLEASE CALL [facility] PRE-PROCEDURE LINE -[phone] FOR DETAILED INSTRUCTIONS FIVE TO SEVEN DAYS PRIOR TO PROCEDURE**        Prior to the procedure:  Please update us prior to the proc medications:  • Aggrenox 10 days   • Agrylin (Anagrelide) 10 days  • Brilinta (Ticagrelor) 7 days  • Imbruvica (Ibrutinib) 3 days   • Enbrel (Etanercept) 24 hours   • Fragmin (Dalteparin) 24 hours   • Pletal (Cilostazol) 7 days  • Effient (Prasugrel) 7 day [phone]. If you are a diabetic, please increase the frequency of your glucose monitoring after the procedure as this may cause a temporary increase in your blood sugar.   Contact your primary care physician if your blood sugar rises as you may require

## 2019-12-03 NOTE — TELEPHONE ENCOUNTER
Medical clearance needed- NO     Pt seen in OV today by [facility] AP and recommended for Right RFA- SI (X 1) with Dr Jerris Cranker. Please begin PA process for procedure(s).      Laterality: Right   Level(s): SI    Pt informed callback will be given when PA feedback r

## 2019-12-03 NOTE — PROGRESS NOTES
Name: Darlin Szymanski   : 10/20/1965   DOS: 12/3/2019     Pain Clinic Follow Up Visit:   Darlin Szymanski is a 48year old male who presents for recheck of his chronic low back pain.  Patient is s/p right lumbar facet injection on 19 with 90% relief that i Dispersible, TK 1 T PO PRF HA, Disp: , Rfl: 5  ClonazePAM 0.5 MG Oral Tab, TAKE 1 TABLET BY MOUTH FIVE TIMES DAILY, Disp: 150 tablet, Rfl: 0  Methylphenidate HCl (RITALIN) 5 MG Oral Tab, take 2 by mouth twice daily and 1 by mouth each afternoon.  (Patient t that is continuing to last at this time (3 months). 4) F/U in clinic 4-6 weeks post the RFA. [place] reviewed today. Orders:No orders of the defined types were placed in this encounter.       Medications filled today:  Requested Prescriptions      No

## 2019-12-04 NOTE — TELEPHONE ENCOUNTER
Prior authorization request completed for: Right RFA SI   Authorization #No Prior Authorization is Required   Spoke with Dinh at [facility]  Reference #:[id number]    Time:17:45    CPT Code is Not handled through [place].      Authorization dates:

## 2019-12-05 NOTE — TELEPHONE ENCOUNTER
Spoke to patient's wife Kimberli Mcintosh to schedule procedure per patient request. Procedure scheduled, pre-procedure instructions reviewed. Patient prefers conscious sedation, reviewed ride and fasting requirements.  Patient advised to hold diclofenac for 24 hours p ? Please bring your Insurance Card, Photo ID, List of Current Medications and Referral (if applicable) to your appointment. Check in at [facility] ([address] [facility]. [address] [place] [address] [place]) outpatient registration in the [employer].   ? Please note-No p Most insurances are now requiring a preauthorization for all procedures. In the event that your insurance does not authorize your procedure within 48 hours of the scheduled date, your procedure will be cancelled and rescheduled to a later date.    Please

## 2019-12-10 ENCOUNTER — TELEPHONE (OUTPATIENT)
Dept: PAIN CLINIC | Facility: CLINIC | Age: 54
End: 2019-12-10

## 2019-12-10 NOTE — TELEPHONE ENCOUNTER
Left message for patient, confirmed procedure date of 12/17 and to be checked in at outpatient registration at 0800 am with patient's . Encouraged patient to listen to the pre-procedure line at [phone].  Patient instructed to call office if there

## 2019-12-17 ENCOUNTER — APPOINTMENT (OUTPATIENT)
Dept: GENERAL RADIOLOGY | Facility: HOSPITAL | Age: 54
End: 2019-12-17
Attending: ANESTHESIOLOGY
Payer: COMMERCIAL

## 2019-12-17 ENCOUNTER — HOSPITAL ENCOUNTER (OUTPATIENT)
Facility: HOSPITAL | Age: 54
Setting detail: HOSPITAL OUTPATIENT SURGERY
Discharge: HOME | End: 2019-12-17
Attending: ANESTHESIOLOGY | Admitting: ANESTHESIOLOGY
Payer: COMMERCIAL

## 2019-12-17 VITALS
HEART RATE: 56 BPM | DIASTOLIC BLOOD PRESSURE: 75 MMHG | SYSTOLIC BLOOD PRESSURE: 126 MMHG | RESPIRATION RATE: 16 BRPM | OXYGEN SATURATION: 95 % | TEMPERATURE: 98 F

## 2019-12-17 DIAGNOSIS — M46.1 SACROILIITIS (HCC): ICD-10-CM

## 2019-12-17 DIAGNOSIS — M47.817 LUMBOSACRAL SPONDYLOSIS WITHOUT MYELOPATHY: ICD-10-CM

## 2019-12-17 PROCEDURE — 3E0T3TZ INTRODUCTION OF DESTRUCTIVE AGENT INTO PERIPHERAL NERVES AND PLEXI, PERCUTANEOUS APPROACH: ICD-10-PCS | Performed by: ANESTHESIOLOGY

## 2019-12-17 PROCEDURE — 99152 MOD SED SAME PHYS/QHP 5/>YRS: CPT | Performed by: ANESTHESIOLOGY

## 2019-12-17 RX ORDER — METHYLPREDNISOLONE ACETATE 40 MG/ML
INJECTION, SUSPENSION INTRA-ARTICULAR; INTRALESIONAL; INTRAMUSCULAR; SOFT TISSUE AS NEEDED
Status: DISCONTINUED | OUTPATIENT
Start: 2019-12-17 | End: 2019-12-17

## 2019-12-17 RX ORDER — DIPHENHYDRAMINE HYDROCHLORIDE 50 MG/ML
50 INJECTION INTRAMUSCULAR; INTRAVENOUS ONCE AS NEEDED
Status: DISCONTINUED | OUTPATIENT
Start: 2019-12-17 | End: 2019-12-17

## 2019-12-17 RX ORDER — ONDANSETRON 2 MG/ML
4 INJECTION INTRAMUSCULAR; INTRAVENOUS ONCE AS NEEDED
Status: DISCONTINUED | OUTPATIENT
Start: 2019-12-17 | End: 2019-12-17

## 2019-12-17 RX ORDER — LIDOCAINE HYDROCHLORIDE 10 MG/ML
INJECTION, SOLUTION EPIDURAL; INFILTRATION; INTRACAUDAL; PERINEURAL AS NEEDED
Status: DISCONTINUED | OUTPATIENT
Start: 2019-12-17 | End: 2019-12-17

## 2019-12-17 RX ORDER — MIDAZOLAM HYDROCHLORIDE 1 MG/ML
INJECTION INTRAMUSCULAR; INTRAVENOUS AS NEEDED
Status: DISCONTINUED | OUTPATIENT
Start: 2019-12-17 | End: 2019-12-17

## 2019-12-17 RX ORDER — SODIUM CHLORIDE, SODIUM LACTATE, POTASSIUM CHLORIDE, CALCIUM CHLORIDE 600; 310; 30; 20 MG/100ML; MG/100ML; MG/100ML; MG/100ML
100 INJECTION, SOLUTION INTRAVENOUS CONTINUOUS
Status: DISCONTINUED | OUTPATIENT
Start: 2019-12-17 | End: 2019-12-17

## 2019-12-17 NOTE — OPERATIVE REPORT
[facility]  Operative Report  2019     Janeen Grave Minor Patient Status:  Hospital Outpatient Surgery    10/20/1965 MRN [id number]   [facility] ENDOSCOPY Attending No att. providers found   Hosp Day # 0 PCP Joann Mata MD     Indicat 22-gauge, 100-mm SMK needle was advanced into the inferior pole of the right sacroiliac joint. The second needle was placed into the joint but approximately 1 cm cephalad to the first needle.  The subsequent needles were place in this \"leap frog\" fashion

## 2019-12-17 NOTE — H&P
History & Physical Examination    Patient Name: Marisol Pro  MRN: [id number]  [facility]: [id number]  YOB: 1965    Pre-Operative Diagnosis:  [facility] [M46.1]  Lumbosacral spondylosis without myelopathy [M47.817]    Present Illness: A 48 y Taking  Sertraline HCl (ZOLOFT) 100 MG Oral Tab, TAKE 1 TABLET BY MOUTH EVERY MORNING.  (Patient taking differently: Take 50 mg by mouth daily.  ), Disp: 30 tablet, Rfl: 0, Taking  DiphenhydrAMINE HCl (BENADRYL) 25 MG Oral Tab, Take 25 mg by mouth every 6 ( History of depression 1990   • History of eating disorder 1989    Anorexia nervosa   • History of mental disorder 1990   • Hypersomnia, unspecified    • Hypertension    • Itch of skin Childhood    Allergies   • Knee pain, left 4/3/2007   • Lipid screening Ankur Barajas MD at [facility] MAIN OR   • TRANSFORAMINAL LUMBAR EPIDURAL STEROID INJECTION MULTIPLE LEVEL Bilateral 3/1/2018    Performed by Karen Reynaga MD at [facility] MAIN OR   • TRANSFORAMINAL LUMBAR EPIDURAL STEROID INJECTION MULTIPLE LEVEL Right 6/5/2017    Per

## 2019-12-18 RX ORDER — DICLOFENAC SODIUM 75 MG/1
75 TABLET, DELAYED RELEASE ORAL EVERY 12 HOURS PRN
Qty: 60 TABLET | Refills: 2 | Status: SHIPPED | OUTPATIENT
Start: 2019-12-18 | End: 2020-04-06

## 2019-12-18 NOTE — TELEPHONE ENCOUNTER
Medication: Diclofenac Sodium 75 MG Oral Tab EC    Date of last refill: 9/23/19  [place]    Last office visit: 12/3/19  Due back to clinic per last office note: Return for 4-6 weeks post procedure.     Date next [facility]

## 2019-12-18 NOTE — OPERATIVE REPORT
[facility]  Operative Report  2019     Kelvin Dai Minor Patient Status:  Hospital Outpatient Surgery    10/20/1965 MRN [id number]   [facility] ENDOSCOPY Attending No att. providers found   Hosp Day # 0 PCP Abner Cavazos MD     Indicat Subcutaneous lidocaine was instilled into the superficial soft tissues for local anesthesia. Under fluoroscopic guidance, a 22-gauge, 100-mm SMK needle was advanced into the inferior pole of the right sacroiliac joint.  The second needle was placed into the

## 2020-01-17 ENCOUNTER — TELEPHONE (OUTPATIENT)
Dept: SURGERY | Facility: CLINIC | Age: 55
End: 2020-01-17

## 2020-01-17 RX ORDER — METHYLPREDNISOLONE 4 MG/1
TABLET ORAL
Qty: 1 KIT | Refills: 0 | Status: SHIPPED | OUTPATIENT
Start: 2020-01-17 | End: 2020-09-09 | Stop reason: ALTCHOICE

## 2020-01-17 NOTE — TELEPHONE ENCOUNTER
Spoke to pt and confirmed that he does not have any sx of DVT. Pt states he does not have any similar sx. Informed pt this would indicate a clot, he should continue to monitor, and be evaluated.  Advised pt that medrol dose naldo was sent, can check on increa

## 2020-01-17 NOTE — TELEPHONE ENCOUNTER
pt's wife calling, states pt is in a lot of pain. Pain is R side Lumbar down R calf. Sharp pain twice a day. Happening when sitting.  Not a muscle spasm, wants to know what he can do

## 2020-01-17 NOTE — TELEPHONE ENCOUNTER
I would recommend another right transforaminal lumbar epidural steroid injection at L2-3 and L4-5, which gave him excellent relief in the past. If he experiences redness, swelling, warmth, or streaks in the calf, he should go to the ER to be evaluated for

## 2020-01-17 NOTE — TELEPHONE ENCOUNTER
As long as it is ok with his neurologist, he can increase cyclobenzaprine to 10mg every 8 hours if needed. I will also send a Medrol dosepack to the pharmacy. Thanks!

## 2020-01-17 NOTE — TELEPHONE ENCOUNTER
Patient calling with pain to:  Right calf, just one point, not originating in back or buttock. Rating pain: 9/10 when he feels these transient shocks. Causes pt to yell out in pain. States pain is: Sudden, shock of pain, jolt.  Feels like sciatica-like pa

## 2020-03-19 DIAGNOSIS — I10 ESSENTIAL HYPERTENSION: ICD-10-CM

## 2020-03-19 RX ORDER — ATENOLOL 25 MG/1
25 TABLET ORAL 2 TIMES DAILY
Qty: 180 TABLET | Refills: 0 | Status: SHIPPED | OUTPATIENT
Start: 2020-03-19 | End: 2020-06-12

## 2020-04-06 RX ORDER — DICLOFENAC SODIUM 75 MG/1
75 TABLET, DELAYED RELEASE ORAL EVERY 12 HOURS PRN
Qty: 60 TABLET | Refills: 2 | Status: SHIPPED | OUTPATIENT
Start: 2020-04-06 | End: 2020-07-15

## 2020-04-09 ENCOUNTER — TELEPHONE (OUTPATIENT)
Dept: INTERNAL MEDICINE CLINIC | Facility: CLINIC | Age: 55
End: 2020-04-09

## 2020-04-09 NOTE — TELEPHONE ENCOUNTER
Virtual/Telephone Check-In    Audie BECKMAN Minor verbally consents to a Virtual/Telephone Check-In service on 4/13/20. Patient understands and accepts financial responsibility for any deductible, co-insurance and/or co-pays associated with this service.

## 2020-04-09 NOTE — TELEPHONE ENCOUNTER
Virtual/Telephone Check-In    Audie BECKMAN Minor verbally consents to a Virtual/Telephone Check-In service on 4/9/20  Patient understands and accepts financial responsibility for any deductible, co-insurance and/or co-pays associated with this service.     Appt t

## 2020-04-09 NOTE — TELEPHONE ENCOUNTER
Is this apt for today or in the future? The consent date is today and I thought Dr. Asuncion Ward was off. Just clarifying. Thanks!

## 2020-04-13 ENCOUNTER — TELEMEDICINE (OUTPATIENT)
Dept: INTERNAL MEDICINE CLINIC | Facility: CLINIC | Age: 55
End: 2020-04-13

## 2020-04-13 DIAGNOSIS — E78.5 HYPERLIPIDEMIA, UNSPECIFIED HYPERLIPIDEMIA TYPE: ICD-10-CM

## 2020-04-13 DIAGNOSIS — G43.709 CHRONIC MIGRAINE WITHOUT AURA WITHOUT STATUS MIGRAINOSUS, NOT INTRACTABLE: ICD-10-CM

## 2020-04-13 DIAGNOSIS — I10 ESSENTIAL HYPERTENSION: Primary | ICD-10-CM

## 2020-04-13 DIAGNOSIS — K80.20 CALCULUS OF GALLBLADDER WITHOUT CHOLECYSTITIS WITHOUT OBSTRUCTION: ICD-10-CM

## 2020-04-13 DIAGNOSIS — R79.89 ELEVATED LFTS: ICD-10-CM

## 2020-04-13 PROCEDURE — 99214 OFFICE O/P EST MOD 30 MIN: CPT | Performed by: INTERNAL MEDICINE

## 2020-04-13 NOTE — PROGRESS NOTES
[facility]    CHIEF COMPLAINT:  Patient presents with:  Medication Follow-Up        HISTORY OF PRESENT ILLNESS:  Video visit done with pt due to coronavirus pandemic. Htn: Taking meds regularly. No chest pain, no shortness of breath.  Readings a HCl (RITALIN) 5 MG Oral Tab take 2 by mouth twice daily and 1 by mouth each afternoon. (Patient taking differently: take 2 by mouth in the morning  and 2 at noon, and 1 each afternoon. ) 150 tablet 0   • LYRICA 25 MG Oral Cap Take 25 mg by mouth.  Take 1 ta Value Ref Range    Hepatitis A Ab, IgM Nonreactive  Nonreactive             ASSESSMENT AND PLAN:  1. Essential hypertension  Continue meds. Monitor bp at home. 2. Hyperlipidemia, unspecified hyperlipidemia type  Continue statin.    Urged to do labs wh

## 2020-04-17 ENCOUNTER — TELEPHONE (OUTPATIENT)
Dept: SURGERY | Facility: CLINIC | Age: 55
End: 2020-04-17

## 2020-04-17 RX ORDER — BACLOFEN 10 MG/1
10 TABLET ORAL EVERY 8 HOURS PRN
Qty: 60 TABLET | Refills: 0 | Status: SHIPPED | OUTPATIENT
Start: 2020-04-17 | End: 2020-05-07

## 2020-04-17 NOTE — TELEPHONE ENCOUNTER
patient having muscular skeletal pain in lower lumbar upper sacral  no shooting pain just very tight muscle pain.  taking flexeral but not helping. Please advise.

## 2020-04-17 NOTE — TELEPHONE ENCOUNTER
Baclofen sent to pharmacy instead of cyclobenzaprine. Patient notified by [address] [place] [address] [address].    Thanks

## 2020-05-07 RX ORDER — BACLOFEN 10 MG/1
10 TABLET ORAL EVERY 8 HOURS PRN
Qty: 90 TABLET | Refills: 0 | Status: SHIPPED | OUTPATIENT
Start: 2020-05-07 | End: 2020-06-09

## 2020-06-09 RX ORDER — BACLOFEN 10 MG/1
10 TABLET ORAL EVERY 8 HOURS PRN
Qty: 90 TABLET | Refills: 0 | Status: SHIPPED | OUTPATIENT
Start: 2020-06-09 | End: 2020-07-13

## 2020-06-09 NOTE — TELEPHONE ENCOUNTER
Medication: baclofen 10 MG Oral Tab     Date of last refill: 05/07/2020  [place]    Last office visit: 12/03/2019  Due back to clinic per last office note:  Return for 4-6 weeks post procedure  Date next office visit

## 2020-06-12 DIAGNOSIS — E78.5 HYPERLIPIDEMIA, UNSPECIFIED HYPERLIPIDEMIA TYPE: Primary | ICD-10-CM

## 2020-06-12 DIAGNOSIS — I10 ESSENTIAL HYPERTENSION: ICD-10-CM

## 2020-06-12 RX ORDER — ATENOLOL 25 MG/1
TABLET ORAL
Qty: 180 TABLET | Refills: 0 | Status: SHIPPED | OUTPATIENT
Start: 2020-06-12 | End: 2020-09-11

## 2020-06-12 NOTE — TELEPHONE ENCOUNTER
The patient is due for an apt for med check around 7/13. Please also order a CMP and lipid panel to be done prior to that apt. Thanks.

## 2020-07-13 RX ORDER — BACLOFEN 10 MG/1
10 TABLET ORAL EVERY 8 HOURS PRN
Qty: 90 TABLET | Refills: 0 | Status: SHIPPED | OUTPATIENT
Start: 2020-07-13 | End: 2020-08-10

## 2020-07-15 RX ORDER — DICLOFENAC SODIUM 75 MG/1
75 TABLET, DELAYED RELEASE ORAL EVERY 12 HOURS PRN
Qty: 60 TABLET | Refills: 2 | Status: SHIPPED | OUTPATIENT
Start: 2020-07-15 | End: 2020-10-13

## 2020-08-07 ENCOUNTER — LAB ENCOUNTER (OUTPATIENT)
Dept: LAB | Age: 55
End: 2020-08-07
Attending: INTERNAL MEDICINE
Payer: COMMERCIAL

## 2020-08-07 DIAGNOSIS — I10 ESSENTIAL HYPERTENSION: ICD-10-CM

## 2020-08-07 DIAGNOSIS — R79.89 ABNORMAL LIVER FUNCTION TESTS: ICD-10-CM

## 2020-08-07 DIAGNOSIS — E78.5 HYPERLIPIDEMIA, UNSPECIFIED HYPERLIPIDEMIA TYPE: ICD-10-CM

## 2020-08-07 LAB
ALBUMIN SERPL-MCNC: 4.2 G/DL (ref 3.4–5)
ALBUMIN/GLOB SERPL: 1.3 {RATIO} (ref 1–2)
ALP LIVER SERPL-CCNC: 94 U/L (ref 45–117)
ALT SERPL-CCNC: 47 U/L (ref 16–61)
ANION GAP SERPL CALC-SCNC: 2 MMOL/L (ref 0–18)
AST SERPL-CCNC: 29 U/L (ref 15–37)
BILIRUB DIRECT SERPL-MCNC: 0.2 MG/DL (ref 0–0.2)
BILIRUB SERPL-MCNC: 0.6 MG/DL (ref 0.1–2)
BUN BLD-MCNC: 15 MG/DL (ref 7–18)
BUN/CREAT SERPL: 13.3 (ref 10–20)
CALCIUM BLD-MCNC: 9.1 MG/DL (ref 8.5–10.1)
CHLORIDE SERPL-SCNC: 108 MMOL/L (ref 98–112)
CHOLEST SMN-MCNC: 156 MG/DL (ref ?–200)
CO2 SERPL-SCNC: 30 MMOL/L (ref 21–32)
CREAT BLD-MCNC: 1.13 MG/DL (ref 0.7–1.3)
GLOBULIN PLAS-MCNC: 3.3 G/DL (ref 2.8–4.4)
GLUCOSE BLD-MCNC: 96 MG/DL (ref 70–99)
HDLC SERPL-MCNC: 36 MG/DL (ref 40–59)
LDLC SERPL CALC-MCNC: 96 MG/DL (ref ?–100)
M PROTEIN MFR SERPL ELPH: 7.5 G/DL (ref 6.4–8.2)
NONHDLC SERPL-MCNC: 120 MG/DL (ref ?–130)
OSMOLALITY SERPL CALC.SUM OF ELEC: 291 MOSM/KG (ref 275–295)
PATIENT FASTING Y/N/NP: YES
PATIENT FASTING Y/N/NP: YES
POTASSIUM SERPL-SCNC: 4.2 MMOL/L (ref 3.5–5.1)
SODIUM SERPL-SCNC: 140 MMOL/L (ref 136–145)
TRIGL SERPL-MCNC: 119 MG/DL (ref 30–149)
VLDLC SERPL CALC-MCNC: 24 MG/DL (ref 0–30)

## 2020-08-07 PROCEDURE — 36415 COLL VENOUS BLD VENIPUNCTURE: CPT | Performed by: INTERNAL MEDICINE

## 2020-08-07 PROCEDURE — 80053 COMPREHEN METABOLIC PANEL: CPT | Performed by: INTERNAL MEDICINE

## 2020-08-10 ENCOUNTER — VIRTUAL PHONE E/M (OUTPATIENT)
Dept: INTERNAL MEDICINE CLINIC | Facility: CLINIC | Age: 55
End: 2020-08-10
Payer: COMMERCIAL

## 2020-08-10 DIAGNOSIS — E78.5 HYPERLIPIDEMIA, UNSPECIFIED HYPERLIPIDEMIA TYPE: ICD-10-CM

## 2020-08-10 DIAGNOSIS — K76.0 FATTY LIVER: ICD-10-CM

## 2020-08-10 DIAGNOSIS — I10 ESSENTIAL HYPERTENSION: Primary | ICD-10-CM

## 2020-08-10 DIAGNOSIS — G43.709 CHRONIC MIGRAINE WITHOUT AURA WITHOUT STATUS MIGRAINOSUS, NOT INTRACTABLE: ICD-10-CM

## 2020-08-10 PROCEDURE — 99214 OFFICE O/P EST MOD 30 MIN: CPT | Performed by: INTERNAL MEDICINE

## 2020-08-10 RX ORDER — BACLOFEN 10 MG/1
10 TABLET ORAL EVERY 8 HOURS PRN
Qty: 90 TABLET | Refills: 0 | Status: SHIPPED | OUTPATIENT
Start: 2020-08-10 | End: 2020-09-09

## 2020-08-10 NOTE — PROGRESS NOTES
Virtual Telephone Check-In    Lori Trinidad Minor verbally consents to a Virtual/Telephone Check-In visit on 08/10/20. Patient has been referred to the [facility] website at [email or web address] to review the yearly Consent to Treat document.     Patient understand Inject 140 mg into the skin one time. Inject once monthly      • Metoclopramide HCl (REGLAN) 5 MG Oral Tab Take 5 mg by mouth. Take 1 when experiencing migraine induced diarrhea.    Take along with Benadryl     • Loperamide HCl 2 MG Oral Cap Take 2 mg by mo 18 mg/dL    Creatinine 1.13 0.70 - 1.30 mg/dL    BUN/CREA Ratio 13.3 10.0 - 20.0    Calcium, Total 9.1 8.5 - 10.1 mg/dL    Calculated Osmolality 291 275 - 295 mOsm/kg    GFR, Non- 74 >=60    GFR, -American 86 >=60    AST 29 15 - 37 U billing was spent on reviewing labs, medications, radiology tests and decision making. Appropriate medical decision-making and tests are ordered as detailed in the plan of care above.      Return in about 1 month (around 9/10/2020) for bp check, radha

## 2020-09-08 NOTE — TELEPHONE ENCOUNTER
Medication: BACLOFEN 10 MG Oral Tab    Date of last refill: 8/10/2020  [place]    Last office visit: 12/03/2019  Due back to clinic per last office note:  Return for 4-6 weeks post procedure  Date next office visit s

## 2020-09-09 ENCOUNTER — OFFICE VISIT (OUTPATIENT)
Dept: INTERNAL MEDICINE CLINIC | Facility: CLINIC | Age: 55
End: 2020-09-09
Payer: COMMERCIAL

## 2020-09-09 VITALS
RESPIRATION RATE: 12 BRPM | WEIGHT: 300 LBS | TEMPERATURE: 98 F | DIASTOLIC BLOOD PRESSURE: 88 MMHG | HEIGHT: 70 IN | HEART RATE: 64 BPM | BODY MASS INDEX: 42.95 KG/M2 | SYSTOLIC BLOOD PRESSURE: 148 MMHG

## 2020-09-09 DIAGNOSIS — I10 ESSENTIAL HYPERTENSION: Primary | ICD-10-CM

## 2020-09-09 DIAGNOSIS — Z23 NEED FOR VACCINATION: ICD-10-CM

## 2020-09-09 DIAGNOSIS — F32.A DEPRESSION, UNSPECIFIED DEPRESSION TYPE: ICD-10-CM

## 2020-09-09 DIAGNOSIS — R45.89 AT LOW RISK FOR SUICIDE: ICD-10-CM

## 2020-09-09 PROCEDURE — 3008F BODY MASS INDEX DOCD: CPT | Performed by: INTERNAL MEDICINE

## 2020-09-09 PROCEDURE — 3079F DIAST BP 80-89 MM HG: CPT | Performed by: INTERNAL MEDICINE

## 2020-09-09 PROCEDURE — 90471 IMMUNIZATION ADMIN: CPT | Performed by: INTERNAL MEDICINE

## 2020-09-09 PROCEDURE — 3077F SYST BP >= 140 MM HG: CPT | Performed by: INTERNAL MEDICINE

## 2020-09-09 PROCEDURE — 90686 IIV4 VACC NO PRSV 0.5 ML IM: CPT | Performed by: INTERNAL MEDICINE

## 2020-09-09 PROCEDURE — 99214 OFFICE O/P EST MOD 30 MIN: CPT | Performed by: INTERNAL MEDICINE

## 2020-09-09 PROCEDURE — 90472 IMMUNIZATION ADMIN EACH ADD: CPT | Performed by: INTERNAL MEDICINE

## 2020-09-09 PROCEDURE — 90750 HZV VACC RECOMBINANT IM: CPT | Performed by: INTERNAL MEDICINE

## 2020-09-09 RX ORDER — BACLOFEN 10 MG/1
10 TABLET ORAL EVERY 8 HOURS PRN
Qty: 90 TABLET | Refills: 0 | Status: SHIPPED | OUTPATIENT
Start: 2020-09-09 | End: 2020-10-13

## 2020-09-09 RX ORDER — LOSARTAN POTASSIUM 25 MG/1
25 TABLET ORAL DAILY
Qty: 30 TABLET | Refills: 1 | Status: SHIPPED | OUTPATIENT
Start: 2020-09-09 | End: 2020-11-14

## 2020-09-09 NOTE — PROGRESS NOTES
[facility]    CHIEF COMPLAINT:  Patient presents with:  Blood Pressure        HISTORY OF PRESENT ILLNESS:  Here for bp and med check. Htn: Taking meds regularly. No chest pain, no shortness of breath. Elevated today.  Has been elevated at home a ClonazePAM 0.5 MG Oral Tab TAKE 1 TABLET BY MOUTH FIVE TIMES DAILY 150 tablet 0   • Methylphenidate HCl (RITALIN) 5 MG Oral Tab take 2 by mouth twice daily and 1 by mouth each afternoon.  (Patient taking differently: take 2 by mouth in the morning  and 2 at - 295 mOsm/kg    GFR, Non- 74 >=60    GFR, -American 86 >=60    AST 29 15 - 37 U/L    ALT 47 16 - 61 U/L    Alkaline Phosphatase 94 45 - 117 U/L    Bilirubin, Total 0.6 0.1 - 2.0 mg/dL    Total Protein 7.5 6.4 - 8.2 g/dL    Albumin 4

## 2020-09-10 DIAGNOSIS — I10 ESSENTIAL HYPERTENSION: ICD-10-CM

## 2020-09-11 RX ORDER — ATENOLOL 25 MG/1
TABLET ORAL
Qty: 180 TABLET | Refills: 0 | Status: SHIPPED | OUTPATIENT
Start: 2020-09-11 | End: 2020-11-30

## 2020-10-10 DIAGNOSIS — I10 ESSENTIAL HYPERTENSION: ICD-10-CM

## 2020-10-12 RX ORDER — LOSARTAN POTASSIUM 25 MG/1
TABLET ORAL
Qty: 30 TABLET | Refills: 1 | OUTPATIENT
Start: 2020-10-12

## 2020-10-12 NOTE — TELEPHONE ENCOUNTER
Medication: DICLOFENAC SODIUM 75 MG Oral Tab EC    Date of last refill: 7/15/2020  [place]       Last office visit: 12/03/2019  Due back to clinic per last office note:  Return for 4-6 weeks post procedure  Date next

## 2020-10-12 NOTE — TELEPHONE ENCOUNTER
Medication: BACLOFEN 10 MG Oral Tab     Date of last refill: 9/9/2020  [place]     Last office visit: 12/03/2019  Due back to clinic per last office note:  Return for 4-6 weeks post procedure  Date next office visit

## 2020-10-13 RX ORDER — BACLOFEN 10 MG/1
10 TABLET ORAL EVERY 8 HOURS PRN
Qty: 90 TABLET | Refills: 0 | Status: SHIPPED | OUTPATIENT
Start: 2020-10-13 | End: 2020-11-06

## 2020-10-13 RX ORDER — DICLOFENAC SODIUM 75 MG/1
75 TABLET, DELAYED RELEASE ORAL EVERY 12 HOURS PRN
Qty: 60 TABLET | Refills: 2 | Status: SHIPPED | OUTPATIENT
Start: 2020-10-13 | End: 2021-01-14

## 2020-11-06 RX ORDER — BACLOFEN 10 MG/1
10 TABLET ORAL EVERY 8 HOURS PRN
Qty: 90 TABLET | Refills: 0 | Status: SHIPPED | OUTPATIENT
Start: 2020-11-06 | End: 2020-12-08

## 2020-11-14 ENCOUNTER — TELEPHONE (OUTPATIENT)
Dept: INTERNAL MEDICINE CLINIC | Facility: CLINIC | Age: 55
End: 2020-11-14

## 2020-11-14 DIAGNOSIS — I10 ESSENTIAL HYPERTENSION: ICD-10-CM

## 2020-11-14 RX ORDER — LOSARTAN POTASSIUM 25 MG/1
TABLET ORAL
Qty: 30 TABLET | Refills: 0 | Status: SHIPPED | OUTPATIENT
Start: 2020-11-14 | End: 2020-12-01

## 2020-11-15 NOTE — TELEPHONE ENCOUNTER
Passes protocol. 30 day supply filled. PSR - Please schedule pt for overdue 1 month BP check. Thanks!

## 2020-11-29 DIAGNOSIS — I10 ESSENTIAL HYPERTENSION: ICD-10-CM

## 2020-11-30 RX ORDER — ATENOLOL 25 MG/1
TABLET ORAL
Qty: 60 TABLET | Refills: 0 | Status: SHIPPED | OUTPATIENT
Start: 2020-11-30 | End: 2020-12-27

## 2020-12-01 ENCOUNTER — OFFICE VISIT (OUTPATIENT)
Dept: INTERNAL MEDICINE CLINIC | Facility: CLINIC | Age: 55
End: 2020-12-01
Payer: COMMERCIAL

## 2020-12-01 VITALS
WEIGHT: 289 LBS | BODY MASS INDEX: 41.37 KG/M2 | HEART RATE: 60 BPM | HEIGHT: 70 IN | DIASTOLIC BLOOD PRESSURE: 72 MMHG | SYSTOLIC BLOOD PRESSURE: 120 MMHG | RESPIRATION RATE: 12 BRPM | TEMPERATURE: 98 F

## 2020-12-01 DIAGNOSIS — Z00.00 PHYSICAL EXAM, ANNUAL: ICD-10-CM

## 2020-12-01 DIAGNOSIS — E78.5 HYPERLIPIDEMIA, UNSPECIFIED HYPERLIPIDEMIA TYPE: ICD-10-CM

## 2020-12-01 DIAGNOSIS — I10 ESSENTIAL HYPERTENSION: Primary | ICD-10-CM

## 2020-12-01 DIAGNOSIS — Z23 NEED FOR VACCINATION: ICD-10-CM

## 2020-12-01 DIAGNOSIS — N62 GYNECOMASTIA: ICD-10-CM

## 2020-12-01 PROCEDURE — 99214 OFFICE O/P EST MOD 30 MIN: CPT | Performed by: INTERNAL MEDICINE

## 2020-12-01 PROCEDURE — 3008F BODY MASS INDEX DOCD: CPT | Performed by: INTERNAL MEDICINE

## 2020-12-01 PROCEDURE — 3074F SYST BP LT 130 MM HG: CPT | Performed by: INTERNAL MEDICINE

## 2020-12-01 PROCEDURE — 90471 IMMUNIZATION ADMIN: CPT | Performed by: INTERNAL MEDICINE

## 2020-12-01 PROCEDURE — 3078F DIAST BP <80 MM HG: CPT | Performed by: INTERNAL MEDICINE

## 2020-12-01 PROCEDURE — 90750 HZV VACC RECOMBINANT IM: CPT | Performed by: INTERNAL MEDICINE

## 2020-12-01 RX ORDER — LOSARTAN POTASSIUM 25 MG/1
25 TABLET ORAL DAILY
Qty: 90 TABLET | Refills: 0 | Status: SHIPPED | OUTPATIENT
Start: 2020-12-01 | End: 2021-03-12

## 2020-12-01 NOTE — PROGRESS NOTES
[facility]    CHIEF COMPLAINT:  Patient presents with:  Blood Pressure  Immunization/Injection: requesting 2nd shingrix. HISTORY OF PRESENT ILLNESS:  Here for follow up. Htn: Taking meds regularly. No chest pain, no shortness of breath. (RITALIN) 5 MG Oral Tab take 2 by mouth twice daily and 1 by mouth each afternoon. (Patient taking differently: take 2 by mouth in the morning  and 2 at noon, and 1 each afternoon. ) 150 tablet 0   • LYRICA 25 MG Oral Cap Take 25 mg by mouth.  Take 1 tablet 61 U/L    Alkaline Phosphatase 94 45 - 117 U/L    Bilirubin, Total 0.6 0.1 - 2.0 mg/dL    Total Protein 7.5 6.4 - 8.2 g/dL    Albumin 4.2 3.4 - 5.0 g/dL    Globulin  3.3 2.8 - 4.4 g/dL    A/G Ratio 1.3 1.0 - 2.0    FASTING Yes    BILIRUBIN, DIRECT   Result

## 2020-12-07 DIAGNOSIS — E78.00 HIGH CHOLESTEROL: ICD-10-CM

## 2020-12-08 DIAGNOSIS — M47.816 FACET ARTHROPATHY, LUMBAR: ICD-10-CM

## 2020-12-08 DIAGNOSIS — M46.1 SACROILIITIS (HCC): Primary | ICD-10-CM

## 2020-12-08 RX ORDER — BACLOFEN 10 MG/1
10 TABLET ORAL EVERY 8 HOURS PRN
Qty: 90 TABLET | Refills: 0 | Status: SHIPPED | OUTPATIENT
Start: 2020-12-08 | End: 2021-01-04

## 2020-12-08 NOTE — TELEPHONE ENCOUNTER
Medication: BACLOFEN 10 MG Oral Tab     Date of last refill: 11/6/2020    Last office visit: 12/3/2019  Due back to clinic per last office note:  406 wks post procedure  Date next office visit scheduled:  None      Last OV note recommendation:   ASSESSMENT

## 2020-12-21 ENCOUNTER — TELEMEDICINE (OUTPATIENT)
Dept: INTERNAL MEDICINE CLINIC | Facility: CLINIC | Age: 55
End: 2020-12-21
Payer: COMMERCIAL

## 2020-12-21 DIAGNOSIS — J02.9 SORE THROAT: Primary | ICD-10-CM

## 2020-12-21 DIAGNOSIS — Z20.822 EXPOSURE TO 2019 NOVEL CORONAVIRUS: ICD-10-CM

## 2020-12-21 PROCEDURE — 99213 OFFICE O/P EST LOW 20 MIN: CPT | Performed by: INTERNAL MEDICINE

## 2020-12-21 NOTE — PROGRESS NOTES
Doximity Video Visit Consent    Shakila Lillianaallan Minor verbally consents to a Doximity Video Visit Check-In service on 12/21/2020. Patient understands that we are using a different platform that may not be as secure as our traditional telehealth platform.  Patient w induced diarrhea. Take along with Benadryl     • Loperamide HCl 2 MG Oral Cap Take 2 mg by mouth as needed for Diarrhea.      • Ondansetron HCl (ZOFRAN) 4 mg tablet TK 1 T PO Q 8 H  5   • Zolmitriptan 5 MG Oral Tablet Dispersible TK 1 T PO PRF HA  5   • C Osmolality 291 275 - 295 mOsm/kg    GFR, Non- 74 >=60    GFR, -American 86 >=60    AST 29 15 - 37 U/L    ALT 47 16 - 61 U/L    Alkaline Phosphatase 94 45 - 117 U/L    Bilirubin, Total 0.6 0.1 - 2.0 mg/dL    Total Protein 7.5 6.4 - 8. file.      Pretty Domingo MD

## 2020-12-24 ENCOUNTER — LAB ENCOUNTER (OUTPATIENT)
Dept: LAB | Age: 55
End: 2020-12-24
Attending: INTERNAL MEDICINE
Payer: COMMERCIAL

## 2020-12-24 DIAGNOSIS — N62 GYNECOMASTIA: ICD-10-CM

## 2020-12-24 PROCEDURE — 83002 ASSAY OF GONADOTROPIN (LH): CPT

## 2020-12-24 PROCEDURE — 36415 COLL VENOUS BLD VENIPUNCTURE: CPT

## 2020-12-24 PROCEDURE — 83001 ASSAY OF GONADOTROPIN (FSH): CPT

## 2020-12-26 ENCOUNTER — LAB ENCOUNTER (OUTPATIENT)
Dept: LAB | Age: 55
End: 2020-12-26
Attending: INTERNAL MEDICINE
Payer: COMMERCIAL

## 2020-12-26 DIAGNOSIS — Z00.00 PHYSICAL EXAM, ANNUAL: ICD-10-CM

## 2020-12-26 DIAGNOSIS — N62 GYNECOMASTIA: ICD-10-CM

## 2020-12-26 PROCEDURE — 84443 ASSAY THYROID STIM HORMONE: CPT

## 2020-12-26 PROCEDURE — 85025 COMPLETE CBC W/AUTO DIFF WBC: CPT

## 2020-12-26 PROCEDURE — 83036 HEMOGLOBIN GLYCOSYLATED A1C: CPT

## 2020-12-26 PROCEDURE — 84402 ASSAY OF FREE TESTOSTERONE: CPT

## 2020-12-26 PROCEDURE — 80053 COMPREHEN METABOLIC PANEL: CPT

## 2020-12-26 PROCEDURE — 80061 LIPID PANEL: CPT

## 2020-12-26 PROCEDURE — 84403 ASSAY OF TOTAL TESTOSTERONE: CPT

## 2020-12-26 PROCEDURE — 36415 COLL VENOUS BLD VENIPUNCTURE: CPT

## 2020-12-27 DIAGNOSIS — I10 ESSENTIAL HYPERTENSION: ICD-10-CM

## 2020-12-27 RX ORDER — ATENOLOL 25 MG/1
TABLET ORAL
Qty: 180 TABLET | Refills: 0 | Status: SHIPPED | OUTPATIENT
Start: 2020-12-27 | End: 2021-03-30

## 2020-12-27 NOTE — TELEPHONE ENCOUNTER
Last OV relevant to medication: 12/1/20  Last refill date: 11/30/20     #/refills: 60/0  When pt was asked to return for OV: 3 months  Upcoming appt/reason: none  Lab Results   Component Value Date    GLU 90 12/26/2020    BUN 22 (H) 12/26/2020    BUNCREA 2

## 2020-12-29 ENCOUNTER — TELEPHONE (OUTPATIENT)
Dept: INTERNAL MEDICINE CLINIC | Facility: CLINIC | Age: 55
End: 2020-12-29

## 2020-12-29 NOTE — TELEPHONE ENCOUNTER
Spoke to [employer] from [employer]. States FREE & TOTAL TESTOSTERONE is a \"send out\". Gautam unable to explain why it was sent out. States [facility] received it yesterday. States results are reported in 24 hours & should be resulted tomorrow.     Left detai

## 2020-12-29 NOTE — TELEPHONE ENCOUNTER
Pt wife calling (on HIPPA) pt has appt with cancer center tomorrow and pt got all his lab results back besides his testosterone lab.  Pt and wife are scared that his testosterone is low and the lab results for testosterone would be a big discussion for the

## 2020-12-30 ENCOUNTER — GENETICS ENCOUNTER (OUTPATIENT)
Dept: GENETICS | Facility: HOSPITAL | Age: 55
End: 2020-12-30
Payer: COMMERCIAL

## 2020-12-30 ENCOUNTER — TELEPHONE (OUTPATIENT)
Dept: INTERNAL MEDICINE CLINIC | Facility: CLINIC | Age: 55
End: 2020-12-30

## 2020-12-30 DIAGNOSIS — N62 GYNECOMASTIA: Primary | ICD-10-CM

## 2020-12-30 PROCEDURE — 96040 HC GENETIC COUNSELING EA 30 MIN: CPT

## 2020-12-30 NOTE — PROGRESS NOTES
Patient Name: Lindsey Szymanski  YOB: 1965  Date of Visit: 12/30/2020    Reason for visit: Mr. Michael Bond was seen for the purposes of genetic counseling due to a clinical suspicion of Klinefelter syndrome. He was accompanied to the visit by his wife. another maternal relative  due to cystic fibrosis. His maternal grandfather  at 76 due to lung cancer with a history of Alzheimer's disease. Mr. Kirti Parker father  at 64 due to metastatic bladder cancer.  Mr. Szymanski has one full paternal aunt (58 with 52, XXY ([place]) are sometimes initially identified because of an abnormality in the location of the urinary opening in the penis (hypospadias), small penis or testes, or developmental delay (e.g. speech delay).  Older children and teenagers are sometimes analysis on a blood sample or by a chromosomal microarray (CMA) test. CMA consists of an oral cheek (buccal) swab and is an easy and painless way to detect abnormalities of chromosome numbers and provide a definitive diagnosis.  52, XXY ([place]) can also be nhung some point during their lifetime is higher than average.  When a personal and/or family history doesn't clearly fit a single hereditary cancer syndrome, panel genetic testing examining multiple genes in which pathogenic mutations cause hereditary cancer syn their lifetime would return to those expected for individuals in the general population.   It should be emphasized that absence of a mutation in an at-risk individual would not eliminate their risk for cancer, but simply return them to the risk expected for Manuela. 2. The Genetics office will call  Mr. Szymanski when results are available. 3. Recommendations for Mr. Szymanski and family members will depend the above genetic testing results.       Send to: Lyndsey Nicholson MD  Time spent with patient: 90 minutes

## 2020-12-30 NOTE — TELEPHONE ENCOUNTER
Pt wife(per tereso) called and wanted to see if it might be possible to have a mammogram ordered for them. Today if possible as they are doing other exams as well. Please advise, thank you.

## 2020-12-30 NOTE — TELEPHONE ENCOUNTER
He should see a breast specialist for the gynecomastia and mammogram will be ordered by them if appropriate. He can see dr. Jorge Valiente.    Please do referral.

## 2020-12-30 NOTE — TELEPHONE ENCOUNTER
See note below. Okay to order mammogram?  Noted hx of gynecomastia. Testosterone levels still pending.

## 2020-12-31 DIAGNOSIS — E78.00 HIGH CHOLESTEROL: ICD-10-CM

## 2020-12-31 RX ORDER — SIMVASTATIN 20 MG
20 TABLET ORAL NIGHTLY
Qty: 30 TABLET | Refills: 0 | Status: SHIPPED | OUTPATIENT
Start: 2020-12-31 | End: 2021-01-05

## 2021-01-02 DIAGNOSIS — M47.816 FACET ARTHROPATHY, LUMBAR: ICD-10-CM

## 2021-01-02 DIAGNOSIS — M46.1 SACROILIITIS (HCC): ICD-10-CM

## 2021-01-04 RX ORDER — BACLOFEN 10 MG/1
10 TABLET ORAL EVERY 8 HOURS PRN
Qty: 90 TABLET | Refills: 0 | Status: SHIPPED | OUTPATIENT
Start: 2021-01-04 | End: 2021-02-01

## 2021-01-04 NOTE — TELEPHONE ENCOUNTER
Medication: baclofen 10 MG Oral Tab    Date of last refill: 12/08/2020  [place]    Last office visit: 12/03/2019  Due back to clinic per last office note:  N/A  Date next office visit scheduled:  N/A    Last URINE Sc

## 2021-01-05 ENCOUNTER — OFFICE VISIT (OUTPATIENT)
Dept: SURGERY | Facility: CLINIC | Age: 56
End: 2021-01-05
Payer: COMMERCIAL

## 2021-01-05 VITALS
HEIGHT: 70 IN | WEIGHT: 282 LBS | HEART RATE: 58 BPM | DIASTOLIC BLOOD PRESSURE: 82 MMHG | RESPIRATION RATE: 16 BRPM | BODY MASS INDEX: 40.37 KG/M2 | SYSTOLIC BLOOD PRESSURE: 131 MMHG

## 2021-01-05 DIAGNOSIS — N63.0 BREAST SWELLING: Primary | ICD-10-CM

## 2021-01-05 DIAGNOSIS — E78.00 HIGH CHOLESTEROL: ICD-10-CM

## 2021-01-05 DIAGNOSIS — K76.0 NAFLD (NONALCOHOLIC FATTY LIVER DISEASE): Primary | ICD-10-CM

## 2021-01-05 PROCEDURE — 3079F DIAST BP 80-89 MM HG: CPT | Performed by: SURGERY

## 2021-01-05 PROCEDURE — 3008F BODY MASS INDEX DOCD: CPT | Performed by: SURGERY

## 2021-01-05 PROCEDURE — 3075F SYST BP GE 130 - 139MM HG: CPT | Performed by: SURGERY

## 2021-01-05 PROCEDURE — 99244 OFF/OP CNSLTJ NEW/EST MOD 40: CPT | Performed by: SURGERY

## 2021-01-05 RX ORDER — SIMVASTATIN 20 MG
20 TABLET ORAL NIGHTLY
Qty: 90 TABLET | Refills: 3 | Status: SHIPPED | OUTPATIENT
Start: 2021-01-05

## 2021-01-05 NOTE — TELEPHONE ENCOUNTER
Spoke with pt to inform he needs to schedule OV before next refill is processed. Pt stated they would call back at a later time when he is more aware of his schedule.

## 2021-01-11 ENCOUNTER — TELEPHONE (OUTPATIENT)
Dept: INTERNAL MEDICINE CLINIC | Facility: CLINIC | Age: 56
End: 2021-01-11

## 2021-01-11 ENCOUNTER — GENETICS ENCOUNTER (OUTPATIENT)
Dept: HEMATOLOGY/ONCOLOGY | Facility: HOSPITAL | Age: 56
End: 2021-01-11

## 2021-01-11 ENCOUNTER — TELEPHONE (OUTPATIENT)
Dept: PAIN CLINIC | Facility: CLINIC | Age: 56
End: 2021-01-11

## 2021-01-11 DIAGNOSIS — N63.0 BREAST MASS: Primary | ICD-10-CM

## 2021-01-11 DIAGNOSIS — N64.4 BREAST PAIN IN MALE: ICD-10-CM

## 2021-01-11 NOTE — TELEPHONE ENCOUNTER
Genetics encounter today, in epic. Pt received results of recent testing.  See request below for referral to endo and urology specialists

## 2021-01-11 NOTE — TELEPHONE ENCOUNTER
Patient received the results of his genetics testing. He would like recommendations for an Urologist and an Endocrinologist who are aware of intersex problems and hormonal issues in men.   He would like to see an Endocrinologist who could determine if he h

## 2021-01-11 NOTE — TELEPHONE ENCOUNTER
Pt wanted to let Dr. Angy Villeda know that his wife will be calling later to make an appt. Pt also wanted to let Dr. Angy Villeda know that some \"complicating medical factors\" have occurred recently that he would like Dr. Angy Villeda to know about.  Pt thinks that these new fa

## 2021-01-11 NOTE — TELEPHONE ENCOUNTER
RN reviewed chart and did not identify any recent diagnoses that would complicate pain management plan of care. When pt's wife calls back, please notify her that all concerns can be discussed during OV.  Dr. Marilyn Salinas is unable to review all notes in pt ynes

## 2021-01-11 NOTE — PROGRESS NOTES
Patient Name: Prince Szymanski  YOB: 1965    Referring Provider:  Bethany Newman MD        Reason for Referral:  Minor had genetic testing performed on 12/30/2020 because of a clinical suspicion of Klinefelter syndrome and a family history of breas Mr. Levi Clear relatives' cancers remains unexplained.      Management and surveillance for  . Stephon and other family members should be based on their personal and family history, with screening for cancers beginning 10 years younger than the earliest age at

## 2021-01-12 ENCOUNTER — OFFICE VISIT (OUTPATIENT)
Dept: ENDOCRINOLOGY CLINIC | Facility: CLINIC | Age: 56
End: 2021-01-12
Payer: COMMERCIAL

## 2021-01-12 ENCOUNTER — LAB ENCOUNTER (OUTPATIENT)
Dept: LAB | Facility: HOSPITAL | Age: 56
End: 2021-01-12
Attending: INTERNAL MEDICINE
Payer: COMMERCIAL

## 2021-01-12 VITALS
SYSTOLIC BLOOD PRESSURE: 130 MMHG | RESPIRATION RATE: 16 BRPM | DIASTOLIC BLOOD PRESSURE: 76 MMHG | HEART RATE: 68 BPM | BODY MASS INDEX: 41.37 KG/M2 | WEIGHT: 289 LBS | HEIGHT: 70 IN

## 2021-01-12 DIAGNOSIS — N62 GYNECOMASTIA: ICD-10-CM

## 2021-01-12 DIAGNOSIS — N62 GYNECOMASTIA: Primary | ICD-10-CM

## 2021-01-12 LAB
ESTRADIOL SERPL-MCNC: 42.6 PG/ML
PROLACTIN SERPL-MCNC: 3.4 NG/ML

## 2021-01-12 PROCEDURE — 3075F SYST BP GE 130 - 139MM HG: CPT | Performed by: INTERNAL MEDICINE

## 2021-01-12 PROCEDURE — 3008F BODY MASS INDEX DOCD: CPT | Performed by: INTERNAL MEDICINE

## 2021-01-12 PROCEDURE — 36415 COLL VENOUS BLD VENIPUNCTURE: CPT

## 2021-01-12 PROCEDURE — 84146 ASSAY OF PROLACTIN: CPT

## 2021-01-12 PROCEDURE — 3078F DIAST BP <80 MM HG: CPT | Performed by: INTERNAL MEDICINE

## 2021-01-12 PROCEDURE — 84704 HCG FREE BETACHAIN TEST: CPT

## 2021-01-12 PROCEDURE — 99243 OFF/OP CNSLTJ NEW/EST LOW 30: CPT | Performed by: INTERNAL MEDICINE

## 2021-01-12 PROCEDURE — 82670 ASSAY OF TOTAL ESTRADIOL: CPT

## 2021-01-12 NOTE — TELEPHONE ENCOUNTER
I don't know anyone in particular who would deal with those specific issues. He can check with his insurance and will have to view profiles of various specialists to see who may have a special interest in his areas of concern.  He has ppo so he really does
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
## 2021-01-12 NOTE — TELEPHONE ENCOUNTER
Spoke with pt, advised of Dr Mare Capone notes below. Pt stated will go with Dr Alfie Souza recommendations at this time.  Sent contact info below to [employer] per pt request.    Emilee Salazar/Tomas Phone: [phone]    Dr Corby West:  [phone]  [facility]

## 2021-01-13 ENCOUNTER — TELEPHONE (OUTPATIENT)
Dept: SURGERY | Facility: CLINIC | Age: 56
End: 2021-01-13

## 2021-01-13 NOTE — TELEPHONE ENCOUNTER
Returned patient's question asking it the condition that he his seeing Dr Katarzyna Carrion about is related to his liver condition. Asked patient to return call to provide more detail and to discuss.     RAMON Camarillo  Nurse Practitioner, Hepatology  [phone]

## 2021-01-13 NOTE — TELEPHONE ENCOUNTER
Attempted to call pt back regarding his plan with Dr. Rosina Foley. No answer. LVM after verifying verbal release. Informed pt that per Dr. Malia Deng light of normal genetics, please just have him get B Dx pino which I ordered\" .   Provided central scheduling

## 2021-01-14 DIAGNOSIS — M46.1 SACROILIAC INFLAMMATION (HCC): Primary | ICD-10-CM

## 2021-01-14 LAB — BETA HCG QUANT (TUMOR MARKER): <1 IU/L

## 2021-01-14 RX ORDER — DICLOFENAC SODIUM 75 MG/1
75 TABLET, DELAYED RELEASE ORAL EVERY 12 HOURS PRN
Qty: 60 TABLET | Refills: 2 | Status: SHIPPED | OUTPATIENT
Start: 2021-01-14 | End: 2021-04-15

## 2021-01-14 NOTE — TELEPHONE ENCOUNTER
Medication: DICLOFENAC SODIUM 75 MG Oral Tab EC     Date of last refill: 10/13/2020  [place]    Last office visit: 12/03/2019  Due back to clinic per last office note:  None  Date next office visit scheduled:  None

## 2021-01-19 ENCOUNTER — HOSPITAL ENCOUNTER (OUTPATIENT)
Dept: MAMMOGRAPHY | Facility: HOSPITAL | Age: 56
Discharge: HOME | End: 2021-01-19
Attending: SURGERY
Payer: COMMERCIAL

## 2021-01-19 DIAGNOSIS — N63.0 BREAST MASS: ICD-10-CM

## 2021-01-19 DIAGNOSIS — N64.4 BREAST PAIN IN MALE: ICD-10-CM

## 2021-01-19 PROCEDURE — 76642 ULTRASOUND BREAST LIMITED: CPT | Performed by: SURGERY

## 2021-01-19 PROCEDURE — 77066 DX MAMMO INCL CAD BI: CPT | Performed by: SURGERY

## 2021-01-19 PROCEDURE — 77062 BREAST TOMOSYNTHESIS BI: CPT | Performed by: SURGERY

## 2021-01-22 ENCOUNTER — OFFICE VISIT (OUTPATIENT)
Dept: SURGERY | Facility: CLINIC | Age: 56
End: 2021-01-22
Payer: COMMERCIAL

## 2021-01-22 VITALS — HEART RATE: 62 BPM | TEMPERATURE: 98 F | SYSTOLIC BLOOD PRESSURE: 170 MMHG | DIASTOLIC BLOOD PRESSURE: 98 MMHG

## 2021-01-22 DIAGNOSIS — N62 GYNECOMASTIA: Primary | ICD-10-CM

## 2021-01-22 DIAGNOSIS — R82.90 URINE FINDING: ICD-10-CM

## 2021-01-22 LAB
APPEARANCE: CLEAR
MULTISTIX LOT#: 5077 NUMERIC
PH, URINE: 5.5 (ref 4.5–8)
PROTEIN (URINE DIPSTICK): 100 MG/DL
SPECIFIC GRAVITY: 1.03 (ref 1–1.03)
URINE-COLOR: YELLOW
UROBILINOGEN,SEMI-QN: 1 MG/DL (ref 0–1.9)

## 2021-01-22 PROCEDURE — 99243 OFF/OP CNSLTJ NEW/EST LOW 30: CPT | Performed by: UROLOGY

## 2021-01-22 PROCEDURE — 3077F SYST BP >= 140 MM HG: CPT | Performed by: UROLOGY

## 2021-01-22 PROCEDURE — 81003 URINALYSIS AUTO W/O SCOPE: CPT | Performed by: UROLOGY

## 2021-01-22 PROCEDURE — 3080F DIAST BP >= 90 MM HG: CPT | Performed by: UROLOGY

## 2021-01-22 NOTE — PROGRESS NOTES
Rooming Clinician:     Rosa Szymanski is a 50year old adult.   Patient presents with:  Consult: establish with a new urologist. c/o \"man boobs\"  asking about Androgen sensitivity        HPI:     Patient comes to the office with a long history of gynecomast • Loperamide HCl 2 MG Oral Cap Take 2 mg by mouth as needed for Diarrhea.      • Ondansetron HCl (ZOFRAN) 4 mg tablet TK 1 T PO Q 8 H  5   • Zolmitriptan 5 MG Oral Tablet Dispersible TK 1 T PO PRF HA  5   • ClonazePAM 0.5 MG Oral Tab TAKE 1 TABLET BY MOUT Depression    • Diarrhea, unspecified 2006    Migraine-caused. Monthly on average. Reglan resolves. • ED (erectile dysfunction)    • Environmental allergies    • Fatigue 2006    Migraine related.    • Feeling lonely 1990    Under control by Rx   • [address] MAIN OR   • TONSILLECTOMY  1993   • TRANSFORAMINAL LUMBAR EPIDURAL STEROID INJECTION MULTIPLE LEVEL Right 7/25/2019    Performed by Marissa Sullivan MD at [facility] ENDOSCOPY   • TRANSFORAMINAL LUMBAR EPIDURAL STEROID INJECTION MULTIPLE LEVEL Right 1/15/2019 prostate exam is 20-25 cc and benign.      NEURO: grossly normal  EXTREMITIES: no cyanosis, clubbing or edema    LAB:     Lab Results   Component Value Date    WBC 7.2 12/26/2020    HGB 15.4 12/26/2020    HCT 47.9 12/26/2020    .0 12/26/2020    CREAT Miranda Juan MD on 1/19/2021 at 9:31 AM       Us Breast Right Limited ([facility] Same Day Us)(cdp=90224)    Result Date: 1/19/2021            PROCEDURE:  US BREAST RIGHT LIMITED ([facility] SAME DAY US)(CPT=76642)  COMPARISON:  None.   TECHNIQUE:  Breast ultrasound wa

## 2021-01-30 DIAGNOSIS — M47.816 FACET ARTHROPATHY, LUMBAR: ICD-10-CM

## 2021-01-30 DIAGNOSIS — M46.1 SACROILIITIS (HCC): ICD-10-CM

## 2021-02-01 RX ORDER — BACLOFEN 10 MG/1
10 TABLET ORAL EVERY 8 HOURS PRN
Qty: 90 TABLET | Refills: 0 | Status: SHIPPED | OUTPATIENT
Start: 2021-02-01 | End: 2021-03-02

## 2021-02-04 ENCOUNTER — OFFICE VISIT (OUTPATIENT)
Dept: PAIN CLINIC | Facility: CLINIC | Age: 56
End: 2021-02-04
Payer: COMMERCIAL

## 2021-02-04 VITALS
WEIGHT: 288 LBS | OXYGEN SATURATION: 98 % | DIASTOLIC BLOOD PRESSURE: 86 MMHG | HEART RATE: 66 BPM | HEIGHT: 70 IN | SYSTOLIC BLOOD PRESSURE: 148 MMHG | BODY MASS INDEX: 41.23 KG/M2 | RESPIRATION RATE: 16 BRPM

## 2021-02-04 DIAGNOSIS — M47.816 LUMBAR FACET ARTHROPATHY: Primary | ICD-10-CM

## 2021-02-04 PROCEDURE — 96127 BRIEF EMOTIONAL/BEHAV ASSMT: CPT | Performed by: ANESTHESIOLOGY

## 2021-02-04 PROCEDURE — 3079F DIAST BP 80-89 MM HG: CPT | Performed by: ANESTHESIOLOGY

## 2021-02-04 PROCEDURE — 3008F BODY MASS INDEX DOCD: CPT | Performed by: ANESTHESIOLOGY

## 2021-02-04 PROCEDURE — 99215 OFFICE O/P EST HI 40 MIN: CPT | Performed by: ANESTHESIOLOGY

## 2021-02-04 PROCEDURE — 3077F SYST BP >= 140 MM HG: CPT | Performed by: ANESTHESIOLOGY

## 2021-02-04 NOTE — PROGRESS NOTES
Last procedure: RADIOFREQUENCY ABLATION OF RIGHT SACRAL LATERAL BRANCHES WITH SEDATION  Date: 12/17/2019  Percentage of relief obtained: 90%  Duration of relief: about an year      Patient presents in office today with reported pain in lower back    [place]

## 2021-02-11 NOTE — PROGRESS NOTES
Name: Beckie Szymanski   : 10/20/1965   DOS: 2/3/2021      Pain Clinic Follow Up Visit:   Beckie Szymanski is a 50year old male who presents for recheck of his chronic low back pain.   He is status post right transforaminal lumbar epidural steroid injection and one time. Inject once monthly      • Metoclopramide HCl (REGLAN) 5 MG Oral Tab Take 5 mg by mouth. Take 1 when experiencing migraine induced diarrhea. Take along with Benadryl     • Loperamide HCl 2 MG Oral Cap Take 2 mg by mouth as needed for Diarrhea. gynecomastia. The patient is on Gralise 300 mg nightly and Lyrica 25 mg p.o. 5 times a day for his headache. I will continue baclofen 10 mg p.o. 3 times daily and diclofenac 75 mg daily.   These medications are prescribed by his neurologist.  Orders:No or

## 2021-02-17 ENCOUNTER — TELEPHONE (OUTPATIENT)
Dept: SURGERY | Facility: CLINIC | Age: 56
End: 2021-02-17

## 2021-02-17 NOTE — TELEPHONE ENCOUNTER
Pt states he think he passed a kidney stone yesterday asking for after care treatment or plan asking is there any medication needed please advise

## 2021-02-17 NOTE — TELEPHONE ENCOUNTER
I called and spoke to the patient. He stated that last weekend he developed a pain in his groin, urgency and he felt that he could not completely empty his bladder. He had a little blood in his urine last night when he thinks he passed his stone.   The pa

## 2021-03-02 DIAGNOSIS — M47.816 FACET ARTHROPATHY, LUMBAR: ICD-10-CM

## 2021-03-02 DIAGNOSIS — M46.1 SACROILIITIS (HCC): ICD-10-CM

## 2021-03-02 RX ORDER — BACLOFEN 10 MG/1
10 TABLET ORAL EVERY 8 HOURS PRN
Qty: 90 TABLET | Refills: 0 | Status: SHIPPED | OUTPATIENT
Start: 2021-03-02 | End: 2021-04-07

## 2021-03-02 NOTE — TELEPHONE ENCOUNTER
Medication: BACLOFEN 10 MG Oral Tab    Date of last refill: 2/1/2021    Last office visit: 2/4/2021  Due back to clinic per last office note:  Not indicated  Date next office visit scheduled:  None    Last OV note recommendation:   ASSESSMENT AND PLAN:

## 2021-03-12 DIAGNOSIS — I10 ESSENTIAL HYPERTENSION: ICD-10-CM

## 2021-03-12 RX ORDER — LOSARTAN POTASSIUM 25 MG/1
TABLET ORAL
Qty: 30 TABLET | Refills: 0 | Status: SHIPPED | OUTPATIENT
Start: 2021-03-12 | End: 2021-04-08
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
## 2021-03-22 ENCOUNTER — PATIENT MESSAGE (OUTPATIENT)
Dept: INTERNAL MEDICINE CLINIC | Facility: CLINIC | Age: 56
End: 2021-03-22

## 2021-03-22 NOTE — TELEPHONE ENCOUNTER
From: Darlin Szymanski  To: Leo Pham MD  Sent: 3/22/2021 1:04 PM CDT  Subject: Non-Urgent Medical Question    This message is being sent by Sky Szymanski on behalf of Audie Szymanski. Vi Haley had his first vaccine shot last week.  His second shot is schedule

## 2021-03-30 DIAGNOSIS — I10 ESSENTIAL HYPERTENSION: ICD-10-CM

## 2021-03-30 RX ORDER — ATENOLOL 25 MG/1
TABLET ORAL
Qty: 180 TABLET | Refills: 0 | Status: SHIPPED | OUTPATIENT
Start: 2021-03-30 | End: 2021-06-14

## 2021-04-07 DIAGNOSIS — M47.816 FACET ARTHROPATHY, LUMBAR: ICD-10-CM

## 2021-04-07 DIAGNOSIS — I10 ESSENTIAL HYPERTENSION: ICD-10-CM

## 2021-04-07 DIAGNOSIS — M46.1 SACROILIITIS (HCC): ICD-10-CM

## 2021-04-07 RX ORDER — BACLOFEN 10 MG/1
10 TABLET ORAL EVERY 8 HOURS PRN
Qty: 90 TABLET | Refills: 0 | Status: SHIPPED | OUTPATIENT
Start: 2021-04-07 | End: 2021-05-06

## 2021-04-07 NOTE — TELEPHONE ENCOUNTER
Medication: Baclofen 10 mg    Date of last refill: 3/2/21      Last office visit: 2/4/21  Due back to clinic per last office note:  NA  Date next office visit scheduled:  none        Last OV note recommendation:   ASSESSMENT AND PLAN:      Sacroiliitis  Kimberli Macdonald

## 2021-04-08 RX ORDER — LOSARTAN POTASSIUM 25 MG/1
TABLET ORAL
Qty: 30 TABLET | Refills: 0 | Status: SHIPPED | OUTPATIENT
Start: 2021-04-08 | End: 2021-05-07

## 2021-04-15 DIAGNOSIS — M46.1 SACROILIAC INFLAMMATION (HCC): ICD-10-CM

## 2021-04-15 RX ORDER — DICLOFENAC SODIUM 75 MG/1
75 TABLET, DELAYED RELEASE ORAL EVERY 12 HOURS PRN
Qty: 60 TABLET | Refills: 2 | Status: SHIPPED | OUTPATIENT
Start: 2021-04-15 | End: 2021-07-15

## 2021-04-15 NOTE — TELEPHONE ENCOUNTER
Medication: Diclofenac Sodium 75 MG Oral Tab EC     Date of last refill: 1/14/2021  W/ 2 refills    Last office visit: 2/4/2021  Due back to clinic per last office note:  Not indicated  Date next office visit scheduled:  none      Last OV note recommendati

## 2021-05-05 DIAGNOSIS — M46.1 SACROILIITIS (HCC): ICD-10-CM

## 2021-05-05 DIAGNOSIS — I10 ESSENTIAL HYPERTENSION: ICD-10-CM

## 2021-05-05 DIAGNOSIS — M47.816 FACET ARTHROPATHY, LUMBAR: ICD-10-CM

## 2021-05-05 NOTE — TELEPHONE ENCOUNTER
LM's for patient to call back and schedule his Physical Appointment to release refill request.        Last OV relevant to medication: 12/1/2020  Last refill date: 4/8/2021     #/refills: 30/0  When pt was asked to return for OV: 3 months - PE  Upcoming kiko

## 2021-05-06 RX ORDER — BACLOFEN 10 MG/1
10 TABLET ORAL EVERY 8 HOURS PRN
Qty: 90 TABLET | Refills: 0 | Status: SHIPPED | OUTPATIENT
Start: 2021-05-06 | End: 2021-05-28

## 2021-05-07 RX ORDER — LOSARTAN POTASSIUM 25 MG/1
TABLET ORAL
Qty: 30 TABLET | Refills: 0 | Status: SHIPPED | OUTPATIENT
Start: 2021-05-07 | End: 2021-06-02

## 2021-05-12 ENCOUNTER — OFFICE VISIT (OUTPATIENT)
Dept: INTERNAL MEDICINE CLINIC | Facility: CLINIC | Age: 56
End: 2021-05-12
Payer: COMMERCIAL

## 2021-05-12 VITALS
OXYGEN SATURATION: 98 % | RESPIRATION RATE: 14 BRPM | SYSTOLIC BLOOD PRESSURE: 122 MMHG | WEIGHT: 293.88 LBS | HEIGHT: 70 IN | BODY MASS INDEX: 42.07 KG/M2 | TEMPERATURE: 99 F | HEART RATE: 58 BPM | DIASTOLIC BLOOD PRESSURE: 76 MMHG

## 2021-05-12 DIAGNOSIS — S31.831A ANAL TEAR: Primary | ICD-10-CM

## 2021-05-12 DIAGNOSIS — Z00.00 PHYSICAL EXAM, ANNUAL: ICD-10-CM

## 2021-05-12 DIAGNOSIS — Z12.5 SCREENING FOR PROSTATE CANCER: ICD-10-CM

## 2021-05-12 PROCEDURE — 3074F SYST BP LT 130 MM HG: CPT | Performed by: INTERNAL MEDICINE

## 2021-05-12 PROCEDURE — 3078F DIAST BP <80 MM HG: CPT | Performed by: INTERNAL MEDICINE

## 2021-05-12 PROCEDURE — 99213 OFFICE O/P EST LOW 20 MIN: CPT | Performed by: INTERNAL MEDICINE

## 2021-05-12 PROCEDURE — 3008F BODY MASS INDEX DOCD: CPT | Performed by: INTERNAL MEDICINE

## 2021-05-12 RX ORDER — DIAPER,BRIEF,INFANT-TODD,DISP
EACH MISCELLANEOUS AS NEEDED
COMMUNITY
End: 2021-09-24 | Stop reason: ALTCHOICE

## 2021-05-12 NOTE — PROGRESS NOTES
[facility]    CHIEF COMPLAINT:  Patient presents with:  Pain: Located in and above Buttcrack and anus        HISTORY OF PRESENT ILLNESS:  Complains of discharge and some blood from a spot above his anus. Happened about a week and a half ago.  Noti Q 8 H  5   • Zolmitriptan 5 MG Oral Tablet Dispersible TK 1 T PO PRF HA  5   • ClonazePAM 0.5 MG Oral Tab TAKE 1 TABLET BY MOUTH FIVE TIMES DAILY 150 tablet 0   • Methylphenidate HCl (RITALIN) 5 MG Oral Tab take 2 by mouth twice daily and 1 by mouth each a week and a half. Use petroleum jelly or diaper rash cream as a barrrier on the cut. Try to avoid wiping hard. Continue hydration. Monitor. 2. Physical exam, annual  Do labs prior to cpx.   - CBC WITH DIFFERENTIAL WITH PLATELET;  Future  - COMP MET

## 2021-05-28 DIAGNOSIS — M47.816 FACET ARTHROPATHY, LUMBAR: ICD-10-CM

## 2021-05-28 DIAGNOSIS — M46.1 SACROILIITIS (HCC): ICD-10-CM

## 2021-05-28 NOTE — TELEPHONE ENCOUNTER
Medication: BACLOFEN 10 MG Oral Tab    Date of last refill: 05/06/21    Last office visit: 02/04/21  Due back to clinic per last office note:  NA  Date next office visit scheduled:  none      Last OV note recommendation:   ASSESSMENT AND PLAN:      Sacroil

## 2021-05-29 ENCOUNTER — TELEPHONE (OUTPATIENT)
Dept: INTERNAL MEDICINE CLINIC | Facility: CLINIC | Age: 56
End: 2021-05-29

## 2021-05-29 DIAGNOSIS — I10 ESSENTIAL HYPERTENSION: ICD-10-CM

## 2021-06-01 RX ORDER — BACLOFEN 10 MG/1
10 TABLET ORAL EVERY 8 HOURS PRN
Qty: 90 TABLET | Refills: 0 | Status: SHIPPED | OUTPATIENT
Start: 2021-06-01 | End: 2021-07-06

## 2021-06-02 RX ORDER — LOSARTAN POTASSIUM 25 MG/1
TABLET ORAL
Qty: 30 TABLET | Refills: 0 | Status: SHIPPED | OUTPATIENT
Start: 2021-06-02 | End: 2021-07-06

## 2021-06-02 NOTE — TELEPHONE ENCOUNTER
Recent OV. Due for PE and labs to complete just prior. One-off protocol. PSR - please follow up with pt to schedule their PE and remind to do fasting labs at least 1 day in advance of appt. Thanks.

## 2021-06-02 NOTE — TELEPHONE ENCOUNTER
Patient is unable to drive. His wife schedules his appts and is out of town until June 7th. He said she will call the office back to schedule an appt.

## 2021-06-10 ENCOUNTER — TELEPHONE (OUTPATIENT)
Dept: INTERNAL MEDICINE CLINIC | Facility: CLINIC | Age: 56
End: 2021-06-10

## 2021-06-10 ENCOUNTER — TELEMEDICINE (OUTPATIENT)
Dept: INTERNAL MEDICINE CLINIC | Facility: CLINIC | Age: 56
End: 2021-06-10

## 2021-06-10 VITALS — WEIGHT: 294.63 LBS | BODY MASS INDEX: 42.18 KG/M2 | HEIGHT: 70 IN

## 2021-06-10 DIAGNOSIS — R21 RASH AND NONSPECIFIC SKIN ERUPTION: Primary | ICD-10-CM

## 2021-06-10 PROBLEM — F43.10 POST TRAUMATIC STRESS DISORDER (PTSD): Status: ACTIVE | Noted: 2021-06-10

## 2021-06-10 PROBLEM — M48.061 SPINAL STENOSIS OF LUMBAR REGION WITHOUT NEUROGENIC CLAUDICATION: Status: ACTIVE | Noted: 2018-01-23

## 2021-06-10 PROBLEM — R41.3 MEMORY LOSS OF UNKNOWN CAUSE: Status: ACTIVE | Noted: 2017-07-11

## 2021-06-10 PROCEDURE — 3008F BODY MASS INDEX DOCD: CPT | Performed by: NURSE PRACTITIONER

## 2021-06-10 PROCEDURE — 99213 OFFICE O/P EST LOW 20 MIN: CPT | Performed by: NURSE PRACTITIONER

## 2021-06-10 RX ORDER — CLOTRIMAZOLE AND BETAMETHASONE DIPROPIONATE 10; .64 MG/G; MG/G
1 CREAM TOPICAL 2 TIMES DAILY
Qty: 45 G | Refills: 1 | Status: SHIPPED | OUTPATIENT
Start: 2021-06-10 | End: 2021-06-17

## 2021-06-10 RX ORDER — NYSTATIN 100000 [USP'U]/G
1 POWDER TOPICAL 3 TIMES DAILY PRN
Qty: 1 EACH | Refills: 0 | Status: SHIPPED | OUTPATIENT
Start: 2021-06-10

## 2021-06-10 NOTE — TELEPHONE ENCOUNTER
Patient believes he has a yeast infection. He is not able to come in to the office due to a migraine and transportation problems.     Virtual/Telephone Consent    Audie BECKMAN Minor verbally consents to a Virtual/Telephone Check-In service on 6/.  Patient unders

## 2021-06-10 NOTE — PATIENT INSTRUCTIONS
Start the Lotrisone cream as directed. Monitor effectiveness. If improvement then continue with nystatin powder as needed going forward. Get your labs done. You should be fasting for at least 10 hours.  If you take a multivitamin with Biotin or any bi your healthcare provider, or as advised. Call your provider if the rash is not starting to improve after 10 days of treatment, or if the rash continues to spread.    When to seek medical advice  Call your healthcare provider right away if any of these occur

## 2021-06-10 NOTE — PROGRESS NOTES
Virtual video Check-In    Audie BECKMAN Minor verbally consents to a Virtual/video Check-In visit on 06/10/21. Patient has been referred to the [facility] website at [email or web address] to review the yearly Consent to Treat document.     Patient understands and ac nightly. 90 tablet 3   • GRALISE 300 MG Oral Tab TAKE 1 TABLET BY MOUTH EVERY EVENING WITH DINNER. SWALLOW WHOLE DO NOT CHEW, CRUSH, OR SPLIT.  6   • erenumab-aooe 70 MG/ML SC Inject 140 mg into the skin one time.  Inject once monthly      • Metoclopramide History of mental disorder 1990   • Hypersomnia, unspecified    • Hypertension    • Itch of skin Childhood    Allergies   • Knee pain, left 4/3/2007   • Lipid screening 12/9/2010   • Migraine    • Migraines    • MTHFR (methylene THF reductase) deficiency a GLOBULIN 3.7 12/26/2020     12/26/2020    K 4.2 12/26/2020     12/26/2020    CO2 28.0 12/26/2020      Lab Results   Component Value Date    CHOLEST 239 (H) 12/26/2020    TRIG 195 (H) 12/26/2020    HDL 38 (L) 12/26/2020     (H) 12/26/2020 for sufficient and adequate time. This billing was spent on reviewing labs, medications, radiology tests and decision making. Appropriate medical decision-making and tests are ordered as detailed in the plan of care above.

## 2021-06-11 DIAGNOSIS — I10 ESSENTIAL HYPERTENSION: ICD-10-CM

## 2021-06-14 RX ORDER — ATENOLOL 25 MG/1
TABLET ORAL
Qty: 180 TABLET | Refills: 0 | Status: SHIPPED | OUTPATIENT
Start: 2021-06-14 | End: 2021-09-09

## 2021-06-23 ENCOUNTER — LAB ENCOUNTER (OUTPATIENT)
Dept: LAB | Age: 56
End: 2021-06-23
Attending: INTERNAL MEDICINE
Payer: COMMERCIAL

## 2021-06-23 DIAGNOSIS — Z12.5 SCREENING FOR PROSTATE CANCER: ICD-10-CM

## 2021-06-23 DIAGNOSIS — Z00.00 PHYSICAL EXAM, ANNUAL: ICD-10-CM

## 2021-06-23 PROCEDURE — 84153 ASSAY OF PSA TOTAL: CPT | Performed by: INTERNAL MEDICINE

## 2021-06-23 PROCEDURE — 83036 HEMOGLOBIN GLYCOSYLATED A1C: CPT | Performed by: INTERNAL MEDICINE

## 2021-06-23 PROCEDURE — 80061 LIPID PANEL: CPT | Performed by: INTERNAL MEDICINE

## 2021-06-23 PROCEDURE — 80050 GENERAL HEALTH PANEL: CPT | Performed by: INTERNAL MEDICINE

## 2021-06-25 ENCOUNTER — OFFICE VISIT (OUTPATIENT)
Dept: INTERNAL MEDICINE CLINIC | Facility: CLINIC | Age: 56
End: 2021-06-25
Payer: COMMERCIAL

## 2021-06-25 VITALS
SYSTOLIC BLOOD PRESSURE: 128 MMHG | HEIGHT: 70 IN | BODY MASS INDEX: 42.02 KG/M2 | WEIGHT: 293.5 LBS | HEART RATE: 72 BPM | TEMPERATURE: 99 F | RESPIRATION RATE: 16 BRPM | DIASTOLIC BLOOD PRESSURE: 76 MMHG

## 2021-06-25 DIAGNOSIS — L72.9 CYST OF SKIN: ICD-10-CM

## 2021-06-25 DIAGNOSIS — N62 GYNECOMASTIA: ICD-10-CM

## 2021-06-25 DIAGNOSIS — Z00.00 PHYSICAL EXAM, ANNUAL: Primary | ICD-10-CM

## 2021-06-25 DIAGNOSIS — E78.5 HYPERLIPIDEMIA, UNSPECIFIED HYPERLIPIDEMIA TYPE: ICD-10-CM

## 2021-06-25 DIAGNOSIS — Z12.11 SCREENING FOR COLON CANCER: ICD-10-CM

## 2021-06-25 PROCEDURE — 3008F BODY MASS INDEX DOCD: CPT | Performed by: INTERNAL MEDICINE

## 2021-06-25 PROCEDURE — 82272 OCCULT BLD FECES 1-3 TESTS: CPT | Performed by: INTERNAL MEDICINE

## 2021-06-25 PROCEDURE — 3074F SYST BP LT 130 MM HG: CPT | Performed by: INTERNAL MEDICINE

## 2021-06-25 PROCEDURE — 99396 PREV VISIT EST AGE 40-64: CPT | Performed by: INTERNAL MEDICINE

## 2021-06-25 PROCEDURE — 3078F DIAST BP <80 MM HG: CPT | Performed by: INTERNAL MEDICINE

## 2021-06-25 RX ORDER — CLOTRIMAZOLE AND BETAMETHASONE DIPROPIONATE 10; .64 MG/G; MG/G
CREAM TOPICAL 2 TIMES DAILY
COMMUNITY
End: 2021-09-24 | Stop reason: ALTCHOICE

## 2021-06-25 NOTE — PROGRESS NOTES
[facility]    CHIEF COMPLAINT: Patient presents with:  Routine Physical  Immunization/Injection: last tetanus 2007         HPI:   Caro Szymanski is a 50year old male who presents for a complete physical exam.      psa done.  Normal.   Has not done Tab Take 1 tablet (20 mg total) by mouth nightly. 90 tablet 3   • GRALISE 300 MG Oral Tab TAKE 1 TABLET BY MOUTH EVERY EVENING WITH DINNER. SWALLOW WHOLE DO NOT CHEW, CRUSH, OR SPLIT.  6   • erenumab-aooe 70 MG/ML SC Inject 140 mg into the skin one time.  I migraine w/ & w/o aura   • High cholesterol 2016    Current treatment dietary changes only.    • History of depression 1990   • History of eating disorder 1989    Anorexia nervosa   • History of mental disorder 1990   • Hypersomnia, unspecified    • Hyperte (lung cancer) Maternal Aunt    • Heart Attack Maternal Uncle         smoker   • Heart Disease Neg       Social History:   Social History    Tobacco Use      Smoking status: Never Smoker      Smokeless tobacco: Never Used    Vaping Use      Vaping Use: [employer] HGB 15.8 06/23/2021 11:18 AM    .0 06/23/2021 11:18 AM      Lab Results   Component Value Date/Time    GLU 80 06/23/2021 11:18 AM     06/23/2021 11:18 AM    K 4.3 06/23/2021 11:18 AM     06/23/2021 11:18 AM    CO2 31.0 06/23/2021 11:18 A

## 2021-07-03 DIAGNOSIS — M47.816 FACET ARTHROPATHY, LUMBAR: ICD-10-CM

## 2021-07-03 DIAGNOSIS — I10 ESSENTIAL HYPERTENSION: ICD-10-CM

## 2021-07-03 DIAGNOSIS — M46.1 SACROILIITIS (HCC): ICD-10-CM

## 2021-07-06 RX ORDER — LOSARTAN POTASSIUM 25 MG/1
TABLET ORAL
Qty: 90 TABLET | Refills: 1 | Status: SHIPPED | OUTPATIENT
Start: 2021-07-06 | End: 2022-01-11

## 2021-07-06 RX ORDER — BACLOFEN 10 MG/1
10 TABLET ORAL EVERY 8 HOURS PRN
Qty: 90 TABLET | Refills: 0 | Status: SHIPPED | OUTPATIENT
Start: 2021-07-06 | End: 2021-08-05

## 2021-07-06 NOTE — TELEPHONE ENCOUNTER
Medication: BACLOFEN 10 MG Oral Tab     Date of last refill: 06/01/21      Last office visit: 02/04/21  Due back to clinic per last office note:  na  Date next office visit scheduled:  none        Last OV note recommendation:   ASSESSMENT AND PLAN:      Sa

## 2021-07-14 DIAGNOSIS — M46.1 SACROILIAC INFLAMMATION (HCC): ICD-10-CM

## 2021-07-15 RX ORDER — DICLOFENAC SODIUM 75 MG/1
75 TABLET, DELAYED RELEASE ORAL EVERY 12 HOURS PRN
Qty: 60 TABLET | Refills: 2 | Status: SHIPPED | OUTPATIENT
Start: 2021-07-15 | End: 2021-11-12

## 2021-08-05 DIAGNOSIS — M46.1 SACROILIITIS (HCC): ICD-10-CM

## 2021-08-05 DIAGNOSIS — M47.816 FACET ARTHROPATHY, LUMBAR: ICD-10-CM

## 2021-08-05 RX ORDER — BACLOFEN 10 MG/1
10 TABLET ORAL EVERY 8 HOURS PRN
Qty: 90 TABLET | Refills: 0 | Status: SHIPPED | OUTPATIENT
Start: 2021-08-05 | End: 2021-08-30

## 2021-08-16 ENCOUNTER — LAB ENCOUNTER (OUTPATIENT)
Dept: LAB | Age: 56
End: 2021-08-16
Attending: STUDENT IN AN ORGANIZED HEALTH CARE EDUCATION/TRAINING PROGRAM
Payer: COMMERCIAL

## 2021-08-16 DIAGNOSIS — Z12.11 ENCOUNTER FOR SCREENING COLONOSCOPY: ICD-10-CM

## 2021-08-17 LAB — SARS-COV-2 RNA RESP QL NAA+PROBE: NOT DETECTED

## 2021-08-30 DIAGNOSIS — M47.816 FACET ARTHROPATHY, LUMBAR: ICD-10-CM

## 2021-08-30 DIAGNOSIS — M46.1 SACROILIITIS (HCC): ICD-10-CM

## 2021-08-30 RX ORDER — BACLOFEN 10 MG/1
10 TABLET ORAL EVERY 8 HOURS PRN
Qty: 90 TABLET | Refills: 0 | Status: SHIPPED | OUTPATIENT
Start: 2021-08-30 | End: 2021-10-04

## 2021-08-30 NOTE — TELEPHONE ENCOUNTER
Medication: BACLOFEN 10 MG Oral Tab    Date of last refill: 08/05/21      Last office visit: 02/04/21  Due back to clinic per last office note:  na Date next office visit scheduled:  none        Last OV note recommendation:   ASSESSMENT AND PLAN:      Sacr

## 2021-09-09 DIAGNOSIS — I10 ESSENTIAL HYPERTENSION: ICD-10-CM

## 2021-09-09 RX ORDER — ATENOLOL 25 MG/1
TABLET ORAL
Qty: 180 TABLET | Refills: 1 | Status: SHIPPED | OUTPATIENT
Start: 2021-09-09

## 2021-09-24 ENCOUNTER — OFFICE VISIT (OUTPATIENT)
Dept: PAIN CLINIC | Facility: CLINIC | Age: 56
End: 2021-09-24
Payer: COMMERCIAL

## 2021-09-24 VITALS
OXYGEN SATURATION: 96 % | SYSTOLIC BLOOD PRESSURE: 168 MMHG | DIASTOLIC BLOOD PRESSURE: 86 MMHG | HEART RATE: 78 BPM | BODY MASS INDEX: 40 KG/M2 | WEIGHT: 286 LBS

## 2021-09-24 DIAGNOSIS — M47.816 LUMBAR FACET ARTHROPATHY: Primary | ICD-10-CM

## 2021-09-24 DIAGNOSIS — M47.817 LUMBOSACRAL SPONDYLOSIS WITHOUT MYELOPATHY: ICD-10-CM

## 2021-09-24 PROCEDURE — 99213 OFFICE O/P EST LOW 20 MIN: CPT | Performed by: NURSE PRACTITIONER

## 2021-09-24 PROCEDURE — 3077F SYST BP >= 140 MM HG: CPT | Performed by: NURSE PRACTITIONER

## 2021-09-24 PROCEDURE — 3079F DIAST BP 80-89 MM HG: CPT | Performed by: NURSE PRACTITIONER

## 2021-09-24 RX ORDER — ERENUMAB-AOOE 140 MG/ML
INJECTION, SOLUTION SUBCUTANEOUS
COMMUNITY
Start: 2021-09-08

## 2021-09-24 NOTE — PROGRESS NOTES
HPI:   Casandra Szymanski presents to discuss medication. He is patient of Dr. Papi Gonzales. He is on baclofen 10mg tid and diclofenac 75mg bid.   He has not been in office since 2/4/21 at which time he wsa seen for recheck of chronic low back pain/he has had right TF Abscess of thigh      Comment:  left  5/27/2009: Sigmoid polyp      Comment:  4 mm.  5/27/2009: Rectal polyp      Comment:  3 mm. 12/9/2010: Lipid screening  2016: High cholesterol      Comment:  Current treatment dietary changes only.   2017: Back pain bed or a chair independently.     Current Medications:  Current Outpatient Medications   Medication Sig Dispense Refill   • ATENOLOL 25 MG Oral Tab TAKE 1 TABLET BY MOUTH TWICE A  tablet 1   • BACLOFEN 10 MG Oral Tab TAKE 1 TABLET (10 MG TOTAL) BY MO afternoon. ) 150 tablet 0   • LYRICA 25 MG Oral Cap Take 25 mg by mouth. Take 1 tablet every 3 hours while awake  Up to 5 tablets per day   3   • DiphenhydrAMINE HCl (BENADRYL) 25 MG Oral Tab Take 25 mg by mouth every 6 (six) hours as needed.         Side e months for med refill or sooner if injx therapy needed or changes in pain  Plan:   >continue diclofenac 75mg bid and baclofen 10mg tid  >follow up every 6 months since non opioids are being prescribed  >patient may follow up sooner than 6 months if pain sy

## 2021-09-24 NOTE — PROGRESS NOTES
Patient presents in office today with reported pain in lumbar spine    Current pain level reported = 2/10    Last reported dose of baclofen and diclofenac this morning      Narcotic Contract renewal na    Urine Drug screen na

## 2021-10-01 DIAGNOSIS — M46.1 SACROILIITIS (HCC): ICD-10-CM

## 2021-10-01 DIAGNOSIS — M47.816 FACET ARTHROPATHY, LUMBAR: ICD-10-CM

## 2021-10-01 NOTE — TELEPHONE ENCOUNTER
Medication: BACLOFEN 10 MG Oral Tab     Date of last refill: 08/30/2021  [place]     Last office visit: 09/24/2021  Due back to clinic per last office note: none   Date next office visit scheduled: none     Last URINE Screen: n/a  Screen Results:  N/a       Narcotic Contract EXPIRATION date: n/a     Last OV note recommendation:     Plan:   >continue diclofenac 75mg bid and baclofen 10mg tid  >follow up every 6 months since non opioids are being prescribed  >patient may follow up sooner than 6 months if pain symptoms worsen and he requires intervention/injections  No orders of the defined types were placed in this encounter

## 2021-10-04 RX ORDER — BACLOFEN 10 MG/1
10 TABLET ORAL EVERY 8 HOURS PRN
Qty: 90 TABLET | Refills: 0 | Status: SHIPPED | OUTPATIENT
Start: 2021-10-04 | End: 2021-10-29

## 2021-10-29 DIAGNOSIS — M47.816 FACET ARTHROPATHY, LUMBAR: ICD-10-CM

## 2021-10-29 DIAGNOSIS — M46.1 SACROILIITIS (HCC): ICD-10-CM

## 2021-10-29 RX ORDER — BACLOFEN 10 MG/1
10 TABLET ORAL EVERY 8 HOURS PRN
Qty: 90 TABLET | Refills: 0 | Status: SHIPPED | OUTPATIENT
Start: 2021-10-29 | End: 2021-11-29

## 2021-10-29 NOTE — TELEPHONE ENCOUNTER
Medication: BACLOFEN 10 MG Oral Tab    Date of last refill: 10/04/21      Last office visit: 09/24/21  Due back to clinic per last office note:  6 months  Date next office visit scheduled:  NA        Last OV note recommendation:   Medical Decision Making:

## 2021-11-12 DIAGNOSIS — M46.1 SACROILIAC INFLAMMATION (HCC): ICD-10-CM

## 2021-11-12 RX ORDER — DICLOFENAC SODIUM 75 MG/1
75 TABLET, DELAYED RELEASE ORAL EVERY 12 HOURS PRN
Qty: 60 TABLET | Refills: 2 | Status: SHIPPED | OUTPATIENT
Start: 2021-11-12 | End: 2022-02-10

## 2021-11-12 NOTE — TELEPHONE ENCOUNTER
Medication: DICLOFENAC SODIUM 75 MG Oral     Date of last refill: 07/15/21      Last office visit: 09/24/21  Due back to clinic per last office note:  6 month   Date next office visit scheduled:  na      Last OV note recommendation:     [address]

## 2021-11-29 DIAGNOSIS — M46.1 SACROILIITIS (HCC): ICD-10-CM

## 2021-11-29 DIAGNOSIS — M47.816 FACET ARTHROPATHY, LUMBAR: ICD-10-CM

## 2021-11-29 RX ORDER — BACLOFEN 10 MG/1
10 TABLET ORAL EVERY 8 HOURS PRN
Qty: 90 TABLET | Refills: 0 | Status: SHIPPED | OUTPATIENT
Start: 2021-11-29 | End: 2021-12-23

## 2021-11-29 NOTE — TELEPHONE ENCOUNTER
Medication: BACLOFEN 10 MG Oral Tab    Date of last refill: 10/29/21      Last office visit: 09/24/21  Due back to clinic per last office note:  6 months   Date next office visit scheduled:  na      Last OV note recommendation:     Medical Decision Making:

## 2021-12-23 DIAGNOSIS — M47.816 FACET ARTHROPATHY, LUMBAR: ICD-10-CM

## 2021-12-23 DIAGNOSIS — M46.1 SACROILIITIS (HCC): ICD-10-CM

## 2021-12-23 RX ORDER — BACLOFEN 10 MG/1
10 TABLET ORAL EVERY 8 HOURS PRN
Qty: 90 TABLET | Refills: 0 | Status: SHIPPED | OUTPATIENT
Start: 2021-12-23 | End: 2022-01-20

## 2021-12-23 NOTE — TELEPHONE ENCOUNTER
Medication: BACLOFEN 10 MG Oral Tab    Date of last refill: 11/29/21      Last office visit: 09/24/21  Due back to clinic per last office note:  6 months   Date next office visit scheduled:  na      Last OV note recommendation:     Medical Decision Making:

## 2022-01-11 DIAGNOSIS — I10 ESSENTIAL HYPERTENSION: ICD-10-CM

## 2022-01-11 RX ORDER — LOSARTAN POTASSIUM 25 MG/1
TABLET ORAL
Qty: 30 TABLET | Refills: 0 | Status: SHIPPED | OUTPATIENT
Start: 2022-01-11 | End: 2022-02-03

## 2022-01-11 NOTE — TELEPHONE ENCOUNTER
Hypertension Medications Protocol Failed 01/11/2022 12:26 AM   Protocol Details  Appointment in past 6 or next 3 months    CMP or BMP in past 12 months    Last serum creatinine< 2.0       Last OV relevant to medication: 6/25/2021 annual  Last refill date:

## 2022-01-20 DIAGNOSIS — M46.1 SACROILIITIS (HCC): ICD-10-CM

## 2022-01-20 DIAGNOSIS — M47.816 FACET ARTHROPATHY, LUMBAR: ICD-10-CM

## 2022-01-20 RX ORDER — BACLOFEN 10 MG/1
10 TABLET ORAL EVERY 8 HOURS PRN
Qty: 90 TABLET | Refills: 0 | Status: SHIPPED | OUTPATIENT
Start: 2022-01-20 | End: 2022-02-19

## 2022-01-20 NOTE — TELEPHONE ENCOUNTER
Medication:BACLOFEN 10 MG Oral Tab    Date of last refill: 12/23/21      Last office visit: 09/24/21  Due back to clinic per last office note:  6 months  Date next office visit scheduled:  na      Last OV note recommendation:      Medical Decision Making:

## 2022-02-03 RX ORDER — LOSARTAN POTASSIUM 25 MG/1
TABLET ORAL
Qty: 30 TABLET | Refills: 0 | Status: SHIPPED | OUTPATIENT
Start: 2022-02-03 | End: 2022-03-08

## 2022-02-12 ENCOUNTER — OFFICE VISIT (OUTPATIENT)
Dept: INTERNAL MEDICINE CLINIC | Facility: CLINIC | Age: 57
End: 2022-02-12
Payer: COMMERCIAL

## 2022-02-12 VITALS
BODY MASS INDEX: 41.46 KG/M2 | OXYGEN SATURATION: 98 % | TEMPERATURE: 98 F | DIASTOLIC BLOOD PRESSURE: 76 MMHG | RESPIRATION RATE: 16 BRPM | HEART RATE: 78 BPM | WEIGHT: 296.13 LBS | SYSTOLIC BLOOD PRESSURE: 126 MMHG | HEIGHT: 71 IN

## 2022-02-12 DIAGNOSIS — Z00.00 PHYSICAL EXAM, ANNUAL: ICD-10-CM

## 2022-02-12 DIAGNOSIS — I10 ESSENTIAL HYPERTENSION: ICD-10-CM

## 2022-02-12 DIAGNOSIS — N62 GYNECOMASTIA: ICD-10-CM

## 2022-02-12 DIAGNOSIS — Z12.5 SCREENING FOR PROSTATE CANCER: ICD-10-CM

## 2022-02-12 DIAGNOSIS — Z23 NEED FOR VACCINATION: ICD-10-CM

## 2022-02-12 DIAGNOSIS — E78.5 HYPERLIPIDEMIA, UNSPECIFIED HYPERLIPIDEMIA TYPE: Primary | ICD-10-CM

## 2022-02-12 PROCEDURE — 90471 IMMUNIZATION ADMIN: CPT | Performed by: INTERNAL MEDICINE

## 2022-02-12 PROCEDURE — 90686 IIV4 VACC NO PRSV 0.5 ML IM: CPT | Performed by: INTERNAL MEDICINE

## 2022-02-12 PROCEDURE — 3078F DIAST BP <80 MM HG: CPT | Performed by: INTERNAL MEDICINE

## 2022-02-12 PROCEDURE — 3074F SYST BP LT 130 MM HG: CPT | Performed by: INTERNAL MEDICINE

## 2022-02-12 PROCEDURE — 99214 OFFICE O/P EST MOD 30 MIN: CPT | Performed by: INTERNAL MEDICINE

## 2022-02-12 PROCEDURE — 3008F BODY MASS INDEX DOCD: CPT | Performed by: INTERNAL MEDICINE

## 2022-02-14 ENCOUNTER — TELEPHONE (OUTPATIENT)
Dept: PAIN CLINIC | Facility: CLINIC | Age: 57
End: 2022-02-14

## 2022-02-14 ENCOUNTER — MED REC SCAN ONLY (OUTPATIENT)
Dept: INTERNAL MEDICINE CLINIC | Facility: CLINIC | Age: 57
End: 2022-02-14

## 2022-02-14 RX ORDER — DICLOFENAC SODIUM 75 MG/1
75 TABLET, DELAYED RELEASE ORAL EVERY 12 HOURS PRN
Qty: 60 TABLET | Refills: 2 | Status: SHIPPED | OUTPATIENT
Start: 2022-02-14 | End: 2022-05-15

## 2022-02-15 ENCOUNTER — TELEPHONE (OUTPATIENT)
Dept: PAIN CLINIC | Facility: CLINIC | Age: 57
End: 2022-02-15

## 2022-02-15 RX ORDER — BACLOFEN 10 MG/1
10 TABLET ORAL EVERY 8 HOURS PRN
Qty: 90 TABLET | Refills: 0 | Status: SHIPPED | OUTPATIENT
Start: 2022-02-15 | End: 2022-03-17

## 2022-02-15 NOTE — TELEPHONE ENCOUNTER
Called pt to schedule med review. Pt requesting script be cx with pharmacy. Pt state he did not initiate refill request stated \" [facility] is going crazy with refills. ''

## 2022-02-15 NOTE — TELEPHONE ENCOUNTER
Called pt to schedule med review. Pt states he has more than enough baclofen at this time. Pt requesting script be cx with pharmacy. Pt state he did not initiate refill request stated \" [facility] is going [facility] with refills. ''

## 2022-02-15 NOTE — TELEPHONE ENCOUNTER
Medication: BACLOFEN 10 MG Oral Tab    Date of last refill: 01/20/22      Last office visit: 09/24/21  Due back to clinic per last office note:  6 months  Date next office visit scheduled:  none        Last OV note recommendation:   Medical Decision Making:   Diagnosis:    Lumbar facet arthropathy  (primary encounter diagnosis)  Lumbosacral spondylosis without myelopathy  Impression:   Had a recent flare that resolve with current meds/did not require medrol dose naldo/is stable on diclofenac 75mg bid and baclofen 10mg tid that we prescribe for his back pain/and has not needed injection therapy and feels well overall. Continues working with neuro for migraine HA and endocrine   No change in meds and since patient is not on controlled substance d/w patient f/u every 6 months for med refill or sooner if injx therapy needed or changes in pain  Plan:   >continue diclofenac 75mg bid and baclofen 10mg tid  >follow up every 6 months since non opioids are being prescribed  >patient may follow up sooner than 6 months if pain symptoms worsen and he requires intervention/injections  No orders of the defined types were placed in this encounter. No

## 2022-02-16 RX ORDER — ATENOLOL 25 MG/1
TABLET ORAL
Qty: 180 TABLET | Refills: 1 | OUTPATIENT
Start: 2022-02-16

## 2022-03-08 RX ORDER — LOSARTAN POTASSIUM 25 MG/1
TABLET ORAL
Qty: 90 TABLET | Refills: 0 | Status: SHIPPED | OUTPATIENT
Start: 2022-03-08

## 2022-03-09 ENCOUNTER — TELEPHONE (OUTPATIENT)
Dept: INTERNAL MEDICINE CLINIC | Facility: CLINIC | Age: 57
End: 2022-03-09

## 2022-03-09 DIAGNOSIS — Z12.11 COLON CANCER SCREENING: Primary | ICD-10-CM

## 2022-03-14 RX ORDER — ATENOLOL 25 MG/1
TABLET ORAL
Qty: 180 TABLET | Refills: 1 | Status: SHIPPED | OUTPATIENT
Start: 2022-03-14

## 2022-03-29 ENCOUNTER — LAB ENCOUNTER (OUTPATIENT)
Dept: LAB | Age: 57
End: 2022-03-29
Attending: INTERNAL MEDICINE
Payer: COMMERCIAL

## 2022-03-29 DIAGNOSIS — Z12.5 SCREENING FOR PROSTATE CANCER: ICD-10-CM

## 2022-03-29 DIAGNOSIS — Z00.00 PHYSICAL EXAM, ANNUAL: ICD-10-CM

## 2022-03-29 LAB
ALBUMIN SERPL-MCNC: 4 G/DL (ref 3.4–5)
ALBUMIN/GLOB SERPL: 1.3 {RATIO} (ref 1–2)
ALP LIVER SERPL-CCNC: 94 U/L
ALT SERPL-CCNC: 37 U/L
ANION GAP SERPL CALC-SCNC: 6 MMOL/L (ref 0–18)
AST SERPL-CCNC: 22 U/L (ref 15–37)
BASOPHILS # BLD AUTO: 0.06 X10(3) UL (ref 0–0.2)
BASOPHILS NFR BLD AUTO: 0.9 %
BILIRUB SERPL-MCNC: 0.6 MG/DL (ref 0.1–2)
BUN BLD-MCNC: 16 MG/DL (ref 7–18)
CALCIUM BLD-MCNC: 9.3 MG/DL (ref 8.5–10.1)
CHLORIDE SERPL-SCNC: 106 MMOL/L (ref 98–112)
CHOLEST SERPL-MCNC: 230 MG/DL (ref ?–200)
CO2 SERPL-SCNC: 29 MMOL/L (ref 21–32)
COMPLEXED PSA SERPL-MCNC: 1.57 NG/ML (ref ?–4)
CREAT BLD-MCNC: 0.96 MG/DL
EOSINOPHIL # BLD AUTO: 0.19 X10(3) UL (ref 0–0.7)
EOSINOPHIL NFR BLD AUTO: 2.9 %
ERYTHROCYTE [DISTWIDTH] IN BLOOD BY AUTOMATED COUNT: 12.3 %
EST. AVERAGE GLUCOSE BLD GHB EST-MCNC: 114 MG/DL (ref 68–126)
FASTING STATUS PATIENT QL REPORTED: YES
GLOBULIN PLAS-MCNC: 3.1 G/DL (ref 2.8–4.4)
GLUCOSE BLD-MCNC: 120 MG/DL (ref 70–99)
HBA1C MFR BLD: 5.6 % (ref ?–5.7)
HCT VFR BLD AUTO: 47.6 %
HDLC SERPL-MCNC: 35 MG/DL (ref 40–59)
HGB BLD-MCNC: 15.5 G/DL
IMM GRANULOCYTES # BLD AUTO: 0.01 X10(3) UL (ref 0–1)
IMM GRANULOCYTES NFR BLD: 0.2 %
LDLC SERPL CALC-MCNC: 149 MG/DL (ref ?–100)
LYMPHOCYTES # BLD AUTO: 1.74 X10(3) UL (ref 1–4)
LYMPHOCYTES NFR BLD AUTO: 26.2 %
MCH RBC QN AUTO: 31.2 PG (ref 26–34)
MCHC RBC AUTO-ENTMCNC: 32.6 G/DL (ref 31–37)
MCV RBC AUTO: 95.8 FL
MONOCYTES NFR BLD AUTO: 8.3 %
NEUTROPHILS # BLD AUTO: 4.1 X10 (3) UL (ref 1.5–7.7)
NEUTROPHILS # BLD AUTO: 4.1 X10(3) UL (ref 1.5–7.7)
NEUTROPHILS NFR BLD AUTO: 61.5 %
NONHDLC SERPL-MCNC: 195 MG/DL (ref ?–130)
OSMOLALITY SERPL CALC.SUM OF ELEC: 294 MOSM/KG (ref 275–295)
PLATELET # BLD AUTO: 218 10(3)UL (ref 150–450)
PROT SERPL-MCNC: 7.1 G/DL (ref 6.4–8.2)
RBC # BLD AUTO: 4.97 X10(6)UL
SODIUM SERPL-SCNC: 141 MMOL/L (ref 136–145)
TRIGL SERPL-MCNC: 251 MG/DL (ref 30–149)
TSI SER-ACNC: 2.41 MIU/ML (ref 0.36–3.74)
VLDLC SERPL CALC-MCNC: 48 MG/DL (ref 0–30)
WBC # BLD AUTO: 6.7 X10(3) UL (ref 4–11)

## 2022-03-29 PROCEDURE — 84153 ASSAY OF PSA TOTAL: CPT | Performed by: INTERNAL MEDICINE

## 2022-03-29 PROCEDURE — 83036 HEMOGLOBIN GLYCOSYLATED A1C: CPT | Performed by: INTERNAL MEDICINE

## 2022-03-29 PROCEDURE — 80061 LIPID PANEL: CPT | Performed by: INTERNAL MEDICINE

## 2022-03-29 PROCEDURE — 80050 GENERAL HEALTH PANEL: CPT | Performed by: INTERNAL MEDICINE

## 2022-04-28 ENCOUNTER — HOSPITAL ENCOUNTER (OUTPATIENT)
Age: 57
Discharge: HOME | End: 2022-04-28
Payer: COMMERCIAL

## 2022-04-28 VITALS
HEIGHT: 71 IN | WEIGHT: 294 LBS | OXYGEN SATURATION: 95 % | HEART RATE: 59 BPM | BODY MASS INDEX: 41.16 KG/M2 | TEMPERATURE: 98 F | SYSTOLIC BLOOD PRESSURE: 143 MMHG | RESPIRATION RATE: 18 BRPM | DIASTOLIC BLOOD PRESSURE: 80 MMHG

## 2022-04-28 DIAGNOSIS — H57.89 EYE IRRITATION: Primary | ICD-10-CM

## 2022-04-28 PROCEDURE — 99213 OFFICE O/P EST LOW 20 MIN: CPT

## 2022-04-28 RX ORDER — TETRACAINE HYDROCHLORIDE 5 MG/ML
1 SOLUTION OPHTHALMIC ONCE
Status: COMPLETED | OUTPATIENT
Start: 2022-04-28 | End: 2022-04-28

## 2022-04-28 RX ORDER — ERYTHROMYCIN 5 MG/G
1 OINTMENT OPHTHALMIC 3 TIMES DAILY
Qty: 1 G | Refills: 0 | Status: SHIPPED | OUTPATIENT
Start: 2022-04-28 | End: 2022-05-05

## 2022-04-28 NOTE — ED INITIAL ASSESSMENT (HPI)
Right eye- irritation, tearing , pain  and crusting  Started last week denies injury. pt has been flushing eye but still feels irritated

## 2022-05-24 DIAGNOSIS — M46.1 SACROILIAC INFLAMMATION (HCC): ICD-10-CM

## 2022-05-24 RX ORDER — DICLOFENAC SODIUM 75 MG/1
75 TABLET, DELAYED RELEASE ORAL EVERY 12 HOURS PRN
Qty: 60 TABLET | Refills: 2 | Status: SHIPPED | OUTPATIENT
Start: 2022-05-24 | End: 2022-08-22

## 2022-05-24 NOTE — TELEPHONE ENCOUNTER
**Spoke with patient, he will call back to schedule he has to discuss transportation with his wife. Medication: DICLOFENAC 75 MG Oral Tab EC     Date of last refill: 02/14/22 x2    Last office visit: 09/24/21  Due back to clinic per last office note:  6 months  Date next office visit scheduled: Will call back      Last OV note recommendation:   Medical Decision Making:   Diagnosis:    Lumbar facet arthropathy  (primary encounter diagnosis)  Lumbosacral spondylosis without myelopathy  Impression:   Had a recent flare that resolve with current meds/did not require medrol dose naldo/is stable on diclofenac 75mg bid and baclofen 10mg tid that we prescribe for his back pain/and has not needed injection therapy and feels well overall.    Continues working with neuro for migraine HA and endocrine   No change in meds and since patient is not on controlled substance d/w patient f/u every 6 months for med refill or sooner if injx therapy needed or changes in pain  Plan:   >continue diclofenac 75mg bid and baclofen 10mg tid  >follow up every 6 months since non opioids are being prescribed  >patient may follow up sooner than 6 months if pain symptoms worsen and he requires intervention/injections  No orders of the defined types were placed in this encounter

## 2022-06-09 DIAGNOSIS — I10 ESSENTIAL HYPERTENSION: ICD-10-CM

## 2022-06-09 RX ORDER — LOSARTAN POTASSIUM 25 MG/1
TABLET ORAL
Qty: 30 TABLET | Refills: 0 | Status: SHIPPED | OUTPATIENT
Start: 2022-06-09

## 2022-06-25 DIAGNOSIS — E78.00 HIGH CHOLESTEROL: ICD-10-CM

## 2022-06-27 RX ORDER — SIMVASTATIN 20 MG
TABLET ORAL
Qty: 30 TABLET | Refills: 0 | Status: SHIPPED | OUTPATIENT
Start: 2022-06-27

## 2022-06-27 NOTE — TELEPHONE ENCOUNTER
Last OV relevant to medication: 02/12/2022  Last refill date: 03/30/2022    #/refills: 90-0  When pt was asked to return for OV: Return in about 4 months (around 6/12/2022) for physical  Upcoming appt/reason: n/a Pt stated he will speak to his wife to see when she can bring him  Was pt informed of any over due labs: n/a  30-0 pended below for provider approval  Lab Results   Component Value Date    CHOLEST 230 (H) 03/29/2022    TRIG 251 (H) 03/29/2022    HDL 35 (L) 03/29/2022     (H) 03/29/2022    VLDL 48 (H) 03/29/2022    TCHDLRATIO 5.75 (H) 02/14/2017    [place] 195 (H) 03/29/2022

## 2022-07-07 DIAGNOSIS — I10 ESSENTIAL HYPERTENSION: ICD-10-CM

## 2022-07-07 RX ORDER — LOSARTAN POTASSIUM 25 MG/1
TABLET ORAL
Qty: 30 TABLET | Refills: 0 | Status: SHIPPED | OUTPATIENT
Start: 2022-07-07

## 2022-07-07 NOTE — TELEPHONE ENCOUNTER
Last OV relevant to medication:2/12/2022  Last refill date: 6/9/2022    #/refills: 30-0 (one off)  When pt was asked to return for OV: Return in about 4 months (around 6/12/2022) for physical  Upcoming appt/reason: n/a, LMTCB to schedule PE.  Iwebalizehart messages previously sent  Was pt informed of any over due labs: n/a  Lab Results   Component Value Date     (H) 03/29/2022    BUN 16 03/29/2022    BUNCREA 20.0 06/23/2021    CREATSERUM 0.96 03/29/2022    ANIONGAP 6 03/29/2022    GFR 97 11/18/2016    GFRNAA 89 03/29/2022    GFRAA 103 03/29/2022    CA 9.3 03/29/2022    OSMOCALC 294 03/29/2022    ALKPHO 94 03/29/2022    AST 22 03/29/2022    ALT 37 03/29/2022    BILT 0.6 03/29/2022    TP 7.1 03/29/2022    ALB 4.0 03/29/2022    GLOBULIN 3.1 03/29/2022     03/29/2022    K 4.1 03/29/2022     03/29/2022    CO2 29.0 03/29/2022

## 2022-07-19 ENCOUNTER — TELEPHONE (OUTPATIENT)
Dept: INTERNAL MEDICINE CLINIC | Facility: CLINIC | Age: 57
End: 2022-07-19

## 2022-07-22 DIAGNOSIS — E78.00 HIGH CHOLESTEROL: ICD-10-CM

## 2022-07-22 RX ORDER — SIMVASTATIN 20 MG
TABLET ORAL
Qty: 30 TABLET | Refills: 0 | Status: SHIPPED | OUTPATIENT
Start: 2022-07-22

## 2022-07-25 ENCOUNTER — OFFICE VISIT (OUTPATIENT)
Dept: PAIN CLINIC | Facility: CLINIC | Age: 57
End: 2022-07-25
Payer: COMMERCIAL

## 2022-07-25 VITALS
BODY MASS INDEX: 41 KG/M2 | OXYGEN SATURATION: 94 % | DIASTOLIC BLOOD PRESSURE: 86 MMHG | HEART RATE: 50 BPM | SYSTOLIC BLOOD PRESSURE: 132 MMHG | WEIGHT: 294 LBS

## 2022-07-25 DIAGNOSIS — M46.1 SACROILIITIS (HCC): Primary | ICD-10-CM

## 2022-07-25 DIAGNOSIS — M47.817 LUMBOSACRAL SPONDYLOSIS WITHOUT MYELOPATHY: ICD-10-CM

## 2022-07-25 DIAGNOSIS — M47.816 FACET ARTHROPATHY, LUMBAR: ICD-10-CM

## 2022-07-25 NOTE — PROGRESS NOTES
Patient presents in office today with reported pain in low back    Current pain level reported = 1/10    Last reported dose of diclofenac and baclofen today      Narcotic Contract renewal none    Urine Drug screen none

## 2022-07-27 ENCOUNTER — TELEPHONE (OUTPATIENT)
Dept: INTERNAL MEDICINE CLINIC | Facility: CLINIC | Age: 57
End: 2022-07-27

## 2022-07-29 DIAGNOSIS — I10 ESSENTIAL HYPERTENSION: ICD-10-CM

## 2022-07-29 RX ORDER — LOSARTAN POTASSIUM 25 MG/1
TABLET ORAL
Qty: 30 TABLET | Refills: 0 | Status: SHIPPED | OUTPATIENT
Start: 2022-07-29

## 2022-07-29 NOTE — TELEPHONE ENCOUNTER
Last OV relevant to medication:2/12/2022  Last refill date: 7/7/2022    #/refills: 30-0   When pt was asked to return for OV: Return in about 4 months (around 6/12/2022) for physical  Upcoming appt/reason: 08/22/2022  Was pt informed of any over due labs: n/a  Lab Results   Component Value Date     (H) 03/29/2022    BUN 16 03/29/2022    BUNCREA 20.0 06/23/2021    CREATSERUM 0.96 03/29/2022    ANIONGAP 6 03/29/2022    GFR 97 11/18/2016    GFRNAA 89 03/29/2022    GFRAA 103 03/29/2022    CA 9.3 03/29/2022    OSMOCALC 294 03/29/2022    ALKPHO 94 03/29/2022    AST 22 03/29/2022    ALT 37 03/29/2022    BILT 0.6 03/29/2022    TP 7.1 03/29/2022    ALB 4.0 03/29/2022    GLOBULIN 3.1 03/29/2022     03/29/2022    K 4.1 03/29/2022     03/29/2022    CO2 29.0 03/29/2022

## 2022-07-30 RX ORDER — SIMVASTATIN 20 MG
20 TABLET ORAL NIGHTLY
Qty: 30 TABLET | Refills: 11 | OUTPATIENT
Start: 2022-07-30

## 2022-08-18 ENCOUNTER — LAB ENCOUNTER (OUTPATIENT)
Dept: LAB | Age: 57
End: 2022-08-18
Attending: INTERNAL MEDICINE
Payer: COMMERCIAL

## 2022-08-18 DIAGNOSIS — I10 ESSENTIAL HYPERTENSION: ICD-10-CM

## 2022-08-18 DIAGNOSIS — Z12.11 COLON CANCER SCREENING: ICD-10-CM

## 2022-08-18 PROCEDURE — 82274 ASSAY TEST FOR BLOOD FECAL: CPT | Performed by: INTERNAL MEDICINE

## 2022-08-18 RX ORDER — ATENOLOL 25 MG/1
TABLET ORAL
Qty: 180 TABLET | Refills: 1 | OUTPATIENT
Start: 2022-08-18

## 2022-08-19 LAB — HEMOCCULT STL QL: NEGATIVE

## 2022-08-22 ENCOUNTER — OFFICE VISIT (OUTPATIENT)
Dept: INTERNAL MEDICINE CLINIC | Facility: CLINIC | Age: 57
End: 2022-08-22
Payer: COMMERCIAL

## 2022-08-22 VITALS
TEMPERATURE: 98 F | SYSTOLIC BLOOD PRESSURE: 160 MMHG | HEART RATE: 64 BPM | RESPIRATION RATE: 12 BRPM | WEIGHT: 290.63 LBS | HEIGHT: 71 IN | DIASTOLIC BLOOD PRESSURE: 82 MMHG | BODY MASS INDEX: 40.69 KG/M2

## 2022-08-22 DIAGNOSIS — Z12.11 SCREENING FOR COLON CANCER: Primary | ICD-10-CM

## 2022-08-22 DIAGNOSIS — E78.00 HIGH CHOLESTEROL: ICD-10-CM

## 2022-08-22 DIAGNOSIS — Z00.00 PHYSICAL EXAM, ANNUAL: ICD-10-CM

## 2022-08-22 DIAGNOSIS — R73.01 IMPAIRED FASTING GLUCOSE: ICD-10-CM

## 2022-08-22 DIAGNOSIS — I10 ESSENTIAL HYPERTENSION: ICD-10-CM

## 2022-08-22 PROCEDURE — 3077F SYST BP >= 140 MM HG: CPT | Performed by: INTERNAL MEDICINE

## 2022-08-22 PROCEDURE — 99396 PREV VISIT EST AGE 40-64: CPT | Performed by: INTERNAL MEDICINE

## 2022-08-22 PROCEDURE — 99214 OFFICE O/P EST MOD 30 MIN: CPT | Performed by: INTERNAL MEDICINE

## 2022-08-22 PROCEDURE — 3008F BODY MASS INDEX DOCD: CPT | Performed by: INTERNAL MEDICINE

## 2022-08-22 PROCEDURE — 3079F DIAST BP 80-89 MM HG: CPT | Performed by: INTERNAL MEDICINE

## 2022-08-22 RX ORDER — SIMVASTATIN 20 MG
TABLET ORAL
Qty: 90 TABLET | Refills: 0 | Status: SHIPPED | OUTPATIENT
Start: 2022-08-22 | End: 2022-11-18

## 2022-08-22 RX ORDER — LOSARTAN POTASSIUM 25 MG/1
TABLET ORAL
Qty: 30 TABLET | Refills: 0 | OUTPATIENT
Start: 2022-08-22

## 2022-08-22 RX ORDER — LOSARTAN POTASSIUM 50 MG/1
25 TABLET ORAL DAILY
Qty: 90 TABLET | Refills: 0 | Status: SHIPPED | OUTPATIENT
Start: 2022-08-22

## 2022-08-22 NOTE — TELEPHONE ENCOUNTER
Last OV relevant to medication: 2-12-22  Last refill date: 7-22-22     #/refills: 30-0  When pt was asked to return for OV: 4 months for PE  Upcoming appt/reason: today 8-  Was pt informed of any over due labs: n.a  Lab Results   Component Value Date    CHOLEST 230 (H) 03/29/2022    TRIG 251 (H) 03/29/2022    HDL 35 (L) 03/29/2022     (H) 03/29/2022    VLDL 48 (H) 03/29/2022    TCHDLRATIO 5.75 (H) 02/14/2017    NONHDLC 195 (H) 03/29/2022

## 2022-08-22 NOTE — TELEPHONE ENCOUNTER
Last OV relevant to medication: 2-12-22  Last refill date: 7-29-22     #/refills: 30-0  When pt was asked to return for OV: 4 months for PE  Upcoming appt/reason: today 8-  Was pt informed of any over due labs: n.a  Lab Results   Component Value Date     (H) 03/29/2022    BUN 16 03/29/2022    BUNCREA 20.0 06/23/2021    CREATSERUM 0.96 03/29/2022    ANIONGAP 6 03/29/2022    GFR 97 11/18/2016    GFRNAA 89 03/29/2022    GFRAA 103 03/29/2022    CA 9.3 03/29/2022    OSMOCALC 294 03/29/2022    ALKPHO 94 03/29/2022    AST 22 03/29/2022    ALT 37 03/29/2022    BILT 0.6 03/29/2022    TP 7.1 03/29/2022    ALB 4.0 03/29/2022    GLOBULIN 3.1 03/29/2022     03/29/2022    K 4.1 03/29/2022     03/29/2022    CO2 29.0 03/29/2022

## 2022-08-26 DIAGNOSIS — M46.1 SACROILIAC INFLAMMATION (HCC): ICD-10-CM

## 2022-08-26 RX ORDER — DICLOFENAC SODIUM 75 MG/1
75 TABLET, DELAYED RELEASE ORAL EVERY 12 HOURS PRN
Qty: 60 TABLET | Refills: 2 | Status: SHIPPED | OUTPATIENT
Start: 2022-08-26 | End: 2022-11-24

## 2022-08-26 NOTE — TELEPHONE ENCOUNTER
Medication: DICLOFENAC 75 MG     Date of last refill: 05/24/22 x2      Last office visit: 07/25/22  Due back to clinic per last office note:  6 months  Date next office visit scheduled:  01/23/23      Last OV note recommendation: Plan:   >continue diclofenac 75mg bid and baclofen 10mg twice twice daily  >follow up every 6 months since non opioids are being prescribed  >patient may follow up sooner than 6 months if pain symptoms worsen and he requires intervention/injections  No orders of the defined types were placed in this encounter.

## 2022-09-28 ENCOUNTER — PATIENT MESSAGE (OUTPATIENT)
Dept: ENDOCRINOLOGY CLINIC | Facility: CLINIC | Age: 57
End: 2022-09-28

## 2022-09-29 NOTE — TELEPHONE ENCOUNTER
I understand his concerns, however, I am not very well versed with the various conditions listed.    I recommend checking in with a tertiary [facility] center like [facility] or [facility] to get a more in depth evaluation   Thanks

## 2022-09-29 NOTE — TELEPHONE ENCOUNTER
Dr Lashae Ace,    Pt last seen 1/2021 for gynecomastia and advised surgical therapy.     No upcoming appt scheduled -- would you like to have him call/schedule next available or add him on?

## 2022-11-01 DIAGNOSIS — I10 ESSENTIAL HYPERTENSION: ICD-10-CM

## 2022-11-02 RX ORDER — LOSARTAN POTASSIUM 25 MG/1
TABLET ORAL
Qty: 30 TABLET | Refills: 0 | OUTPATIENT
Start: 2022-11-02

## 2022-11-18 DIAGNOSIS — E78.00 HIGH CHOLESTEROL: ICD-10-CM

## 2022-11-18 RX ORDER — SIMVASTATIN 20 MG
TABLET ORAL
Qty: 90 TABLET | Refills: 0 | Status: SHIPPED | OUTPATIENT
Start: 2022-11-18

## 2022-12-06 DIAGNOSIS — M46.1 SACROILIAC INFLAMMATION (HCC): ICD-10-CM

## 2022-12-06 RX ORDER — DICLOFENAC SODIUM 75 MG/1
TABLET, DELAYED RELEASE ORAL
Qty: 60 TABLET | Refills: 2 | Status: SHIPPED | OUTPATIENT
Start: 2022-12-06

## 2022-12-08 DIAGNOSIS — I10 ESSENTIAL HYPERTENSION: ICD-10-CM

## 2022-12-09 RX ORDER — LOSARTAN POTASSIUM 25 MG/1
TABLET ORAL
Qty: 30 TABLET | Refills: 0 | Status: SHIPPED | OUTPATIENT
Start: 2022-12-09

## 2023-01-10 DIAGNOSIS — I10 ESSENTIAL HYPERTENSION: ICD-10-CM

## 2023-01-12 RX ORDER — ATENOLOL 25 MG/1
TABLET ORAL
Qty: 180 TABLET | Refills: 0 | Status: SHIPPED | OUTPATIENT
Start: 2023-01-12

## 2023-01-13 DIAGNOSIS — I10 ESSENTIAL HYPERTENSION: ICD-10-CM

## 2023-01-16 RX ORDER — LOSARTAN POTASSIUM 25 MG/1
TABLET ORAL
Qty: 30 TABLET | Refills: 0 | Status: SHIPPED | OUTPATIENT
Start: 2023-01-16

## 2023-01-16 NOTE — TELEPHONE ENCOUNTER
30 day supply sent per protocol   Patient has 3 month follow-up appointment scheduled with Dr. Natalia Washington 2/7/23   Patient is due to complete lab prior to appointment, sent [employer] message to remind him

## 2023-02-14 DIAGNOSIS — E78.00 HIGH CHOLESTEROL: ICD-10-CM

## 2023-02-15 RX ORDER — SIMVASTATIN 20 MG
TABLET ORAL
Qty: 90 TABLET | Refills: 0 | Status: SHIPPED | OUTPATIENT
Start: 2023-02-15

## 2023-02-15 NOTE — TELEPHONE ENCOUNTER
Cholesterol Medication Protocol Passed 02/14/2023 12:35 AM   Protocol Details  ALT < 80   ALT resulted within past year   Lipid panel within past 12 months   Appointment within past 12 or next 3 months

## 2023-02-20 ENCOUNTER — OFFICE VISIT (OUTPATIENT)
Dept: PAIN CLINIC | Facility: CLINIC | Age: 58
End: 2023-02-20
Payer: COMMERCIAL

## 2023-02-20 VITALS — OXYGEN SATURATION: 97 % | HEART RATE: 61 BPM | DIASTOLIC BLOOD PRESSURE: 80 MMHG | SYSTOLIC BLOOD PRESSURE: 120 MMHG

## 2023-02-20 DIAGNOSIS — M47.816 LUMBAR FACET ARTHROPATHY: ICD-10-CM

## 2023-02-20 DIAGNOSIS — M47.817 LUMBOSACRAL SPONDYLOSIS WITHOUT MYELOPATHY: Primary | ICD-10-CM

## 2023-02-20 PROCEDURE — 3074F SYST BP LT 130 MM HG: CPT | Performed by: NURSE PRACTITIONER

## 2023-02-20 PROCEDURE — 3079F DIAST BP 80-89 MM HG: CPT | Performed by: NURSE PRACTITIONER

## 2023-02-20 PROCEDURE — 99214 OFFICE O/P EST MOD 30 MIN: CPT | Performed by: NURSE PRACTITIONER

## 2023-02-20 RX ORDER — GALCANEZUMAB 120 MG/ML
120 INJECTION, SOLUTION SUBCUTANEOUS
COMMUNITY
Start: 2022-11-09

## 2023-02-20 RX ORDER — GALCANEZUMAB 120 MG/ML
INJECTION, SOLUTION SUBCUTANEOUS
COMMUNITY
Start: 2023-02-08

## 2023-02-20 RX ORDER — BACLOFEN 10 MG/1
10 TABLET ORAL 2 TIMES DAILY
Qty: 60 TABLET | Refills: 0 | Status: SHIPPED | OUTPATIENT
Start: 2023-02-20

## 2023-02-20 RX ORDER — SERTRALINE HYDROCHLORIDE 100 MG/1
100 TABLET, FILM COATED ORAL DAILY
COMMUNITY
Start: 2022-12-22

## 2023-02-20 NOTE — PROGRESS NOTES
Patient presents in office today with reported pain in lower back    Current pain level reported = 3/10    Last reported dose of diclofenac      Narcotic Contract renewal n/a    Urine Drug screen n/a

## 2023-03-11 DIAGNOSIS — M46.1 SACROILIAC INFLAMMATION (HCC): ICD-10-CM

## 2023-03-13 RX ORDER — DICLOFENAC SODIUM 75 MG/1
TABLET, DELAYED RELEASE ORAL
Qty: 60 TABLET | Refills: 2 | Status: SHIPPED | OUTPATIENT
Start: 2023-03-13

## 2023-03-13 NOTE — TELEPHONE ENCOUNTER
Medication: DICLOFENAC 75 MG Oral Tab EC    Date of last refill: 12/6/22      Last office visit: 2/20/23  Due back to clinic per last office note:  6 months  Date next office visit scheduled:  none      Last OV note recommendation:   Impression:   Patient is doing well with the nsaids for his back pain/he stopped his gralise/lyrica and baclofen due to inability to obtain this/he feels much more cognitively clear without the meds/he is feeling that the flexeril was helpful for his back spasms and he also felt that one dose of baclofen 10mg daily for his back pain. Patient tolerating diclofenac without bleeding issues/also while using the sertraline. He otherwise feels stable. After exam and d/w patient we developed the following plan. He was reminded he has not had lumbar MBB nor RFA to lumbar MB since 2019 therefore if lumbar pain progresses without radicular sx we would consider repeating these blocks and RFA. He is aware and will contact office prn for injections/but f/u for med eval every 6 months/he prefers to continue receiving his nsaid/muscle relaxants from our office  Plan:   >baclofen 10mg bid prn for muscle spasms  >continue diclofenac 75mg bid : monitor for bleeding: call for refill/does not need today  >return to clinic every 6 months for med review/otherwise sooner if back pain flares to consider Lumbar MBB and repeat RFA.

## 2023-03-20 ENCOUNTER — LAB ENCOUNTER (OUTPATIENT)
Dept: LAB | Age: 58
End: 2023-03-20
Attending: INTERNAL MEDICINE
Payer: COMMERCIAL

## 2023-03-20 DIAGNOSIS — E78.00 HIGH CHOLESTEROL: ICD-10-CM

## 2023-03-20 DIAGNOSIS — Z00.00 PHYSICAL EXAM, ANNUAL: ICD-10-CM

## 2023-03-20 DIAGNOSIS — R73.01 IMPAIRED FASTING GLUCOSE: ICD-10-CM

## 2023-03-20 LAB
ALBUMIN SERPL-MCNC: 4 G/DL (ref 3.4–5)
ALBUMIN/GLOB SERPL: 1.3 {RATIO} (ref 1–2)
ALP LIVER SERPL-CCNC: 96 U/L
ALT SERPL-CCNC: 28 U/L
ANION GAP SERPL CALC-SCNC: 4 MMOL/L (ref 0–18)
AST SERPL-CCNC: 16 U/L (ref 15–37)
BILIRUB SERPL-MCNC: 0.5 MG/DL (ref 0.1–2)
BUN BLD-MCNC: 19 MG/DL (ref 7–18)
CALCIUM BLD-MCNC: 9.2 MG/DL (ref 8.5–10.1)
CHLORIDE SERPL-SCNC: 108 MMOL/L (ref 98–112)
CHOLEST SERPL-MCNC: 176 MG/DL (ref ?–200)
CO2 SERPL-SCNC: 29 MMOL/L (ref 21–32)
CREAT BLD-MCNC: 1.05 MG/DL
EST. AVERAGE GLUCOSE BLD GHB EST-MCNC: 117 MG/DL (ref 68–126)
FASTING PATIENT LIPID ANSWER: YES
FASTING STATUS PATIENT QL REPORTED: YES
GFR SERPLBLD BASED ON 1.73 SQ M-ARVRAT: 84 ML/MIN/1.73M2 (ref 60–?)
GLOBULIN PLAS-MCNC: 3 G/DL (ref 2.8–4.4)
GLUCOSE BLD-MCNC: 107 MG/DL (ref 70–99)
HBA1C MFR BLD: 5.7 % (ref ?–5.7)
HDLC SERPL-MCNC: 39 MG/DL (ref 40–59)
LDLC SERPL CALC-MCNC: 110 MG/DL (ref ?–100)
NONHDLC SERPL-MCNC: 137 MG/DL (ref ?–130)
OSMOLALITY SERPL CALC.SUM OF ELEC: 295 MOSM/KG (ref 275–295)
POTASSIUM SERPL-SCNC: 4.3 MMOL/L (ref 3.5–5.1)
PROT SERPL-MCNC: 7 G/DL (ref 6.4–8.2)
SODIUM SERPL-SCNC: 141 MMOL/L (ref 136–145)
TRIGL SERPL-MCNC: 151 MG/DL (ref 30–149)
VLDLC SERPL CALC-MCNC: 26 MG/DL (ref 0–30)

## 2023-03-20 PROCEDURE — 83036 HEMOGLOBIN GLYCOSYLATED A1C: CPT | Performed by: INTERNAL MEDICINE

## 2023-03-20 PROCEDURE — 80061 LIPID PANEL: CPT | Performed by: INTERNAL MEDICINE

## 2023-03-20 PROCEDURE — 80053 COMPREHEN METABOLIC PANEL: CPT | Performed by: INTERNAL MEDICINE

## 2023-03-23 ENCOUNTER — OFFICE VISIT (OUTPATIENT)
Dept: INTERNAL MEDICINE CLINIC | Facility: CLINIC | Age: 58
End: 2023-03-23
Payer: COMMERCIAL

## 2023-03-23 VITALS
TEMPERATURE: 98 F | HEART RATE: 54 BPM | WEIGHT: 290 LBS | DIASTOLIC BLOOD PRESSURE: 80 MMHG | BODY MASS INDEX: 40.6 KG/M2 | SYSTOLIC BLOOD PRESSURE: 124 MMHG | RESPIRATION RATE: 20 BRPM | HEIGHT: 71 IN

## 2023-03-23 DIAGNOSIS — R73.01 IMPAIRED FASTING GLUCOSE: ICD-10-CM

## 2023-03-23 DIAGNOSIS — E78.00 HIGH CHOLESTEROL: ICD-10-CM

## 2023-03-23 DIAGNOSIS — I10 ESSENTIAL HYPERTENSION: Primary | ICD-10-CM

## 2023-03-23 DIAGNOSIS — Z00.00 PHYSICAL EXAM, ANNUAL: ICD-10-CM

## 2023-03-23 PROCEDURE — 99214 OFFICE O/P EST MOD 30 MIN: CPT | Performed by: INTERNAL MEDICINE

## 2023-03-23 PROCEDURE — 3074F SYST BP LT 130 MM HG: CPT | Performed by: INTERNAL MEDICINE

## 2023-03-23 PROCEDURE — 3008F BODY MASS INDEX DOCD: CPT | Performed by: INTERNAL MEDICINE

## 2023-03-23 PROCEDURE — 3079F DIAST BP 80-89 MM HG: CPT | Performed by: INTERNAL MEDICINE

## 2023-03-27 DIAGNOSIS — I10 ESSENTIAL HYPERTENSION: ICD-10-CM

## 2023-03-27 RX ORDER — LOSARTAN POTASSIUM 25 MG/1
TABLET ORAL
Qty: 30 TABLET | Refills: 2 | Status: SHIPPED | OUTPATIENT
Start: 2023-03-27

## 2023-03-27 RX ORDER — ATENOLOL 25 MG/1
TABLET ORAL
Qty: 180 TABLET | Refills: 1 | Status: SHIPPED | OUTPATIENT
Start: 2023-03-27

## 2023-03-27 RX ORDER — BACLOFEN 10 MG/1
TABLET ORAL
Qty: 60 TABLET | Refills: 0 | Status: SHIPPED | OUTPATIENT
Start: 2023-03-27

## 2023-03-27 NOTE — TELEPHONE ENCOUNTER
Medication: baclofen 10 MG Oral Tab    Date of last refill: 02/20/23      Last office visit: 02/20/23  Due back to clinic per last office note:  6 months   Date next office visit scheduled:  na        Last OV note recommendation:       Plan:   >baclofen 10mg bid prn for muscle spasms  >continue diclofenac 75mg bid : monitor for bleeding: call for refill/does not need today  >return to clinic every 6 months for med review/otherwise sooner if back pain flares to consider Lumbar MBB and repeat RFA.

## 2023-04-21 RX ORDER — BACLOFEN 10 MG/1
TABLET ORAL
Qty: 60 TABLET | Refills: 0 | Status: SHIPPED | OUTPATIENT
Start: 2023-04-21

## 2023-04-21 NOTE — TELEPHONE ENCOUNTER
Medication: BACLOFEN 10 MG Oral Tab    Date of last refill: 3/27/23    Last office visit: 2/20/23  Due back to clinic per last office note:  6 months  Date next office visit scheduled:  none        Last OV note recommendation:   Plan:   >baclofen 10mg bid prn for muscle spasms  >continue diclofenac 75mg bid : monitor for bleeding: call for refill/does not need today  >return to clinic every 6 months for med review/otherwise sooner if back pain flares to consider Lumbar MBB and repeat RFA. No orders of the defined types were placed in this encounter.

## 2023-05-11 DIAGNOSIS — M47.816 LUMBAR FACET ARTHROPATHY: Primary | ICD-10-CM

## 2023-05-11 RX ORDER — BACLOFEN 10 MG/1
10 TABLET ORAL 2 TIMES DAILY
Qty: 180 TABLET | Refills: 0 | Status: SHIPPED | OUTPATIENT
Start: 2023-05-21

## 2023-05-16 DIAGNOSIS — E78.00 HIGH CHOLESTEROL: ICD-10-CM

## 2023-05-16 RX ORDER — SIMVASTATIN 20 MG
TABLET ORAL
Qty: 90 TABLET | Refills: 1 | Status: SHIPPED | OUTPATIENT
Start: 2023-05-16

## 2023-05-16 NOTE — TELEPHONE ENCOUNTER
Cholesterol Medication Protocol Passed 05/16/2023 12:44 AM   Protocol Details  ALT < 80    ALT resulted within past year    Lipid panel within past 12 months    Appointment within past 12 or next 3 months   2. High cholesterol  Continue statin. Future Appointments   Date Time Provider Karoline Jorgensen   9/20/2023 12:20 PM Maggie Ga MD [facility] [place]   Refilled per protocol.

## 2023-05-30 ENCOUNTER — TELEPHONE (OUTPATIENT)
Dept: INTERNAL MEDICINE CLINIC | Facility: CLINIC | Age: 58
End: 2023-05-30

## 2023-05-30 NOTE — TELEPHONE ENCOUNTER
Care Everywhere Records Received    Updated Care Everywhere: yes    Date of Service: 05-27-28    From What Facility: [facility]     Speciality: wic    Type of Record: wic/immediate care record     Document Placed:Dr. ARLETTE fong

## 2023-06-14 DIAGNOSIS — M46.1 SACROILIAC INFLAMMATION (HCC): ICD-10-CM

## 2023-06-14 RX ORDER — DICLOFENAC SODIUM 75 MG/1
TABLET, DELAYED RELEASE ORAL
Qty: 60 TABLET | Refills: 2 | Status: SHIPPED | OUTPATIENT
Start: 2023-06-14

## 2023-06-14 NOTE — TELEPHONE ENCOUNTER
Medication: DICLOFENAC 75 MG     Date of last refill: 03/13/23      Last office visit: 02/20/23  Due back to clinic per last office note:  6 months  Date next office visit scheduled:  none        Last OV note recommendation: Plan:   >baclofen 10mg bid prn for muscle spasms  >continue diclofenac 75mg bid : monitor for bleeding: call for refill/does not need today  >return to clinic every 6 months for med review/otherwise sooner if back pain flares to consider Lumbar MBB and repeat RFA. No orders of the defined types were placed in this encounter.

## 2023-06-24 DIAGNOSIS — I10 ESSENTIAL HYPERTENSION: ICD-10-CM

## 2023-06-26 RX ORDER — LOSARTAN POTASSIUM 25 MG/1
TABLET ORAL
Qty: 90 TABLET | Refills: 0 | Status: SHIPPED | OUTPATIENT
Start: 2023-06-26

## 2023-06-26 NOTE — TELEPHONE ENCOUNTER
Hypertension Medications Protocol [id number]/24/2023 05:51 AM   Protocol Details CMP or BMP in past 12 months    Last serum creatinine< 2.0    Appointment in past 6 or next 3 months   1. Essential hypertension  Continue meds.    Future Appointments   Date Time Provider Karoline Jorgensen   9/20/2023 12:20 PM Abner Salvador MD [facility] [place]

## 2023-07-28 ENCOUNTER — TELEPHONE (OUTPATIENT)
Dept: PAIN CLINIC | Facility: CLINIC | Age: 58
End: 2023-07-28

## 2023-07-28 RX ORDER — METHYLPREDNISOLONE 4 MG/1
TABLET ORAL
Qty: 1 EACH | Refills: 0 | Status: SHIPPED | OUTPATIENT
Start: 2023-07-28

## 2023-07-28 NOTE — TELEPHONE ENCOUNTER
Pt calling in stating he would like a medication to be prescribed for his back pain. Pt states he does not remember the name of the medication but it is a steroid that helps with his flare up, and he believes he has had it about three times in the past. I was unable to find the medication the pt was referring to. Please advise.

## 2023-07-31 NOTE — TELEPHONE ENCOUNTER
methylPREDNISolone (MEDROL) 4 MG Oral Tablet Therapy Pack1 each07/28/2023Sig: Take as directedSent to pharmacy as: methylPREDNISolone 4 MG Oral Tablet Therapy Pack (Medrol)E-Prescribing Status: Receipt confirmed by pharmacy (7/28/2023  3:53 PM CDT)

## 2023-09-03 DIAGNOSIS — M47.816 LUMBAR FACET ARTHROPATHY: ICD-10-CM

## 2023-09-05 NOTE — TELEPHONE ENCOUNTER
Medication: baclofen 10 MG Oral Tab     Date of last refill: 5/21/23    Last office visit: 2/20/23  Due back to clinic per last office note:  6 months  Date next office visit scheduled:  called pt who stated he will schedule appt as soon as he can coordinate with his wife's schedule      Last OV note recommendation: Plan:   >baclofen 10mg bid prn for muscle spasms  >continue diclofenac 75mg bid : monitor for bleeding: call for refill/does not need today  >return to clinic every 6 months for med review/otherwise sooner if back pain flares to consider Lumbar MBB and repeat RFA.

## 2023-09-05 NOTE — TELEPHONE ENCOUNTER
Didier Quesada, just let me know when he schedules an appointment, and when he does, I will refill this.

## 2023-09-07 RX ORDER — BACLOFEN 10 MG/1
10 TABLET ORAL 2 TIMES DAILY
Qty: 180 TABLET | Refills: 0 | OUTPATIENT
Start: 2023-09-07

## 2023-09-15 DIAGNOSIS — M46.1 SACROILIAC INFLAMMATION (HCC): ICD-10-CM

## 2023-09-15 RX ORDER — DICLOFENAC SODIUM 75 MG/1
TABLET, DELAYED RELEASE ORAL
Qty: 60 TABLET | Refills: 2 | Status: SHIPPED | OUTPATIENT
Start: 2023-09-15

## 2023-09-19 ENCOUNTER — OFFICE VISIT (OUTPATIENT)
Dept: PAIN CLINIC | Facility: CLINIC | Age: 58
End: 2023-09-19
Payer: COMMERCIAL

## 2023-09-19 ENCOUNTER — LAB ENCOUNTER (OUTPATIENT)
Dept: LAB | Age: 58
End: 2023-09-19
Attending: INTERNAL MEDICINE
Payer: COMMERCIAL

## 2023-09-19 VITALS — OXYGEN SATURATION: 94 % | SYSTOLIC BLOOD PRESSURE: 126 MMHG | HEART RATE: 51 BPM | DIASTOLIC BLOOD PRESSURE: 78 MMHG

## 2023-09-19 DIAGNOSIS — M47.816 FACET ARTHROPATHY, LUMBAR: ICD-10-CM

## 2023-09-19 DIAGNOSIS — M46.1 SACROILIAC INFLAMMATION (HCC): Primary | ICD-10-CM

## 2023-09-19 DIAGNOSIS — Z00.00 PHYSICAL EXAM, ANNUAL: ICD-10-CM

## 2023-09-19 DIAGNOSIS — R73.01 IMPAIRED FASTING GLUCOSE: ICD-10-CM

## 2023-09-19 DIAGNOSIS — M47.816 LUMBAR FACET ARTHROPATHY: ICD-10-CM

## 2023-09-19 DIAGNOSIS — M47.817 LUMBOSACRAL SPONDYLOSIS WITHOUT MYELOPATHY: ICD-10-CM

## 2023-09-19 DIAGNOSIS — E78.00 HIGH CHOLESTEROL: ICD-10-CM

## 2023-09-19 LAB
ALBUMIN SERPL-MCNC: 3.9 G/DL (ref 3.4–5)
ALBUMIN/GLOB SERPL: 1 {RATIO} (ref 1–2)
ALP LIVER SERPL-CCNC: 89 U/L
ALT SERPL-CCNC: 39 U/L
ANION GAP SERPL CALC-SCNC: 3 MMOL/L (ref 0–18)
AST SERPL-CCNC: 26 U/L (ref 15–37)
BASOPHILS # BLD AUTO: 0.07 X10(3) UL (ref 0–0.2)
BASOPHILS NFR BLD AUTO: 1 %
BILIRUB SERPL-MCNC: 0.6 MG/DL (ref 0.1–2)
BUN BLD-MCNC: 17 MG/DL (ref 7–18)
CALCIUM BLD-MCNC: 9.3 MG/DL (ref 8.5–10.1)
CHLORIDE SERPL-SCNC: 106 MMOL/L (ref 98–112)
CHOLEST SERPL-MCNC: 161 MG/DL (ref ?–200)
CO2 SERPL-SCNC: 30 MMOL/L (ref 21–32)
CREAT BLD-MCNC: 1.11 MG/DL
EGFRCR SERPLBLD CKD-EPI 2021: 78 ML/MIN/1.73M2 (ref 60–?)
EOSINOPHIL # BLD AUTO: 0.19 X10(3) UL (ref 0–0.7)
EOSINOPHIL NFR BLD AUTO: 2.7 %
ERYTHROCYTE [DISTWIDTH] IN BLOOD BY AUTOMATED COUNT: 12.2 %
EST. AVERAGE GLUCOSE BLD GHB EST-MCNC: 117 MG/DL (ref 68–126)
FASTING PATIENT LIPID ANSWER: YES
FASTING STATUS PATIENT QL REPORTED: YES
GLOBULIN PLAS-MCNC: 3.8 G/DL (ref 2.8–4.4)
GLUCOSE BLD-MCNC: 102 MG/DL (ref 70–99)
HBA1C MFR BLD: 5.7 % (ref ?–5.7)
HCT VFR BLD AUTO: 46.1 %
HDLC SERPL-MCNC: 32 MG/DL (ref 40–59)
HGB BLD-MCNC: 15.2 G/DL
IMM GRANULOCYTES # BLD AUTO: 0.01 X10(3) UL (ref 0–1)
IMM GRANULOCYTES NFR BLD: 0.1 %
LDLC SERPL CALC-MCNC: 99 MG/DL (ref ?–100)
LYMPHOCYTES # BLD AUTO: 1.63 X10(3) UL (ref 1–4)
LYMPHOCYTES NFR BLD AUTO: 23.2 %
MCH RBC QN AUTO: 31.4 PG (ref 26–34)
MCHC RBC AUTO-ENTMCNC: 33 G/DL (ref 31–37)
MCV RBC AUTO: 95.2 FL
MONOCYTES # BLD AUTO: 0.52 X10(3) UL (ref 0.1–1)
MONOCYTES NFR BLD AUTO: 7.4 %
NEUTROPHILS # BLD AUTO: 4.62 X10 (3) UL (ref 1.5–7.7)
NEUTROPHILS # BLD AUTO: 4.62 X10(3) UL (ref 1.5–7.7)
NEUTROPHILS NFR BLD AUTO: 65.6 %
NONHDLC SERPL-MCNC: 129 MG/DL (ref ?–130)
OSMOLALITY SERPL CALC.SUM OF ELEC: 290 MOSM/KG (ref 275–295)
PLATELET # BLD AUTO: 199 10(3)UL (ref 150–450)
POTASSIUM SERPL-SCNC: 4.1 MMOL/L (ref 3.5–5.1)
PROT SERPL-MCNC: 7.7 G/DL (ref 6.4–8.2)
RBC # BLD AUTO: 4.84 X10(6)UL
SODIUM SERPL-SCNC: 139 MMOL/L (ref 136–145)
TRIGL SERPL-MCNC: 171 MG/DL (ref 30–149)
TSI SER-ACNC: 1.96 MIU/ML (ref 0.36–3.74)
VLDLC SERPL CALC-MCNC: 28 MG/DL (ref 0–30)
WBC # BLD AUTO: 7 X10(3) UL (ref 4–11)

## 2023-09-19 PROCEDURE — 80053 COMPREHEN METABOLIC PANEL: CPT

## 2023-09-19 PROCEDURE — 83036 HEMOGLOBIN GLYCOSYLATED A1C: CPT

## 2023-09-19 PROCEDURE — 85025 COMPLETE CBC W/AUTO DIFF WBC: CPT

## 2023-09-19 PROCEDURE — 99214 OFFICE O/P EST MOD 30 MIN: CPT | Performed by: NURSE PRACTITIONER

## 2023-09-19 PROCEDURE — 3074F SYST BP LT 130 MM HG: CPT | Performed by: NURSE PRACTITIONER

## 2023-09-19 PROCEDURE — 3078F DIAST BP <80 MM HG: CPT | Performed by: NURSE PRACTITIONER

## 2023-09-19 PROCEDURE — 84443 ASSAY THYROID STIM HORMONE: CPT

## 2023-09-19 PROCEDURE — 80061 LIPID PANEL: CPT

## 2023-09-19 RX ORDER — PREGABALIN 25 MG/1
25 CAPSULE ORAL 2 TIMES DAILY
COMMUNITY
Start: 2023-08-31

## 2023-09-19 NOTE — PROGRESS NOTES
Patient presents in office today with reported pain in low back center    Current pain level reported = 2-3/10    Last reported dose of diclofenac this morning, baclofen 2 weeks ago      Narcotic Contract renewal n/a    Urine Drug screen n/a

## 2023-09-20 ENCOUNTER — OFFICE VISIT (OUTPATIENT)
Dept: INTERNAL MEDICINE CLINIC | Facility: CLINIC | Age: 58
End: 2023-09-20
Payer: COMMERCIAL

## 2023-09-20 VITALS
DIASTOLIC BLOOD PRESSURE: 76 MMHG | SYSTOLIC BLOOD PRESSURE: 118 MMHG | HEIGHT: 71 IN | HEART RATE: 54 BPM | WEIGHT: 292 LBS | BODY MASS INDEX: 40.88 KG/M2 | TEMPERATURE: 97 F | RESPIRATION RATE: 16 BRPM

## 2023-09-20 DIAGNOSIS — Z12.5 SCREENING FOR PROSTATE CANCER: ICD-10-CM

## 2023-09-20 DIAGNOSIS — E78.00 HIGH CHOLESTEROL: ICD-10-CM

## 2023-09-20 DIAGNOSIS — R73.01 IMPAIRED FASTING GLUCOSE: ICD-10-CM

## 2023-09-20 DIAGNOSIS — Z00.00 PHYSICAL EXAM, ANNUAL: ICD-10-CM

## 2023-09-20 DIAGNOSIS — Z12.11 SCREENING FOR COLON CANCER: ICD-10-CM

## 2023-09-20 DIAGNOSIS — Z23 NEED FOR VACCINATION: ICD-10-CM

## 2023-09-20 PROCEDURE — 99396 PREV VISIT EST AGE 40-64: CPT | Performed by: INTERNAL MEDICINE

## 2023-09-20 PROCEDURE — 3074F SYST BP LT 130 MM HG: CPT | Performed by: INTERNAL MEDICINE

## 2023-09-20 PROCEDURE — 90686 IIV4 VACC NO PRSV 0.5 ML IM: CPT | Performed by: INTERNAL MEDICINE

## 2023-09-20 PROCEDURE — 3008F BODY MASS INDEX DOCD: CPT | Performed by: INTERNAL MEDICINE

## 2023-09-20 PROCEDURE — 90471 IMMUNIZATION ADMIN: CPT | Performed by: INTERNAL MEDICINE

## 2023-09-20 PROCEDURE — 3078F DIAST BP <80 MM HG: CPT | Performed by: INTERNAL MEDICINE

## 2023-09-23 DIAGNOSIS — I10 ESSENTIAL HYPERTENSION: ICD-10-CM

## 2023-09-25 RX ORDER — LOSARTAN POTASSIUM 25 MG/1
TABLET ORAL
Qty: 90 TABLET | Refills: 1 | Status: SHIPPED | OUTPATIENT
Start: 2023-09-25

## 2023-09-25 RX ORDER — ATENOLOL 25 MG/1
TABLET ORAL
Qty: 180 TABLET | Refills: 1 | Status: SHIPPED | OUTPATIENT
Start: 2023-09-25

## 2023-09-25 NOTE — TELEPHONE ENCOUNTER
Hypertension Medications Protocol [id number]/23/2023 01:00 AM   Protocol Details CMP or BMP in past 12 months    Last serum creatinine< 2.0    Appointment in past 6 or next 3 months      1. Essential hypertension  Continue meds.    Future Appointments   Date Time Provider Karoline Jorgensen   3/20/2024 12:20 PM Jaylen Gray MD [facility] [place]   4/2/2024  8:30 AM Ariel Robertson APRN [facility] [place]

## 2023-10-15 DIAGNOSIS — E78.00 HIGH CHOLESTEROL: ICD-10-CM

## 2023-10-17 RX ORDER — SIMVASTATIN 20 MG
TABLET ORAL
Qty: 90 TABLET | Refills: 0 | Status: SHIPPED | OUTPATIENT
Start: 2023-10-17

## 2023-10-17 NOTE — TELEPHONE ENCOUNTER
Cholesterol Medication Protocol [id number]/15/2023 11:19 AM   Protocol Details ALT < 80    ALT resulted within past year    Lipid panel within past 12 months    Appointment within past 12 or next 3 months   5. High cholesterol  Continue statin. Do labs in 3 months.    Future Appointments   Date Time Provider Karoline Jorgensen   3/20/2024 12:20 PM Belinda Swenson MD [facility] [place]   4/2/2024  8:30 AM Rustam Robertson, RAMON [facility] [place]

## 2023-10-27 ENCOUNTER — TELEPHONE (OUTPATIENT)
Dept: INTERNAL MEDICINE CLINIC | Facility: CLINIC | Age: 58
End: 2023-10-27

## 2023-10-27 NOTE — TELEPHONE ENCOUNTER
Pt called and stated that he saw that ophathmologist [facility])today for his eye sight issues. At that appt the suggested that he get a sleep study done and also have his lipid panel checked. Please place those ordered and call Pt when completed. If unable to write these ordered please call Pts wife sidra [phone]  to schedule appt.

## 2023-10-27 NOTE — TELEPHONE ENCOUNTER
Lipid panel was already checked in 9/2023. See labs. Will need an appt to discuss sleep study. Please obtain ophthalmology notes.

## 2023-11-22 ENCOUNTER — HOSPITAL ENCOUNTER (EMERGENCY)
Facility: HOSPITAL | Age: 58
Discharge: LEFT BEFORE DISCHARGE | End: 2023-11-22
Payer: COMMERCIAL

## 2023-11-28 ENCOUNTER — OFFICE VISIT (OUTPATIENT)
Dept: FAMILY MEDICINE CLINIC | Facility: CLINIC | Age: 58
End: 2023-11-28
Payer: COMMERCIAL

## 2023-11-28 VITALS
BODY MASS INDEX: 41.58 KG/M2 | DIASTOLIC BLOOD PRESSURE: 94 MMHG | HEART RATE: 68 BPM | SYSTOLIC BLOOD PRESSURE: 138 MMHG | HEIGHT: 71 IN | RESPIRATION RATE: 18 BRPM | TEMPERATURE: 98 F | OXYGEN SATURATION: 96 % | WEIGHT: 297 LBS

## 2023-11-28 DIAGNOSIS — R03.0 ELEVATED BLOOD PRESSURE READING: ICD-10-CM

## 2023-11-28 DIAGNOSIS — J02.9 SORE THROAT: Primary | ICD-10-CM

## 2023-11-28 DIAGNOSIS — J06.9 VIRAL UPPER RESPIRATORY TRACT INFECTION: ICD-10-CM

## 2023-11-28 LAB — CONTROL LINE PRESENT WITH A CLEAR BACKGROUND (YES/NO): YES YES/NO

## 2023-11-28 PROCEDURE — 87880 STREP A ASSAY W/OPTIC: CPT | Performed by: NURSE PRACTITIONER

## 2023-11-28 PROCEDURE — 3008F BODY MASS INDEX DOCD: CPT | Performed by: NURSE PRACTITIONER

## 2023-11-28 PROCEDURE — 3080F DIAST BP >= 90 MM HG: CPT | Performed by: NURSE PRACTITIONER

## 2023-11-28 PROCEDURE — 3075F SYST BP GE 130 - 139MM HG: CPT | Performed by: NURSE PRACTITIONER

## 2023-11-28 PROCEDURE — 99213 OFFICE O/P EST LOW 20 MIN: CPT | Performed by: NURSE PRACTITIONER

## 2024-01-04 DIAGNOSIS — M46.1 SACROILIAC INFLAMMATION (HCC): ICD-10-CM

## 2024-01-04 RX ORDER — DICLOFENAC SODIUM 75 MG/1
75 TABLET, DELAYED RELEASE ORAL EVERY 12 HOURS PRN
Qty: 60 TABLET | Refills: 2 | Status: SHIPPED | OUTPATIENT
Start: 2024-01-04

## 2024-01-04 NOTE — TELEPHONE ENCOUNTER
Medication: DICLOFENAC 75 MG     Date of last refill: 09/15/23      Last office visit: 09/19/23  Due back to clinic per last office note:  6 months  Date next office visit scheduled:  04/02/24        Last OV note recommendation:   Plan:   > DC baclofen 10 mg twice daily as needed for muscle spasms: Patient has stopped using this  >continue diclofenac 75mg bid : monitor for bleeding: call for refill/does not need today  >return to clinic every 6 months for med review/otherwise sooner if back pain flares to consider Lumbar MBB and repeat RFA.   No orders of the defined types were placed in this encounter.

## 2024-01-30 DIAGNOSIS — E78.00 HIGH CHOLESTEROL: ICD-10-CM

## 2024-01-31 RX ORDER — SIMVASTATIN 20 MG
TABLET ORAL
Qty: 90 TABLET | Refills: 0 | Status: SHIPPED | OUTPATIENT
Start: 2024-01-31

## 2024-01-31 NOTE — TELEPHONE ENCOUNTER
Cholesterol Medication Protocol [id number]/30/2024 06:24 AM   Protocol Details ALT < 80    ALT resulted within past year    Lipid panel within past 12 months    Appointment within past 12 or next 3 months      5. High cholesterol  Continue statin. Do labs in 3 months.  Future Appointments   Date Time Provider Department Center   3/20/2024 12:20 PM Karen Doss MD [facility]   4/2/2024  8:30 AM Laura Robertson APRN [facility] [place]

## 2024-03-22 DIAGNOSIS — I10 ESSENTIAL HYPERTENSION: ICD-10-CM

## 2024-03-22 RX ORDER — ATENOLOL 25 MG/1
25 TABLET ORAL 2 TIMES DAILY
Qty: 180 TABLET | Refills: 0 | Status: SHIPPED | OUTPATIENT
Start: 2024-03-22

## 2024-03-22 RX ORDER — LOSARTAN POTASSIUM 25 MG/1
25 TABLET ORAL DAILY
Qty: 90 TABLET | Refills: 0 | Status: SHIPPED | OUTPATIENT
Start: 2024-03-22

## 2024-03-22 NOTE — TELEPHONE ENCOUNTER
Last OV relevant to medication: 9/20/23  Last refill date: 9/25/23 #90/refills: 1  When pt was asked to return for OV: 3/20/24  Upcoming appt/reason:   Future Appointments   Date Time Provider Department Center   4/2/2024  8:30 AM Laura Robertson APRN [facility] [place]   Was pt informed of any over due labs: message sent  Lab Results   Component Value Date     (H) 09/19/2023    BUN 17 09/19/2023    BUNCREA 20.0 06/23/2021    CREATSERUM 1.11 09/19/2023    ANIONGAP 3 09/19/2023    GFR 97 11/18/2016    GFRNAA 89 03/29/2022    GFRAA 103 03/29/2022    CA 9.3 09/19/2023    OSMOCALC 290 09/19/2023    ALKPHO 89 09/19/2023    AST 26 09/19/2023    ALT 39 09/19/2023    BILT 0.6 09/19/2023    TP 7.7 09/19/2023    ALB 3.9 09/19/2023    GLOBULIN 3.8 09/19/2023     09/19/2023    K 4.1 09/19/2023     09/19/2023    CO2 30.0 09/19/2023     Front office please call to schedule med check, thanks!

## 2024-04-02 ENCOUNTER — OFFICE VISIT (OUTPATIENT)
Dept: PAIN CLINIC | Facility: CLINIC | Age: 59
End: 2024-04-02
Payer: COMMERCIAL

## 2024-04-02 VITALS
WEIGHT: 297 LBS | DIASTOLIC BLOOD PRESSURE: 80 MMHG | BODY MASS INDEX: 41 KG/M2 | OXYGEN SATURATION: 99 % | HEART RATE: 54 BPM | SYSTOLIC BLOOD PRESSURE: 138 MMHG

## 2024-04-02 DIAGNOSIS — M46.1 SACROILIAC INFLAMMATION (HCC): Primary | ICD-10-CM

## 2024-04-02 DIAGNOSIS — M47.816 LUMBAR FACET ARTHROPATHY: ICD-10-CM

## 2024-04-02 DIAGNOSIS — M47.817 LUMBOSACRAL SPONDYLOSIS WITHOUT MYELOPATHY: ICD-10-CM

## 2024-04-02 PROCEDURE — 3075F SYST BP GE 130 - 139MM HG: CPT | Performed by: NURSE PRACTITIONER

## 2024-04-02 PROCEDURE — 99214 OFFICE O/P EST MOD 30 MIN: CPT | Performed by: NURSE PRACTITIONER

## 2024-04-02 PROCEDURE — 3079F DIAST BP 80-89 MM HG: CPT | Performed by: NURSE PRACTITIONER

## 2024-04-02 NOTE — PATIENT INSTRUCTIONS
Refill policies:    Allow 2-3 business days for refills; controlled substances may take longer.  Contact your pharmacy at least 5 days prior to running out of medication and have them send an electronic request or submit request through the “request refill” option in your [employer] account.  Refills are not addressed on weekends; covering physicians do not authorize routine medications on weekends.  No narcotics or controlled substances are refilled after noon on Fridays or by on call physicians.  By law, narcotics must be electronically prescribed.  A 30 day supply with no refills is the maximum allowed.  If your prescription is due for a refill, you may be due for a follow up appointment.  To best provide you care, patients receiving routine medications need to be seen at least once a year.  Patients receiving narcotic/controlled substance medications need to be seen at least once every 3 months.  In the event that your preferred pharmacy does not have the requested medication in stock (e.g. Backordered), it is your responsibility to find another pharmacy that has the requested medication available.  We will gladly send a new prescription to that pharmacy at your request.    Scheduling Tests:    If your physician has ordered radiology tests such as MRI or CT scans, please contact Central Scheduling at [phone] right away to schedule the test.  Once scheduled, the [facility] Centralized Referral Team will work with your insurance carrier to obtain pre-certification or prior authorization.  Depending on your insurance carrier, approval may take 3-10 days.  It is highly recommended patients assure they have received an authorization before having a test performed.  If test is done without insurance authorization, patient may be responsible for the entire amount billed.      Precertification and Prior Authorizations:  If your physician has recommended that you have a procedure or additional testing performed the [facility]  Centralized Referral Team will contact your insurance carrier to obtain pre-certification or prior authorization.    You are strongly encouraged to contact your insurance carrier to verify that your procedure/test has been approved and is a COVERED benefit.  Although the [facility] Centralized Referral Team does its due diligence, the insurance carrier gives the disclaimer that \"Although the procedure is authorized, this does not guarantee payment.\"    Ultimately the patient is responsible for payment.   Thank you for your understanding in this matter.  Paperwork Completion:  If you require FMLA or disability paperwork for your recovery, please make sure to either drop it off or have it faxed to our office at [phone]. Be sure the form has your name and date of birth on it.  The form will be faxed to our Forms Department and they will complete it for you.  There is a 25$ fee for all forms that need to be filled out.  Please be aware there is a 10-14 day turnaround time.  You will need to sign a release of information (BEATRIZ) form if your paperwork does not come with one.  You may call the Forms Department with any questions at [phone].  Their fax number is [phone].
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
## 2024-04-02 NOTE — PROGRESS NOTES
Patient presents in office today with reported pain in low back    Current pain level reported = 0-1/10    Last reported dose of diclofenac this morning      Narcotic Contract renewal none    Urine Drug screen none

## 2024-04-04 DIAGNOSIS — M46.1 SACROILIAC INFLAMMATION (HCC): ICD-10-CM

## 2024-04-04 RX ORDER — DICLOFENAC SODIUM 75 MG/1
75 TABLET, DELAYED RELEASE ORAL EVERY 12 HOURS PRN
Qty: 60 TABLET | Refills: 2 | Status: SHIPPED | OUTPATIENT
Start: 2024-04-04

## 2024-04-04 NOTE — TELEPHONE ENCOUNTER
Medication: diclofenac 75 MG     Date of last refill: 01/04/24      Last office visit: 04/02/24  Due back to clinic per last office note:  6 months  Date next office visit scheduled:  10/01/24        Last OV note recommendation:   Plan:   > ok to use baclofen prn  >continue diclofenac 75mg bid : monitor for bleeding: call for refill/does not need today  >return to clinic every 6 months for med review/otherwise sooner if back pain flares to consider Lumbar MBB and repeat RFA vs. Right SIJ   No orders of the defined types were placed in this encounter.

## 2024-04-12 ENCOUNTER — OFFICE VISIT (OUTPATIENT)
Dept: INTERNAL MEDICINE CLINIC | Facility: CLINIC | Age: 59
End: 2024-04-12
Payer: COMMERCIAL

## 2024-04-12 ENCOUNTER — LAB ENCOUNTER (OUTPATIENT)
Dept: LAB | Age: 59
End: 2024-04-12
Attending: INTERNAL MEDICINE
Payer: COMMERCIAL

## 2024-04-12 VITALS
TEMPERATURE: 98 F | WEIGHT: 288.88 LBS | DIASTOLIC BLOOD PRESSURE: 90 MMHG | SYSTOLIC BLOOD PRESSURE: 158 MMHG | HEIGHT: 69.5 IN | HEART RATE: 60 BPM | BODY MASS INDEX: 41.82 KG/M2 | RESPIRATION RATE: 12 BRPM

## 2024-04-12 DIAGNOSIS — E78.00 HIGH CHOLESTEROL: ICD-10-CM

## 2024-04-12 DIAGNOSIS — R73.01 IMPAIRED FASTING GLUCOSE: ICD-10-CM

## 2024-04-12 DIAGNOSIS — F32.A DEPRESSION, UNSPECIFIED DEPRESSION TYPE: ICD-10-CM

## 2024-04-12 DIAGNOSIS — I10 ESSENTIAL HYPERTENSION: ICD-10-CM

## 2024-04-12 DIAGNOSIS — Z12.5 SCREENING FOR PROSTATE CANCER: ICD-10-CM

## 2024-04-12 DIAGNOSIS — G43.709 CHRONIC MIGRAINE WITHOUT AURA WITHOUT STATUS MIGRAINOSUS, NOT INTRACTABLE: ICD-10-CM

## 2024-04-12 DIAGNOSIS — R73.03 PREDIABETES: ICD-10-CM

## 2024-04-12 LAB
ALBUMIN SERPL-MCNC: 4 G/DL (ref 3.4–5)
ALBUMIN/GLOB SERPL: 1 {RATIO} (ref 1–2)
ALP LIVER SERPL-CCNC: 107 U/L
ALT SERPL-CCNC: 35 U/L
ANION GAP SERPL CALC-SCNC: 8 MMOL/L (ref 0–18)
AST SERPL-CCNC: 21 U/L (ref 15–37)
BILIRUB SERPL-MCNC: 0.4 MG/DL (ref 0.1–2)
BUN BLD-MCNC: 18 MG/DL (ref 9–23)
CALCIUM BLD-MCNC: 9.3 MG/DL (ref 8.5–10.1)
CHLORIDE SERPL-SCNC: 108 MMOL/L (ref 98–112)
CHOLEST SERPL-MCNC: 153 MG/DL (ref ?–200)
CO2 SERPL-SCNC: 24 MMOL/L (ref 21–32)
COMPLEXED PSA SERPL-MCNC: 1.55 NG/ML (ref ?–4)
CREAT BLD-MCNC: 1.03 MG/DL
EGFRCR SERPLBLD CKD-EPI 2021: 85 ML/MIN/1.73M2 (ref 60–?)
EST. AVERAGE GLUCOSE BLD GHB EST-MCNC: 117 MG/DL (ref 68–126)
FASTING PATIENT LIPID ANSWER: YES
FASTING STATUS PATIENT QL REPORTED: YES
GLOBULIN PLAS-MCNC: 3.9 G/DL (ref 2.8–4.4)
GLUCOSE BLD-MCNC: 102 MG/DL (ref 70–99)
HBA1C MFR BLD: 5.7 % (ref ?–5.7)
HDLC SERPL-MCNC: 34 MG/DL (ref 40–59)
LDLC SERPL CALC-MCNC: 98 MG/DL (ref ?–100)
NONHDLC SERPL-MCNC: 119 MG/DL (ref ?–130)
OSMOLALITY SERPL CALC.SUM OF ELEC: 292 MOSM/KG (ref 275–295)
POTASSIUM SERPL-SCNC: 4.2 MMOL/L (ref 3.5–5.1)
PROT SERPL-MCNC: 7.9 G/DL (ref 6.4–8.2)
SODIUM SERPL-SCNC: 140 MMOL/L (ref 136–145)
TRIGL SERPL-MCNC: 114 MG/DL (ref 30–149)
VLDLC SERPL CALC-MCNC: 19 MG/DL (ref 0–30)

## 2024-04-12 PROCEDURE — 3077F SYST BP >= 140 MM HG: CPT | Performed by: INTERNAL MEDICINE

## 2024-04-12 PROCEDURE — 99214 OFFICE O/P EST MOD 30 MIN: CPT | Performed by: INTERNAL MEDICINE

## 2024-04-12 PROCEDURE — 83036 HEMOGLOBIN GLYCOSYLATED A1C: CPT

## 2024-04-12 PROCEDURE — 80053 COMPREHEN METABOLIC PANEL: CPT

## 2024-04-12 PROCEDURE — 80061 LIPID PANEL: CPT

## 2024-04-12 PROCEDURE — 3008F BODY MASS INDEX DOCD: CPT | Performed by: INTERNAL MEDICINE

## 2024-04-12 PROCEDURE — 3080F DIAST BP >= 90 MM HG: CPT | Performed by: INTERNAL MEDICINE

## 2024-04-12 RX ORDER — LOSARTAN POTASSIUM 50 MG/1
50 TABLET ORAL DAILY
Qty: 90 TABLET | Refills: 0 | Status: SHIPPED | OUTPATIENT
Start: 2024-04-12
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
## 2024-05-06 DIAGNOSIS — E78.00 HIGH CHOLESTEROL: ICD-10-CM

## 2024-05-06 RX ORDER — SIMVASTATIN 20 MG
TABLET ORAL
Qty: 90 TABLET | Refills: 0 | Status: SHIPPED | OUTPATIENT
Start: 2024-05-06

## 2024-05-06 NOTE — TELEPHONE ENCOUNTER
Cholesterol Medication Protocol [id number]/06/2024 12:32 AM   Protocol Details ALT < 80    ALT resulted within past year    Lipid panel within past 12 months    In person appointment or virtual visit in the past 12 mos or appointment in next 3 mos      2. High cholesterol  Continue meds.

## 2024-05-13 NOTE — TELEPHONE ENCOUNTER
Discharge Facility: [facility]  Discharge Diagnosis: Diarrhea   Admission Date: 5-5-2024  Discharge Date: 5-    PCP Appointment Date: 5-    Specialist Appointment Date:   -HOLTER OR CARDIAC EVENT MONITOR  -Follow up with Mickey Romero  (Follow up monitor results with any available provider in the office 6 weeks after monitor is placed)  -FOLLOW UP WITH CHARLIE WALKER  -  Hospital Encounter and Summary: Linked   2 DAY HOSP FU  - NO OUTREACH REQUIRED   Medical clearance needed- no  Pt seen in OV today by Dr. Diallo Lion and recommended for TLESI (X 1). Please begin PA process for procedure(s).      Laterality: RIGHT  Level(s): L2-3, L4-5    Pt informed callback will be given when PA feedback received and proced

## 2024-06-06 ENCOUNTER — TELEPHONE (OUTPATIENT)
Dept: PAIN CLINIC | Facility: CLINIC | Age: 59
End: 2024-06-06

## 2024-06-06 RX ORDER — METHYLPREDNISOLONE 4 MG/1
TABLET ORAL
Qty: 1 EACH | Refills: 0 | Status: SHIPPED | OUTPATIENT
Start: 2024-06-06

## 2024-06-06 NOTE — TELEPHONE ENCOUNTER
Patient states his lower back is flaring up and is asking if   methylPREDNISolone (MEDROL) 4 MG Oral Tablet Therapy Pack can be prescribed.

## 2024-06-22 DIAGNOSIS — I10 ESSENTIAL HYPERTENSION: ICD-10-CM

## 2024-06-24 RX ORDER — LOSARTAN POTASSIUM 25 MG/1
25 TABLET ORAL DAILY
Qty: 90 TABLET | Refills: 1 | Status: SHIPPED | OUTPATIENT
Start: 2024-06-24

## 2024-06-24 RX ORDER — ATENOLOL 25 MG/1
25 TABLET ORAL 2 TIMES DAILY
Qty: 180 TABLET | Refills: 1 | Status: SHIPPED | OUTPATIENT
Start: 2024-06-24

## 2024-06-24 NOTE — TELEPHONE ENCOUNTER
Last OV relevant to medication: 4/12/24  Last refill date: 4/12/24 and 3/22/24 #90/refills: 0  When pt was asked to return for OV: 7/12/24  Upcoming appt/reason:   Future Appointments   Date Time Provider Department Center   7/12/2024 11:40 AM Karen Doss MD [facility] [address] [place]   10/1/2024  8:30 AM Laura Robertson APRN [facility] [place]   Was pt informed of any over due labs: utd  Lab Results   Component Value Date     (H) 04/12/2024    BUN 18 04/12/2024    BUNCREA 20.0 06/23/2021    CREATSERUM 1.03 04/12/2024    ANIONGAP 8 04/12/2024    GFR 97 11/18/2016    GFRNAA 89 03/29/2022    GFRAA 103 03/29/2022    CA 9.3 04/12/2024    OSMOCALC 292 04/12/2024    ALKPHO 107 04/12/2024    AST 21 04/12/2024    ALT 35 04/12/2024    BILT 0.4 04/12/2024    TP 7.9 04/12/2024    ALB 4.0 04/12/2024    GLOBULIN 3.9 04/12/2024     04/12/2024    K 4.2 04/12/2024     04/12/2024    CO2 24.0 04/12/2024

## 2024-07-03 ENCOUNTER — TELEPHONE (OUTPATIENT)
Dept: INTERNAL MEDICINE CLINIC | Facility: CLINIC | Age: 59
End: 2024-07-03

## 2024-07-09 ENCOUNTER — APPOINTMENT (OUTPATIENT)
Dept: GENERAL RADIOLOGY | Age: 59
End: 2024-07-09
Attending: NURSE PRACTITIONER
Payer: COMMERCIAL

## 2024-07-09 ENCOUNTER — HOSPITAL ENCOUNTER (OUTPATIENT)
Age: 59
Discharge: HOME | End: 2024-07-09
Payer: COMMERCIAL

## 2024-07-09 VITALS
HEIGHT: 70.5 IN | SYSTOLIC BLOOD PRESSURE: 168 MMHG | BODY MASS INDEX: 41.48 KG/M2 | HEART RATE: 61 BPM | OXYGEN SATURATION: 95 % | DIASTOLIC BLOOD PRESSURE: 86 MMHG | RESPIRATION RATE: 16 BRPM | WEIGHT: 293 LBS | TEMPERATURE: 99 F

## 2024-07-09 DIAGNOSIS — M25.461 EFFUSION OF RIGHT KNEE: ICD-10-CM

## 2024-07-09 DIAGNOSIS — I10 PRIMARY HYPERTENSION: ICD-10-CM

## 2024-07-09 DIAGNOSIS — M25.561 ARTHRALGIA OF KNEE, RIGHT: Primary | ICD-10-CM

## 2024-07-09 PROCEDURE — 99214 OFFICE O/P EST MOD 30 MIN: CPT

## 2024-07-09 PROCEDURE — 73560 X-RAY EXAM OF KNEE 1 OR 2: CPT | Performed by: NURSE PRACTITIONER

## 2024-07-09 PROCEDURE — 99213 OFFICE O/P EST LOW 20 MIN: CPT
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
## 2024-07-09 NOTE — ED INITIAL ASSESSMENT (HPI)
Patient here for evalution of right knee pain that started on 7/4 when it was warm to the touch and felt like he sprained the knee. States he felt better when he was moving around but today it is bothering him again.  Denies any injury or trauma or any history of gout. States he takes diclofenac for his back pain but no difference with the knee pain when he takes it. States the knee pain is worse when he is lying down and the pain is primarily to the inner knee area.

## 2024-07-09 NOTE — DISCHARGE INSTRUCTIONS
Supportive Care Measures:   - Naproxen (440mg - 500mg) or Ibuprofen (600mg) as needed for pain (do not take with Diclofenac, but in lieu of)   - Apply ace wrap (or brace) throughout the day   - Elevate leg whenever possible   - Ice as tolerated for pain / swelling   - You may benefit from over-the-counter topical pain medication such as: Voltaren (Diclofenac), Icy/Hot, Biofreeze, Bengay, etc.   - Avoid excessive standing / walking until symptoms improve   - You may benefit from Epsom salt soaks in warm water throughout the day - 20min at a time   - Follow up with your doctor (or orthopedic specialist) as needed

## 2024-07-12 ENCOUNTER — OFFICE VISIT (OUTPATIENT)
Dept: INTERNAL MEDICINE CLINIC | Facility: CLINIC | Age: 59
End: 2024-07-12
Payer: COMMERCIAL

## 2024-07-12 VITALS
SYSTOLIC BLOOD PRESSURE: 180 MMHG | RESPIRATION RATE: 16 BRPM | BODY MASS INDEX: 43.03 KG/M2 | TEMPERATURE: 98 F | DIASTOLIC BLOOD PRESSURE: 100 MMHG | HEIGHT: 69.5 IN | HEART RATE: 76 BPM | WEIGHT: 297.19 LBS

## 2024-07-12 DIAGNOSIS — Z12.11 SCREENING FOR COLON CANCER: Primary | ICD-10-CM

## 2024-07-12 DIAGNOSIS — R21 RASH AND NONSPECIFIC SKIN ERUPTION: ICD-10-CM

## 2024-07-12 DIAGNOSIS — I10 ESSENTIAL HYPERTENSION: ICD-10-CM

## 2024-07-12 RX ORDER — METOCLOPRAMIDE 5 MG/1
5 TABLET ORAL AS NEEDED
COMMUNITY

## 2024-07-12 RX ORDER — LOSARTAN POTASSIUM 50 MG/1
50 TABLET ORAL DAILY
Qty: 90 TABLET | Refills: 0 | Status: SHIPPED | OUTPATIENT
Start: 2024-07-12

## 2024-07-12 RX ORDER — NYSTATIN 100000 [USP'U]/G
1 POWDER TOPICAL 3 TIMES DAILY PRN
Qty: 60 G | Refills: 0 | Status: SHIPPED | OUTPATIENT
Start: 2024-07-12

## 2024-07-12 NOTE — PROGRESS NOTES
[facility]    CHIEF COMPLAINT:    Chief Complaint   Patient presents with    Blood Pressure     8/18/22-neg FIT         HISTORY OF PRESENT ILLNESS:  here for bp check.   Bp elevated today.   Losartan was raised to 50mg at the last visit. He is not sure he went up on it. Also recent refill was for 25mg daily.  He has had a lot of stress lately - ptsd has been acting up, he just started driving again, found out sister had endometrial cancer, his close friend has kidney failure maybe from covid.   Readings at home have been in the 130s to 140s systolic.   Also on atenolol.     He wants a refill on the nystatin powder.     REVIEW OF SYSTEMS:  See HPI    Current Medications:    Current Outpatient Medications   Medication Sig Dispense Refill    NON FORMULARY Eyelea injection. Intraocular injection every 6 weeks      metoclopramide 5 MG Oral Tab Take 1 tablet (5 mg total) by mouth as needed.      losartan 50 MG Oral Tab Take 1 tablet (50 mg total) by mouth daily. 90 tablet 0    Nystatin [id number] UNIT/GM External Powder Apply 1 Application  topically 3 (three) times daily as needed. 60 g 0    atenolol 25 MG Oral Tab Take 1 tablet (25 mg total) by mouth 2 (two) times daily. 180 tablet 1    SIMVASTATIN 20 MG Oral Tab TAKE 1 TABLET BY MOUTH EVERY DAY AT NIGHT 90 tablet 0    DICLOFENAC 75 MG Oral Tab EC TAKE 1 TABLET (75 MG TOTAL) BY MOUTH EVERY 12 (TWELVE) HOURS AS NEEDED. 60 tablet 2    Rimegepant Sulfate 75 MG Oral Tablet Dispersible Take 1 tablet by mouth as needed.      pregabalin 25 MG Oral Cap Take 1 capsule (25 mg total) by mouth 2 (two) times daily.      EMGALITY 120 MG/ML Subcutaneous Solution Auto-injector Every 3 weeks      sertraline 100 MG Oral Tab Take 1 tablet (100 mg total) by mouth daily. Taking 1/2 tablet (50mg) BID      Loperamide HCl 2 MG Oral Cap Take 1 capsule (2 mg total) by mouth as needed for Diarrhea.      Ondansetron HCl (ZOFRAN) 4 mg tablet   5    ClonazePAM 0.5 MG Oral Tab TAKE 1 TABLET BY  MOUTH FIVE TIMES DAILY 150 tablet 0    Methylphenidate HCl (RITALIN) 5 MG Oral Tab take 2 by mouth twice daily and 1 by mouth each afternoon. (Patient taking differently: take 2 by mouth in the morning  and 2 at noon, and 1 each afternoon.) 150 tablet 0    diphenhydrAMINE HCl 25 MG Oral Tab Take 1 tablet (25 mg total) by mouth every 6 (six) hours as needed.         PAST MEDICAL, SOCIAL, AND FAMILY HISTORY:  Tobacco:    History   Smoking Status    Never   Smokeless Tobacco    Never       PHYSICAL EXAM:  BP (!) 180/100 (BP Location: Right arm, Patient Position: Sitting, Cuff Size: large)   Pulse 76   Temp 97.8 °F (36.6 °C) (Temporal)   Resp 16   Ht 5' 9.5\" (1.765 m)   Wt 297 lb 3.2 oz (134.8 kg)   BMI 43.26 kg/m²    GENERAL: well developed, well nourished,in no apparent distress  LUNGS: clear to auscultation  CARDIO: RRR without murmur  MUSCULOSKELETAL:  no edema, muscle strength normal.     DATA:  Results for orders placed or performed in visit on 04/12/24   PSA, Total (Screening) [E]   Result Value Ref Range    Prostate Specific Antigen Screen 1.55 <=4.00 ng/mL   CMP in 3 months   Result Value Ref Range    Glucose 102 (H) 70 - 99 mg/dL    Sodium 140 136 - 145 mmol/L    Potassium 4.2 3.5 - 5.1 mmol/L    Chloride 108 98 - 112 mmol/L    CO2 24.0 21.0 - 32.0 mmol/L    Anion Gap 8 0 - 18 mmol/L    BUN 18 9 - 23 mg/dL    Creatinine 1.03 0.70 - 1.30 mg/dL    Calcium, Total 9.3 8.5 - 10.1 mg/dL    Calculated Osmolality 292 275 - 295 mOsm/kg    eGFR-Cr 85 >=60 mL/min/1.73m2    AST 21 15 - 37 U/L    ALT 35 16 - 61 U/L    Alkaline Phosphatase 107 45 - 117 U/L    Bilirubin, Total 0.4 0.1 - 2.0 mg/dL    Total Protein 7.9 6.4 - 8.2 g/dL    Albumin 4.0 3.4 - 5.0 g/dL    Globulin  3.9 2.8 - 4.4 g/dL    A/G Ratio 1.0 1.0 - 2.0    Patient Fasting for CMP? Yes    Lipid in 3 months   Result Value Ref Range    Cholesterol, Total 153 <200 mg/dL    HDL Cholesterol 34 (L) 40 - 59 mg/dL    Triglycerides 114 30 - 149 mg/dL    LDL  Cholesterol 98 <100 mg/dL    VLDL 19 0 - 30 mg/dL    Non HDL Chol 119 <130 mg/dL    Patient Fasting for Lipid? Yes    Hemoglobin A1C in 3 months   Result Value Ref Range    HgbA1C 5.7 (H) <5.7 %    Estimated Average Glucose 117 68 - 126 mg/dL            ASSESSMENT AND PLAN:  1. Essential hypertension  Increase losartan to 50mg daily.   Continue atenolol 25mg daily.   Send me bp readings in 2 weeks. Instructed on how to check bp at home.   - losartan 50 MG Oral Tab; Take 1 tablet (50 mg total) by mouth daily.  Dispense: 90 tablet; Refill: 0    2. Screening for colon cancer  - Occult Blood, Fecal, Immunoassay (Blue cards) [E]; Future    3. Rash and nonspecific skin eruption  - Nystatin [id number] UNIT/GM External Powder; Apply 1 Application  topically 3 (three) times daily as needed.  Dispense: 60 g; Refill: 0        Return in about 6 weeks (around 8/23/2024) for bp check.      Karen Doss MD

## 2024-07-15 DIAGNOSIS — M46.1 SACROILIAC INFLAMMATION (HCC): ICD-10-CM

## 2024-07-15 RX ORDER — DICLOFENAC SODIUM 75 MG/1
75 TABLET, DELAYED RELEASE ORAL EVERY 12 HOURS PRN
Qty: 60 TABLET | Refills: 2 | Status: SHIPPED | OUTPATIENT
Start: 2024-07-15

## 2024-07-15 NOTE — TELEPHONE ENCOUNTER
Medication: DICLOFENAC 75 MG Oral Tab EC     Date of last refill: 4/4/24    Last office visit: 4/2/24  Due back to clinic per last office note:  6 months  Date next office visit scheduled:  10/1/24        Last OV note recommendation: Plan:   > ok to use baclofen prn  >continue diclofenac 75mg bid : monitor for bleeding: call for refill/does not need today  >return to clinic every 6 months for med review/otherwise sooner if back pain flares to consider Lumbar MBB and repeat RFA vs. Right SIJ

## 2024-08-05 DIAGNOSIS — E78.00 HIGH CHOLESTEROL: ICD-10-CM

## 2024-08-06 RX ORDER — SIMVASTATIN 20 MG
TABLET ORAL
Qty: 90 TABLET | Refills: 1 | Status: SHIPPED | OUTPATIENT
Start: 2024-08-06

## 2024-09-06 DIAGNOSIS — M46.1 SACROILIAC INFLAMMATION (HCC): ICD-10-CM

## 2024-09-06 NOTE — TELEPHONE ENCOUNTER
On 7/15/2024 he was given a 3-month supply (1 month, with 2 refills).  As such, he should have plenty remaining at this time.  Can we confirm?

## 2024-09-06 NOTE — TELEPHONE ENCOUNTER
Medication:   DICLOFENAC 75 MG Oral Tab EC     Date of last refill: 7/15/24    Last office visit: 4/2/24  Due back to clinic per last office note:  6 months  Date next office visit scheduled:  10/01/24    Last OV note recommendation:   Medical Decision Making:   Diagnosis:         Encounter Diagnoses   Name Primary?    Sacroiliac inflammation (HCC) Yes    Lumbosacral spondylosis without myelopathy      Lumbar facet arthropathy        Impression:   Patient is doing well with the nsaids for his back pain/he stopped his baclofen use on a regular basis due to sedative qualities.  He does use it on occasion.          He continues with his neuropathic medication per neurology as he has a history of migraines and that has also helped his back pain.  He feels very stable.      His diclofenac is 75 mg twice daily.  This was recently refilled He denies any side effects from the medication and feels it is very helpful improving his activities of daily living and quality of life.  He also finds that using the diclofenac reduces the severity of his migraines.  We will continue to provide him this medication.            He also continues working with Dr. Patten psychiatry  He otherwise feels stable.  After exam and d/w patient we developed the following plan.  He was reminded he has not had lumbar MBB nor RFA to lumbar MB since 2019 therefore if lumbar pain progresses without radicular sx we would consider repeating these blocks and RFA.  Patient also had 100% sustained relief in 2019 from right sacroiliac joint injection and RFA would consider repeat.      he is aware and will contact office prn for injections/but f/u for med eval every 6 months/he prefers to continue receiving his nsaid/muscle relaxants from our office  Plan:   > ok to use baclofen prn  >continue diclofenac 75mg bid : monitor for bleeding: call for refill/does not need today  >return to clinic every 6 months for med review/otherwise sooner if back pain flares to  consider Lumbar MBB and repeat RFA vs. Right SIJ   No orders of the defined types were placed in this encounter.        Meds & Refills for this Visit:  Requested Prescriptions        No prescriptions requested or ordered in this encounter         Imaging & Consults:  None     The patient indicates understanding of these issues and agrees to the plan.        ID#[id number]

## 2024-09-10 RX ORDER — DICLOFENAC SODIUM 75 MG/1
75 TABLET, DELAYED RELEASE ORAL EVERY 12 HOURS PRN
Qty: 60 TABLET | Refills: 2 | OUTPATIENT
Start: 2024-09-10

## 2024-09-10 NOTE — TELEPHONE ENCOUNTER
RN contacted the patient at this time and advised that he should have had enough of his diclofenac from his refill on 7/15. Advised pt that he just needs to call his pharmacy to request it. Pt vu and no further questions at this time.

## 2024-10-02 ENCOUNTER — OFFICE VISIT (OUTPATIENT)
Dept: INTERNAL MEDICINE CLINIC | Facility: CLINIC | Age: 59
End: 2024-10-02
Payer: COMMERCIAL

## 2024-10-02 VITALS
HEART RATE: 63 BPM | SYSTOLIC BLOOD PRESSURE: 160 MMHG | RESPIRATION RATE: 20 BRPM | HEIGHT: 69.5 IN | OXYGEN SATURATION: 97 % | WEIGHT: 296 LBS | DIASTOLIC BLOOD PRESSURE: 90 MMHG | TEMPERATURE: 98 F | BODY MASS INDEX: 42.85 KG/M2

## 2024-10-02 DIAGNOSIS — E78.00 HIGH CHOLESTEROL: ICD-10-CM

## 2024-10-02 DIAGNOSIS — F39 MOOD DISORDER (HCC): ICD-10-CM

## 2024-10-02 DIAGNOSIS — Z00.00 PHYSICAL EXAM, ANNUAL: ICD-10-CM

## 2024-10-02 DIAGNOSIS — I10 ESSENTIAL HYPERTENSION: ICD-10-CM

## 2024-10-02 DIAGNOSIS — R73.03 PREDIABETES: ICD-10-CM

## 2024-10-02 PROCEDURE — G2211 COMPLEX E/M VISIT ADD ON: HCPCS | Performed by: INTERNAL MEDICINE

## 2024-10-02 PROCEDURE — 3077F SYST BP >= 140 MM HG: CPT | Performed by: INTERNAL MEDICINE

## 2024-10-02 PROCEDURE — 3080F DIAST BP >= 90 MM HG: CPT | Performed by: INTERNAL MEDICINE

## 2024-10-02 PROCEDURE — 99214 OFFICE O/P EST MOD 30 MIN: CPT | Performed by: INTERNAL MEDICINE

## 2024-10-02 PROCEDURE — 3008F BODY MASS INDEX DOCD: CPT | Performed by: INTERNAL MEDICINE

## 2024-10-02 RX ORDER — LOSARTAN POTASSIUM 100 MG/1
100 TABLET ORAL DAILY
Qty: 90 TABLET | Refills: 0 | Status: SHIPPED | OUTPATIENT
Start: 2024-10-02

## 2024-10-02 NOTE — PROGRESS NOTES
[facility]    CHIEF COMPLAINT:    Chief Complaint   Patient presents with    Hypertension     6 weeks b/p check 4/`12/24 Psa, 5/27/09 10 yrs repeat . Occult  Fecal/ fit order 7/12/24         HISTORY OF PRESENT ILLNESS:  here for med check.   Htn: Taking meds regularly. No chest pain, no shortness of breath. Elevated today.      Hyperlipidemia: on statin. No muscle aches. Will need labs.       Pre diabetes: needs labs. Working on low carb diet. Has been working on a high fiber diet as well.      Mood is stable. Seeing psych. Has been having panic attacks and that has been very stressful. He is working with psychiatry and a therapist to overcome this.      Migraines: Sees neuro at [facility].        REVIEW OF SYSTEMS:  See HPI    Current Medications:    Current Outpatient Medications   Medication Sig Dispense Refill    losartan 100 MG Oral Tab Take 1 tablet (100 mg total) by mouth daily. 90 tablet 0    simvastatin 20 MG Oral Tab TAKE 1 TABLET BY MOUTH EVERY DAY AT NIGHT 90 tablet 1    DICLOFENAC 75 MG Oral Tab EC TAKE 1 TABLET (75 MG TOTAL) BY MOUTH EVERY 12 (TWELVE) HOURS AS NEEDED. 60 tablet 2    NON FORMULARY Eyelea injection. Intraocular injection every 6 weeks      metoclopramide 5 MG Oral Tab Take 1 tablet (5 mg total) by mouth as needed.      Nystatin 123050 UNIT/GM External Powder Apply 1 Application  topically 3 (three) times daily as needed. 60 g 0    atenolol 25 MG Oral Tab Take 1 tablet (25 mg total) by mouth 2 (two) times daily. 180 tablet 1    Rimegepant Sulfate 75 MG Oral Tablet Dispersible Take 1 tablet by mouth as needed.      pregabalin 25 MG Oral Cap Take 1 capsule (25 mg total) by mouth 2 (two) times daily.      EMGALITY 120 MG/ML Subcutaneous Solution Auto-injector Every 3 weeks      sertraline 100 MG Oral Tab Take 1 tablet (100 mg total) by mouth daily. Taking 1/2 tablet (50mg) BID      Loperamide HCl 2 MG Oral Cap Take 1 capsule (2 mg total) by mouth as needed for Diarrhea.      Ondansetron  HCl (ZOFRAN) 4 mg tablet   5    ClonazePAM 0.5 MG Oral Tab TAKE 1 TABLET BY MOUTH FIVE TIMES DAILY 150 tablet 0    Methylphenidate HCl (RITALIN) 5 MG Oral Tab take 2 by mouth twice daily and 1 by mouth each afternoon. (Patient taking differently: take 2 by mouth in the morning  and 2 at noon, and 1 each afternoon.) 150 tablet 0    diphenhydrAMINE HCl 25 MG Oral Tab Take 1 tablet (25 mg total) by mouth every 6 (six) hours as needed.         PAST MEDICAL, SOCIAL, AND FAMILY HISTORY:  Tobacco:    History   Smoking Status    Never   Smokeless Tobacco    Never       PHYSICAL EXAM:  /90 (BP Location: Right arm, Patient Position: Sitting, Cuff Size: large)   Pulse 63   Temp 97.8 °F (36.6 °C)   Resp 20   Ht 5' 9.5\" (1.765 m)   Wt 296 lb (134.3 kg)   SpO2 97%   BMI 43.08 kg/m²    GENERAL: well developed, well nourished,in no apparent distress  LUNGS: clear to auscultation  CARDIO: RRR without murmur  GI: good BS's,no masses, HSM or tenderness  MUSCULOSKELETAL:  no edema, muscle strength normal.     DATA:  Results for orders placed or performed in visit on 04/12/24   PSA, Total (Screening) [E]   Result Value Ref Range    Prostate Specific Antigen Screen 1.55 <=4.00 ng/mL   CMP in 3 months   Result Value Ref Range    Glucose 102 (H) 70 - 99 mg/dL    Sodium 140 136 - 145 mmol/L    Potassium 4.2 3.5 - 5.1 mmol/L    Chloride 108 98 - 112 mmol/L    CO2 24.0 21.0 - 32.0 mmol/L    Anion Gap 8 0 - 18 mmol/L    BUN 18 9 - 23 mg/dL    Creatinine 1.03 0.70 - 1.30 mg/dL    Calcium, Total 9.3 8.5 - 10.1 mg/dL    Calculated Osmolality 292 275 - 295 mOsm/kg    eGFR-Cr 85 >=60 mL/min/1.73m2    AST 21 15 - 37 U/L    ALT 35 16 - 61 U/L    Alkaline Phosphatase 107 45 - 117 U/L    Bilirubin, Total 0.4 0.1 - 2.0 mg/dL    Total Protein 7.9 6.4 - 8.2 g/dL    Albumin 4.0 3.4 - 5.0 g/dL    Globulin  3.9 2.8 - 4.4 g/dL    A/G Ratio 1.0 1.0 - 2.0    Patient Fasting for CMP? Yes    Lipid in 3 months   Result Value Ref Range    Cholesterol,  Total 153 <200 mg/dL    HDL Cholesterol 34 (L) 40 - 59 mg/dL    Triglycerides 114 30 - 149 mg/dL    LDL Cholesterol 98 <100 mg/dL    VLDL 19 0 - 30 mg/dL    Non HDL Chol 119 <130 mg/dL    Patient Fasting for Lipid? Yes    Hemoglobin A1C in 3 months   Result Value Ref Range    HgbA1C 5.7 (H) <5.7 %    Estimated Average Glucose 117 68 - 126 mg/dL            ASSESSMENT AND PLAN:  1. Essential hypertension  Still elevated.   Increase losartan to 100mg daily.  Continue atenolol.   - losartan 100 MG Oral Tab; Take 1 tablet (100 mg total) by mouth daily.  Dispense: 90 tablet; Refill: 0    2. High cholesterol  Continue statin.     3. Prediabetes  Continue low carb diet and regular exercise.     4. Mood disorder (HCC)  follow up with psych.     5. Physical exam, annual  Do labs prior to cpx.   - CBC With Differential With Platelet; Future  - Comp Metabolic Panel; Future  - Lipid Panel; Future  - Hemoglobin A1C; Future  - TSH W Reflex To Free T4; Future        Return in about 1 month (around 11/2/2024) for physical, bp check.      Karen Doss MD

## 2024-10-15 ENCOUNTER — OFFICE VISIT (OUTPATIENT)
Dept: PAIN CLINIC | Facility: CLINIC | Age: 59
End: 2024-10-15
Payer: COMMERCIAL

## 2024-10-15 VITALS — HEART RATE: 66 BPM | OXYGEN SATURATION: 96 % | SYSTOLIC BLOOD PRESSURE: 132 MMHG | DIASTOLIC BLOOD PRESSURE: 80 MMHG

## 2024-10-15 DIAGNOSIS — M47.817 LUMBOSACRAL SPONDYLOSIS WITHOUT MYELOPATHY: Primary | ICD-10-CM

## 2024-10-15 DIAGNOSIS — M47.816 LUMBAR FACET ARTHROPATHY: ICD-10-CM

## 2024-10-15 PROCEDURE — 3075F SYST BP GE 130 - 139MM HG: CPT | Performed by: NURSE PRACTITIONER

## 2024-10-15 PROCEDURE — 99214 OFFICE O/P EST MOD 30 MIN: CPT | Performed by: NURSE PRACTITIONER

## 2024-10-15 PROCEDURE — 3079F DIAST BP 80-89 MM HG: CPT | Performed by: NURSE PRACTITIONER

## 2024-10-15 NOTE — PATIENT INSTRUCTIONS
Refill policies:    Allow 2-3 business days for refills; controlled substances may take longer.  Contact your pharmacy at least 5 days prior to running out of medication and have them send an electronic request or submit request through the “request refill” option in your [facility] account.  Refills are not addressed on weekends; covering physicians do not authorize routine medications on weekends.  No narcotics or controlled substances are refilled after noon on Fridays or by on call physicians.  By law, narcotics must be electronically prescribed.  A 30 day supply with no refills is the maximum allowed.  If your prescription is due for a refill, you may be due for a follow up appointment.  To best provide you care, patients receiving routine medications need to be seen at least once a year.  Patients receiving narcotic/controlled substance medications need to be seen at least once every 3 months.  In the event that your preferred pharmacy does not have the requested medication in stock (e.g. Backordered), it is your responsibility to find another pharmacy that has the requested medication available.  We will gladly send a new prescription to that pharmacy at your request.    Scheduling Tests:    If your physician has ordered radiology tests such as MRI or CT scans, please contact Central Scheduling at [phone] right away to schedule the test.  Once scheduled, the [facility] Centralized Referral Team will work with your insurance carrier to obtain pre-certification or prior authorization.  Depending on your insurance carrier, approval may take 3-10 days.  It is highly recommended patients assure they have received an authorization before having a test performed.  If test is done without insurance authorization, patient may be responsible for the entire amount billed.      Precertification and Prior Authorizations:  If your physician has recommended that you have a procedure or additional testing performed the [facility]  Centralized Referral Team will contact your insurance carrier to obtain pre-certification or prior authorization.    You are strongly encouraged to contact your insurance carrier to verify that your procedure/test has been approved and is a COVERED benefit.  Although the [facility] Centralized Referral Team does its due diligence, the insurance carrier gives the disclaimer that \"Although the procedure is authorized, this does not guarantee payment.\"    Ultimately the patient is responsible for payment.   Thank you for your understanding in this matter.  Paperwork Completion:  If you require FMLA or disability paperwork for your recovery, please make sure to either drop it off or have it faxed to our office at [phone]. Be sure the form has your name and date of birth on it.  The form will be faxed to our Forms Department and they will complete it for you.  There is a 25$ fee for all forms that need to be filled out.  Please be aware there is a 10-14 day turnaround time.  You will need to sign a release of information (BEATRIZ) form if your paperwork does not come with one.  You may call the Forms Department with any questions at [phone].  Their fax number is [phone].

## 2024-10-15 NOTE — PROGRESS NOTES
Patient presents in office today with reported pain in low back, was having pain in right knee but this has mostly cleared up    Current pain level reported = 2/10    Last reported dose of n/a      Narcotic Contract renewal n/a    Urine Drug screen n/a
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
## 2024-12-09 DIAGNOSIS — M46.1 SACROILIAC INFLAMMATION (HCC): ICD-10-CM

## 2024-12-09 RX ORDER — DICLOFENAC SODIUM 75 MG/1
75 TABLET, DELAYED RELEASE ORAL EVERY 12 HOURS PRN
Qty: 60 TABLET | Refills: 2 | Status: SHIPPED | OUTPATIENT
Start: 2024-12-09

## 2024-12-09 NOTE — TELEPHONE ENCOUNTER
Medication:   DICLOFENAC 75 MG Oral Tab EC     Date of last refill: 7/15/24    Last office visit: 10/15/24  Due back to clinic per last office note:  6 months  Date next office visit scheduled:  None    Last OV note recommendation:    Impression:   Patient is doing well with the nsaids for his back pain/he stopped his baclofen use on a regular basis due to sedative qualities.  He does use Flexeril on occasion but reports he has not used this in quite some time.  He does not need a refill on his diclofenac.  We did review his lumbar MRI today.  It is quite old.  He is not having any leg symptoms however his back pain is slowly progressing.  He has it constantly but in varying degrees.  Discussed with patient updating his lumbar x-rays with flexion-extension views.  He does have some mild endplate changes L4 and L5 however symptoms are not provoked with forward flexion which would make him a candidate for possible Intracept.  His symptoms are more provoked with extension twisting and lateral movements and it was discussed with patient that we would consider medial branch blocks staging towards radiofrequency if his low back becomes more constant and debilitating.         He continues with his neuropathic medication per neurology as he has a history of migraines and that has also helped his back pain.  He feels very stable.      His diclofenac is 75 mg twice daily.  This was recently refilled He denies any side effects from the medication and feels it is very helpful improving his activities of daily living and quality of life.  He also finds that using the diclofenac reduces the severity of his migraines.  We will continue to provide him this medication.            He also continues working with Dr. Patten psychiatry  He otherwise feels stable.  After exam and d/w patient we developed the following plan.  He was reminded he has not had lumbar MBB nor RFA to lumbar MB since 2019 therefore if lumbar pain progresses  without radicular sx we would consider repeating these blocks and RFA.  Patient also had 100% sustained relief in 2019 from right sacroiliac joint injection and RFA would consider repeat.      he is aware and will contact office prn for injections/but f/u for med eval every 6 months/he prefers to continue receiving his nsaid/muscle relaxants from our office  Plan:   > ok to use Flexeril as needed  >continue diclofenac 75mg bid : monitor for bleeding: call for refill/does not need today  >return to clinic every 6 months for med review/otherwise sooner if back pain flares to consider Lumbar MBB and repeat RFA vs. Right SIJ   > Update lumbar x-rays as ordered  > If back pain progresses and is precipitated with forward flexion would update lumbar MRI to evaluate for further endplate changes at L4-5 and discussed with Dr. Lima regarding possible Intracept  No orders of the defined types were placed in this encounter.        Meds & Refills for this Visit:  Requested Prescriptions        No prescriptions requested or ordered in this encounter         Imaging & Consults:  XR LUMBAR SPINE COMPLETE W/FLEX + EXT (CPT=72114)     The patient indicates understanding of these issues and agrees to the plan.        ID#[id number]

## 2024-12-21 DIAGNOSIS — I10 ESSENTIAL HYPERTENSION: ICD-10-CM

## 2024-12-22 RX ORDER — LOSARTAN POTASSIUM 25 MG/1
25 TABLET ORAL DAILY
Qty: 90 TABLET | Refills: 1 | OUTPATIENT
Start: 2024-12-22

## 2024-12-22 RX ORDER — ATENOLOL 25 MG/1
25 TABLET ORAL 2 TIMES DAILY
Qty: 180 TABLET | Refills: 0 | Status: SHIPPED | OUTPATIENT
Start: 2024-12-22

## 2024-12-22 NOTE — TELEPHONE ENCOUNTER
Hypertension Medications Protocol [id number]/21/2024 01:01 AM   Protocol Details CMP or BMP in past 12 months    Last BP reading less than 140/90    In person appointment or virtual visit in the past 12 mos or appointment in next 3 mos    EGFRCR or GFRNAA > 50      1. Essential hypertension  Still elevated.   Increase losartan to 100mg daily.  Continue atenolol.   Future Appointments   Date Time Provider Department Center   1/10/2025 10:40 AM Karen Doss MD [facility] [address]

## 2025-03-03 ENCOUNTER — LAB ENCOUNTER (OUTPATIENT)
Dept: LAB | Age: 60
End: 2025-03-03
Attending: INTERNAL MEDICINE
Payer: COMMERCIAL

## 2025-03-03 DIAGNOSIS — Z00.00 PHYSICAL EXAM, ANNUAL: ICD-10-CM

## 2025-03-03 DIAGNOSIS — Z12.11 SCREENING FOR COLON CANCER: ICD-10-CM

## 2025-03-03 LAB
ALBUMIN SERPL-MCNC: 4.7 G/DL (ref 3.2–4.8)
ALBUMIN/GLOB SERPL: 1.7 {RATIO} (ref 1–2)
ALP LIVER SERPL-CCNC: 116 U/L
ALT SERPL-CCNC: 33 U/L
ANION GAP SERPL CALC-SCNC: 6 MMOL/L (ref 0–18)
AST SERPL-CCNC: 24 U/L (ref ?–34)
BASOPHILS # BLD AUTO: 0.09 X10(3) UL (ref 0–0.2)
BASOPHILS NFR BLD AUTO: 1 %
BILIRUB SERPL-MCNC: 0.3 MG/DL (ref 0.3–1.2)
BUN BLD-MCNC: 17 MG/DL (ref 9–23)
CALCIUM BLD-MCNC: 9.8 MG/DL (ref 8.7–10.6)
CHLORIDE SERPL-SCNC: 105 MMOL/L (ref 98–112)
CHOLEST SERPL-MCNC: 165 MG/DL (ref ?–200)
CO2 SERPL-SCNC: 30 MMOL/L (ref 21–32)
CREAT BLD-MCNC: 0.99 MG/DL
EGFRCR SERPLBLD CKD-EPI 2021: 89 ML/MIN/1.73M2 (ref 60–?)
EOSINOPHIL # BLD AUTO: 0.28 X10(3) UL (ref 0–0.7)
EOSINOPHIL NFR BLD AUTO: 3.1 %
ERYTHROCYTE [DISTWIDTH] IN BLOOD BY AUTOMATED COUNT: 12.7 %
EST. AVERAGE GLUCOSE BLD GHB EST-MCNC: 120 MG/DL (ref 68–126)
FASTING PATIENT LIPID ANSWER: YES
FASTING STATUS PATIENT QL REPORTED: YES
GLOBULIN PLAS-MCNC: 2.8 G/DL (ref 2–3.5)
GLUCOSE BLD-MCNC: 80 MG/DL (ref 70–99)
HBA1C MFR BLD: 5.8 % (ref ?–5.7)
HCT VFR BLD AUTO: 48 %
HDLC SERPL-MCNC: 34 MG/DL (ref 40–59)
HGB BLD-MCNC: 15.9 G/DL
IMM GRANULOCYTES # BLD AUTO: 0.03 X10(3) UL (ref 0–1)
IMM GRANULOCYTES NFR BLD: 0.3 %
LDLC SERPL CALC-MCNC: 85 MG/DL (ref ?–100)
LYMPHOCYTES # BLD AUTO: 2.19 X10(3) UL (ref 1–4)
LYMPHOCYTES NFR BLD AUTO: 24 %
MCH RBC QN AUTO: 32.1 PG (ref 26–34)
MCHC RBC AUTO-ENTMCNC: 33.1 G/DL (ref 31–37)
MCV RBC AUTO: 96.8 FL
MONOCYTES # BLD AUTO: 0.72 X10(3) UL (ref 0.1–1)
MONOCYTES NFR BLD AUTO: 7.9 %
NEUTROPHILS # BLD AUTO: 5.81 X10 (3) UL (ref 1.5–7.7)
NEUTROPHILS # BLD AUTO: 5.81 X10(3) UL (ref 1.5–7.7)
NEUTROPHILS NFR BLD AUTO: 63.7 %
NONHDLC SERPL-MCNC: 131 MG/DL (ref ?–130)
OSMOLALITY SERPL CALC.SUM OF ELEC: 293 MOSM/KG (ref 275–295)
PLATELET # BLD AUTO: 203 10(3)UL (ref 150–450)
POTASSIUM SERPL-SCNC: 4 MMOL/L (ref 3.5–5.1)
PROT SERPL-MCNC: 7.5 G/DL (ref 5.7–8.2)
RBC # BLD AUTO: 4.96 X10(6)UL
SODIUM SERPL-SCNC: 141 MMOL/L (ref 136–145)
TRIGL SERPL-MCNC: 279 MG/DL (ref 30–149)
TSI SER-ACNC: 2.43 UIU/ML (ref 0.55–4.78)
VLDLC SERPL CALC-MCNC: 45 MG/DL (ref 0–30)
WBC # BLD AUTO: 9.1 X10(3) UL (ref 4–11)

## 2025-03-03 PROCEDURE — 83036 HEMOGLOBIN GLYCOSYLATED A1C: CPT | Performed by: INTERNAL MEDICINE

## 2025-03-03 PROCEDURE — 80050 GENERAL HEALTH PANEL: CPT | Performed by: INTERNAL MEDICINE

## 2025-03-03 PROCEDURE — 80061 LIPID PANEL: CPT | Performed by: INTERNAL MEDICINE
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
## 2025-03-05 ENCOUNTER — OFFICE VISIT (OUTPATIENT)
Dept: INTERNAL MEDICINE CLINIC | Facility: CLINIC | Age: 60
End: 2025-03-05
Payer: COMMERCIAL

## 2025-03-05 VITALS
HEART RATE: 50 BPM | WEIGHT: 296 LBS | HEIGHT: 69.5 IN | BODY MASS INDEX: 42.85 KG/M2 | TEMPERATURE: 98 F | DIASTOLIC BLOOD PRESSURE: 84 MMHG | SYSTOLIC BLOOD PRESSURE: 136 MMHG | RESPIRATION RATE: 20 BRPM | OXYGEN SATURATION: 95 %

## 2025-03-05 DIAGNOSIS — E78.00 HIGH CHOLESTEROL: ICD-10-CM

## 2025-03-05 DIAGNOSIS — Z23 NEED FOR VACCINATION: ICD-10-CM

## 2025-03-05 DIAGNOSIS — Z12.11 SCREENING FOR COLON CANCER: ICD-10-CM

## 2025-03-05 DIAGNOSIS — Z00.00 PHYSICAL EXAM, ANNUAL: ICD-10-CM

## 2025-03-05 DIAGNOSIS — D22.9 NUMEROUS MOLES: ICD-10-CM

## 2025-03-05 DIAGNOSIS — Z12.5 SCREENING FOR PROSTATE CANCER: ICD-10-CM

## 2025-03-05 DIAGNOSIS — R73.03 PREDIABETES: ICD-10-CM

## 2025-03-05 PROCEDURE — 99214 OFFICE O/P EST MOD 30 MIN: CPT | Performed by: INTERNAL MEDICINE

## 2025-03-05 PROCEDURE — 90656 IIV3 VACC NO PRSV 0.5 ML IM: CPT | Performed by: INTERNAL MEDICINE

## 2025-03-05 PROCEDURE — G2211 COMPLEX E/M VISIT ADD ON: HCPCS | Performed by: INTERNAL MEDICINE

## 2025-03-05 PROCEDURE — 3079F DIAST BP 80-89 MM HG: CPT | Performed by: INTERNAL MEDICINE

## 2025-03-05 PROCEDURE — 3075F SYST BP GE 130 - 139MM HG: CPT | Performed by: INTERNAL MEDICINE

## 2025-03-05 PROCEDURE — 90677 PCV20 VACCINE IM: CPT | Performed by: INTERNAL MEDICINE

## 2025-03-05 PROCEDURE — 3008F BODY MASS INDEX DOCD: CPT | Performed by: INTERNAL MEDICINE

## 2025-03-05 PROCEDURE — 90471 IMMUNIZATION ADMIN: CPT | Performed by: INTERNAL MEDICINE

## 2025-03-05 PROCEDURE — 90472 IMMUNIZATION ADMIN EACH ADD: CPT | Performed by: INTERNAL MEDICINE

## 2025-03-05 PROCEDURE — 99396 PREV VISIT EST AGE 40-64: CPT | Performed by: INTERNAL MEDICINE

## 2025-03-05 RX ORDER — PRAZOSIN HYDROCHLORIDE 1 MG/1
1 CAPSULE ORAL NIGHTLY
COMMUNITY
Start: 2025-01-22

## 2025-03-07 DIAGNOSIS — E78.00 HIGH CHOLESTEROL: ICD-10-CM

## 2025-03-10 RX ORDER — SIMVASTATIN 20 MG
TABLET ORAL
Qty: 90 TABLET | Refills: 1 | Status: SHIPPED | OUTPATIENT
Start: 2025-03-10

## 2025-03-10 NOTE — TELEPHONE ENCOUNTER
Last OV relevant to medication: 3/5/2025  Last refill date: 8/6     #/refills: 90+1  When pt was asked to return for OV: 6 mo  Upcoming appt/reason: 9/5/2025  Was pt informed of any over due labs:   Lab Results   Component Value Date    CHOLEST 165 03/03/2025    TRIG 279 (H) 03/03/2025    HDL 34 (L) 03/03/2025    LDL 85 03/03/2025    VLDL 45 (H) 03/03/2025    TCHDLRATIO 5.75 (H) 02/14/2017    NONHDLC 131 (H) 03/03/2025

## 2025-03-16 DIAGNOSIS — M46.1 SACROILIAC INFLAMMATION: ICD-10-CM

## 2025-03-17 RX ORDER — DICLOFENAC SODIUM 75 MG/1
75 TABLET, DELAYED RELEASE ORAL EVERY 12 HOURS PRN
Qty: 60 TABLET | Refills: 2 | Status: SHIPPED | OUTPATIENT
Start: 2025-03-17

## 2025-03-17 NOTE — TELEPHONE ENCOUNTER
Medication:   DICLOFENAC 75 MG Oral Tab EC     Date of last refill: 12/9/24    Last office visit: 10/15/24  Due back to clinic per last office note:  6 months  Date next office visit scheduled:  None    Last OV note recommendation: Plan:   > ok to use Flexeril as needed  >continue diclofenac 75mg bid : monitor for bleeding: call for refill/does not need today  >return to clinic every 6 months for med review/otherwise sooner if back pain flares to consider Lumbar MBB and repeat RFA vs. Right SIJ   > Update lumbar x-rays as ordered  > If back pain progresses and is precipitated with forward flexion would update lumbar MRI to evaluate for further endplate changes at L4-5 and discussed with Dr. Lima regarding possible Intracept  No orders of the defined types were placed in this encounter.

## 2025-03-19 DIAGNOSIS — I10 ESSENTIAL HYPERTENSION: ICD-10-CM

## 2025-03-20 RX ORDER — ATENOLOL 25 MG/1
25 TABLET ORAL 2 TIMES DAILY
Qty: 180 TABLET | Refills: 1 | Status: SHIPPED | OUTPATIENT
Start: 2025-03-20

## 2025-03-20 NOTE — TELEPHONE ENCOUNTER
Hypertension Medications Protocol Nalayn1103/19/2025 12:11 AM   Protocol Details CMP or BMP in past 12 months    Last BP reading less than 140/90    In person appointment or virtual visit in the past 12 mos or appointment in next 3 mos    EGFRCR or GFRNAA > 50    Medication is active on med list      On meds for htn, hyperlipidemia.   Future Appointments   Date Time Provider Department Center   9/5/2025 12:40 PM Karen Doss MD [facility] [address] [place]

## 2025-03-29 ENCOUNTER — HOSPITAL ENCOUNTER (OUTPATIENT)
Dept: GENERAL RADIOLOGY | Facility: HOSPITAL | Age: 60
Discharge: HOME | End: 2025-03-29
Attending: NURSE PRACTITIONER
Payer: COMMERCIAL

## 2025-03-29 DIAGNOSIS — M47.817 LUMBOSACRAL SPONDYLOSIS WITHOUT MYELOPATHY: ICD-10-CM

## 2025-03-29 DIAGNOSIS — M47.816 LUMBAR FACET ARTHROPATHY: ICD-10-CM

## 2025-03-29 PROCEDURE — 72114 X-RAY EXAM L-S SPINE BENDING: CPT | Performed by: NURSE PRACTITIONER

## 2025-06-12 DIAGNOSIS — I10 ESSENTIAL HYPERTENSION: ICD-10-CM

## 2025-06-13 RX ORDER — LOSARTAN POTASSIUM 100 MG/1
100 TABLET ORAL DAILY
Qty: 90 TABLET | Refills: 0 | Status: SHIPPED | OUTPATIENT
Start: 2025-06-13

## 2025-06-13 NOTE — TELEPHONE ENCOUNTER
Hypertension Medications Protocol Dkubuj9406/12/2025 09:29 AM   Protocol Details CMP or BMP in past 12 months    Last BP reading less than 140/90    In person appointment or virtual visit in the past 12 mos or appointment in next 3 mos    EGFRCR or GFRNAA > 50    Medication is active on med list      8. Htn  Elevated but came down upon recheck.   Check bp at home and bring in readings to next visit.  Future Appointments   Date Time Provider Department Center   9/5/2025 12:40 PM Karen Doss MD [facility] [address] [place]

## 2025-06-16 ENCOUNTER — TELEPHONE (OUTPATIENT)
Dept: INTERNAL MEDICINE CLINIC | Facility: CLINIC | Age: 60
End: 2025-06-16

## 2025-06-19 DIAGNOSIS — M46.1 SACROILIAC INFLAMMATION: ICD-10-CM

## 2025-06-19 RX ORDER — DICLOFENAC SODIUM 75 MG/1
75 TABLET, DELAYED RELEASE ORAL EVERY 12 HOURS PRN
Qty: 60 TABLET | Refills: 2 | Status: SHIPPED | OUTPATIENT
Start: 2025-06-19

## 2025-06-19 NOTE — TELEPHONE ENCOUNTER
Medication: DICLOFENAC 75 MG     [place]    Last office visit: 10/15/24  Due back to clinic per last office note:  6 months  Date next office visit scheduled:  06/24/25        Last OV note recommendation: Plan:   > ok to use Flexeril as needed  >continue diclofenac 75mg bid : monitor for bleeding: call for refill/does not need today  >return to clinic every 6 months for med review/otherwise sooner if back pain flares to consider Lumbar MBB and repeat RFA vs. Right SIJ   > Update lumbar x-rays as ordered  > If back pain progresses and is precipitated with forward flexion would update lumbar MRI to evaluate for further endplate changes at L4-5 and discussed with Dr. Lima regarding possible Intracept  No orders of the defined types were placed in this encounter.

## 2025-07-08 ENCOUNTER — OFFICE VISIT (OUTPATIENT)
Dept: PAIN CLINIC | Facility: CLINIC | Age: 60
End: 2025-07-08
Payer: COMMERCIAL

## 2025-07-08 VITALS
BODY MASS INDEX: 43 KG/M2 | HEART RATE: 52 BPM | OXYGEN SATURATION: 94 % | WEIGHT: 294 LBS | SYSTOLIC BLOOD PRESSURE: 130 MMHG | DIASTOLIC BLOOD PRESSURE: 78 MMHG

## 2025-07-08 DIAGNOSIS — M47.817 LUMBOSACRAL SPONDYLOSIS WITHOUT MYELOPATHY: Primary | ICD-10-CM

## 2025-07-08 DIAGNOSIS — M47.816 FACET ARTHROPATHY, LUMBAR: ICD-10-CM

## 2025-07-08 DIAGNOSIS — M47.816 LUMBAR FACET ARTHROPATHY: ICD-10-CM

## 2025-07-08 PROCEDURE — 3078F DIAST BP <80 MM HG: CPT | Performed by: NURSE PRACTITIONER

## 2025-07-08 PROCEDURE — 99214 OFFICE O/P EST MOD 30 MIN: CPT | Performed by: NURSE PRACTITIONER

## 2025-07-08 PROCEDURE — 3075F SYST BP GE 130 - 139MM HG: CPT | Performed by: NURSE PRACTITIONER

## 2025-07-08 NOTE — PROGRESS NOTES
Patient presents in office today with reported Increased pain and  go over imaging     Current pain level reported = 1-2/10    Last reported dose of Diclofenac was last taken this  morning       Narcotic Contract renewal n/a    Urine Drug screen n/a

## 2025-07-08 NOTE — PATIENT INSTRUCTIONS
Refill policies:    Allow 2-3 business days for refills; controlled substances may take longer.  Contact your pharmacy at least 5 days prior to running out of medication and have them send an electronic request or submit request through the “request refill” option in your [employer] account.  Refills are not addressed on weekends; covering physicians do not authorize routine medications on weekends.  No narcotics or controlled substances are refilled after noon on Fridays or by on call physicians.  By law, narcotics must be electronically prescribed.  A 30 day supply with no refills is the maximum allowed.  If your prescription is due for a refill, you may be due for a follow up appointment.  To best provide you care, patients receiving routine medications need to be seen at least once a year.  Patients receiving narcotic/controlled substance medications need to be seen at least once every 3 months.  In the event that your preferred pharmacy does not have the requested medication in stock (e.g. Backordered), it is your responsibility to find another pharmacy that has the requested medication available.  We will gladly send a new prescription to that pharmacy at your request.    Scheduling Tests:    If your physician has ordered radiology tests such as MRI or CT scans, please contact Central Scheduling at [phone] right away to schedule the test.  Once scheduled, the [facility] Centralized Referral Team will work with your insurance carrier to obtain pre-certification or prior authorization.  Depending on your insurance carrier, approval may take 3-10 days.  It is highly recommended patients assure they have received an authorization before having a test performed.  If test is done without insurance authorization, patient may be responsible for the entire amount billed.      Precertification and Prior Authorizations:  If your physician has recommended that you have a procedure or additional testing performed the [facility]  Centralized Referral Team will contact your insurance carrier to obtain pre-certification or prior authorization.    You are strongly encouraged to contact your insurance carrier to verify that your procedure/test has been approved and is a COVERED benefit.  Although the [facility] Centralized Referral Team does its due diligence, the insurance carrier gives the disclaimer that \"Although the procedure is authorized, this does not guarantee payment.\"    Ultimately the patient is responsible for payment.   Thank you for your understanding in this matter.  Paperwork Completion:  If you require FMLA or disability paperwork for your recovery, please make sure to either drop it off or have it faxed to our office at [phone]. Be sure the form has your name and date of birth on it.  The form will be faxed to our Forms Department and they will complete it for you.  There is a 25$ fee for all forms that need to be filled out.  Please be aware there is a 10-14 day turnaround time.  You will need to sign a release of information (BEATRIZ) form if your paperwork does not come with one.  You may call the Forms Department with any questions at [phone].  Their fax number is [phone].
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
## 2025-07-25 ENCOUNTER — LAB ENCOUNTER (OUTPATIENT)
Dept: LAB | Age: 60
End: 2025-07-25
Attending: INTERNAL MEDICINE
Payer: COMMERCIAL

## 2025-07-25 DIAGNOSIS — R73.03 PREDIABETES: ICD-10-CM

## 2025-07-25 DIAGNOSIS — Z12.5 SCREENING FOR PROSTATE CANCER: ICD-10-CM

## 2025-07-25 LAB
COMPLEXED PSA SERPL-MCNC: 1.13 NG/ML (ref ?–4)
INSULIN SERPL-ACNC: 18 MU/L (ref 3–25)

## 2025-07-25 PROCEDURE — 83525 ASSAY OF INSULIN: CPT

## 2025-07-25 PROCEDURE — 36415 COLL VENOUS BLD VENIPUNCTURE: CPT

## (undated) DIAGNOSIS — M47.816 FACET ARTHROPATHY, LUMBAR: ICD-10-CM

## (undated) DIAGNOSIS — M46.1 SACROILIITIS (HCC): ICD-10-CM

## (undated) DIAGNOSIS — I10 ESSENTIAL HYPERTENSION: ICD-10-CM

## (undated) DIAGNOSIS — M46.1 SACROILIAC INFLAMMATION (HCC): ICD-10-CM

## (undated) DEVICE — PAIN TRAY: Brand: MEDLINE INDUSTRIES, INC.

## (undated) DEVICE — REMOVER DURAPREP 3M

## (undated) DEVICE — GLOVE SURG SENSICARE SZ 6-1/2

## (undated) DEVICE — BANDAID COVERLET 1X3

## (undated) DEVICE — GLOVE SURG SENSICARE SZ 7-1/2

## (undated) DEVICE — MARKER PRE-SURGICAL MINI DISP

## (undated) DEVICE — GLOVE SURG SENSICARE SZ 7

## (undated) DEVICE — SYRINGE 10ML LL CONTRL SYRINGE

## (undated) DEVICE — NEEDLE SPINAL 22X5 405148

## (undated) DEVICE — RF CANNULA, CVD: Brand: VENOM

## (undated) DEVICE — DRAPE C-ARM UNIVERSAL

## (undated) DEVICE — TOWEL OR BLU 16X26 STRL

## (undated) DEVICE — SHEET, T, LAPAROTOMY, STERILE: Brand: MEDLINE

## (undated) DEVICE — STANDARD HYPODERMIC NEEDLE,POLYPROPYLENE HUB: Brand: MONOJECT

## (undated) DEVICE — NEEDLE SPINAL 22X3-1/2 BLK

## (undated) NOTE — LETTER
Patient Name: Alda Szymanski  YOB: 1965          MRN number:  [id number]  Date:  7/24/2017  Referring Physician:  Ez Ridley     Discharge Summary    Pt has attended 8,  visits in Physical Therapy. Subjective: feels more flexible.  Able t

## (undated) NOTE — ED AVS SNAPSHOT
[facility] Emergency Department    [facility] [address] [address]    Phone:  [phone]    Fax:  [phone]           Mr. Kirsten Szymanski   MRN: [id number]    Department:  [facility] Emergency Department   Date of Visit:  4/5 You can get these medications from any pharmacy     Bring a paper prescription for each of these medications    - HYDROcodone-acetaminophen  MG Tabs            Discharge References/Attachments     BACK CARE TIPS (ENGLISH)    SCIATICA (ENGLISH)      D IF THERE IS ANY CHANGE OR WORSENING OF YOUR CONDITION, CALL YOUR PRIMARY CARE PHYSICIAN AT ONCE OR RETURN IMMEDIATELY TO THE EMERGENCY DEPARTMENT.     If you have been prescribed any medication(s), please fill your prescription right away and begin taking t Patient [address] to help you get signed up for insurance coverage. Patient [address] is a Federal Navigator program that can help with your Affordable Care Act coverage, as well as all types of Medicaid plans.   To get signed up and covere

## (undated) NOTE — LETTER
05/22/18        [address] [id number]      Dear Yelena Cartwright,    Our records indicate that you have outstanding lab work and or testing that was ordered for you and has not yet been completed:          CBC With Diff      CMP

## (undated) NOTE — ED AVS SNAPSHOT
[facility] Emergency Department    [facility] [address] [address]    Phone:  [phone]    Fax:  [phone]           Mr. Julius Szymanski   MRN: [id number]    Department:  [facility] Emergency Department   Date of Visit:  4/5 IF THERE IS ANY CHANGE OR WORSENING OF YOUR CONDITION, CALL YOUR PRIMARY CARE PHYSICIAN AT ONCE OR RETURN IMMEDIATELY TO THE EMERGENCY DEPARTMENT.     If you have been prescribed any medication(s), please fill your prescription right away and begin taking t

## (undated) NOTE — LETTER
Patient Name: Darlin Szymanski  YOB: 1965          MRN number:  [id number]  Date:  6/23/2017  Referring Physician:  Fátima Ames         SPINE EVALUATION:    Referring Physician: Dr. Jarad Rice: lumbar radiculitis Date of Service: 6/23/2017 Extension: 45  Sidebending: R 30; L 30  Rotation: mildly restricted B    Palpation:no acute tenderness to palpation  Strength: LE myotomes 5/5 B    Piriformis extensibility limted B in part due to soft tissue compression in test positions    Special tests: Goal status G Code: 1113FM    Patient/Family/Caregiver was advised of these findings, precautions, and treatment options and has agreed to actively participate in planning and for this course of care.     Thank you for your referral. Please co-sign or sign

## (undated) NOTE — IP AVS SNAPSHOT
[facility] [place] [address] [place], [address] ~ [phone]                Discharge Summary   6/5/2017    Mr.  [address] [facility]           Admission Information        Provider Department    6/5/2017 Umer Talbert MD  Anthony Borrego / Stephanie Johansen [    ]    [    ]       Methylphenidate HCl 5 MG Tabs   Commonly known as:  RITALIN        take 2 by mouth twice daily and 1 by mouth each afternoon.     Corina Brown     [    ]    [    ]    [    ]    [    ]       Omega-3-acid Ethyl Esters 1 g Caps   Commonl 9. Lightheadedness or nausea may occur and should resolve within twenty-four  hours. 10.  You may experience a sedative effect that affects your bladder.  Even if you do not feel the urge to urinate, make sure that you do within eight hours of the procedur Medicaid plans. To get signed up and covered, please call [phone] and ask to get set up for an insurance coverage that is in-network with [facility].         Visit Information        Department Dept Phone    6/5/2017 12:00 PM  Pre-Op /

## (undated) NOTE — MR AVS SNAPSHOT
[address] [id number] [phone]               Thank you for choosing us for your health care visit with Deartatiana Webb MD.  We are glad to serve you and happy to provide you with artificial sweetners cause gastric distress    Persimmons-Hives, rash    Pollen                 Today's Vital Signs     BP Pulse Temp Height Weight BMI    110/70 mmHg 64 97.5 °F (36.4 °C) (Oral) 69\" 303 lb 11.2 oz 44.83 kg/m2         Current Medications Take 1 tablet (500 mg total) by mouth 2 (two) times daily as needed. Commonly known as:  NAPROSYN           Omega-3-acid Ethyl Esters 1 g Caps   TAKE 1 CAPSULE (1 G TOTAL) BY MOUTH 2 (TWO) TIMES DAILY.    Commonly known as:  LOVAZA           Ondansetron H

## (undated) NOTE — LETTER
7/3/2024       Audie BECKMAN Minor   [address] [facility] [address]      Dear Audie,    IF YOU ARE 50 YEARS OF AGE OR OLDER, COLORECTAL CANCER SCREENING CAN SAVE YOUR LIFE.    As your primary care doctor, I want to do everything I can to protect your health.  Colorectal cancer is the second leading cause of cancer-related deaths in the [place].  Polyps and colorectal cancer do not always cause symptoms.  That's why screening is so important.  Regular screening can find colorectal cancer early when it is most curable.  These tests can help prevent the development of colorectal cancer.  All adults 50 and older should have one of the following colon cancer screenings as recommended by the American Cancer Society:    Colonoscopy every ten years or as advised by your physician  iFOBT every year (The iFOBT is a home test to detect blood in the stool. The test is quick, easy and requires no dietary restrictions.)    Please contact my office today so together we can determine which colorectal cancer screening is best for you.  Or, if you have already had one of the above colon cancer screenings, please let me know the date, method of screening, physician and/or facility that performed the screening so I can update your medical record.      If you have a history of colon cancer, colon polyps, or an abnormal colon screening result, please follow the instructions you have previously received from myself or your specialists.    There are no excuses to ignore early detection!  This is one cancer you can prevent.  Regular exams and preventive screenings are among the most important things you can do.  Thank you for taking time to take care of yourself.        Dr. Karen Doss MD   [phone]

## (undated) NOTE — LETTER
01/13/22    [address] [address]    Dear Shukri Staff,    Our records indicate that you have outstanding lab work. To schedule an appointment please call Central Scheduling at [phone].  You may also schedule on [facility]

## (undated) NOTE — MR AVS SNAPSHOT
[address] [id number] [phone]               Thank you for choosing us for your health care visit with Rupinder Bacon MD.  We are glad to serve you and happy to provide you with procedure. IF A CHILD is scheduled for this procedure, parents should be advised that they will not be able to accompany the child in the testing room.  Parents will remain in the MRI waiting area and be reunited with the child upon completion of the MR Commonly known as:  NORCO           LYRICA 25 MG Caps   Generic drug:  pregabalin   Take 25 mg by mouth.  Take 1 tablet every 3 hours while awake  Up to 5 tablets per day           Methylphenidate HCl 5 MG Tabs   take 2 by mouth twice daily and 1 by mouth [email or web address]

## (undated) NOTE — MR AVS SNAPSHOT
[address] [address] [phone]               Thank you for choosing us for your health care visit with Elizabeth Rashid MD.  We are glad to serve you and happy to provide you with Generic drug:  Gabapentin (Once-Daily)   TAKE ONE CAPSULE BY MOUTH EVERY EVENING WITH DINNER SWALLOW WHOLE DO NOT CHEW,CRUSH,OR SPLIT           HYDROcodone-acetaminophen  MG Tabs   Take 1-2 tablets by mouth every 4 (four) hours as needed for Pain.

## (undated) NOTE — LETTER
6/16/2025       Audie BECKMAN Minor   [address] [facility] [address]      Dear Audie,    IF YOU ARE 50 YEARS OF AGE OR OLDER, COLORECTAL CANCER SCREENING CAN SAVE YOUR LIFE.    As your primary care doctor, I want to do everything I can to protect your health.  Colorectal cancer is the second leading cause of cancer-related deaths in the [place].  Polyps and colorectal cancer do not always cause symptoms.  That's why screening is so important.  Regular screening can find colorectal cancer early when it is most curable.  These tests can help prevent the development of colorectal cancer.  All adults 50 and older should have one of the following colon cancer screenings as recommended by the American Cancer Society:    Colonoscopy every ten years or as advised by your physician  iFOBT every year (The iFOBT is a home test to detect blood in the stool. The test is quick, easy and requires no dietary restrictions.)    Please contact my office today so together we can determine which colorectal cancer screening is best for you.  Or, if you have already had one of the above colon cancer screenings, please let me know the date, method of screening, physician and/or facility that performed the screening so I can update your medical record.      If you have a history of colon cancer, colon polyps, or an abnormal colon screening result, please follow the instructions you have previously received from myself or your specialists.    There are no excuses to ignore early detection!  This is one cancer you can prevent.  Regular exams and preventive screenings are among the most important things you can do.  Thank you for taking time to take care of yourself.        Dr. Karen Doss MD   [phone]

## (undated) NOTE — LETTER
06/27/22        [address] [facility]  [place] [address]      Dear Bartolome Gilmore,    This is a friendly reminder to complete your Colon Cancer Screening. To complete your lab, please follow the instruction in the kit provided to you during your office visit. Occult Blood, Fecal, Immunoassay (Blue cards)     To provide you with the best possible care, please complete these orders at your earliest convenience. If you have recently completed these orders please disregard this letter. If you have any questions please call the office at Dept: [phone].      Thank you,       Dr. Jonathon Levin MD

## (undated) NOTE — Clinical Note
Thank you for the consult. I saw Ms. Szymanski in the endocrine/diabetes clinic today. Please see attached my note. Please feel free to contact me with any questions. Thanks!

## (undated) NOTE — LETTER
[facility]  Tessa Kay 63 [address] [facility], [address] [place] [address]    Consent for Operation    Date: __________________    Time: _______________    1.  I authorize the performance upon Audie Szymanski the following operation:    Procedure(s):  BILATERAL TRANSFORAMINA procedure has been videotaped, the surgeon will obtain the original videotape. The hospital will not be responsible for storage or maintenance of this tape.     6. For the purpose of advancing medical education, I consent to the admittance of observers to t STATEMENTS REQUIRING INSERTION OR COMPLETION WERE FILLED IN.     Signature of Patient:   ___________________________    When the patient is a minor or mentally incompetent to give consent:  Signature of person authorized to consent for patient: ____________ drugs/illegal medications). Failure to inform my anesthesiologist about these medicines may increase my risk of anesthetic complications. · If I am allergic to anything or have had a reaction to anesthesia before.     3. I understand how the anesthesia med I have discussed the procedure and information above with the patient (or patient’s representative) and answered their questions. The patient or their representative has agreed to have anesthesia services.     _______________________________________________

## (undated) NOTE — LETTER
19    Patient: Marianne Szymanski  : 10/20/1965 Visit date: 1/10/2019    Dear  Dr. Jarad Barrett MD,    Thank you for referring Marianne Szymanski to my practice. Please find my assessment and plan below.                 Assessment   Calculus of gallbladder without c He has some issues with lactose intolerance, but only with very large amounts of dairy. He otherwise denies any fatty food intolerance. The patient's family history is significant for gallbladder disease.   His mother and 5 of his 6 sisters have all had

## (undated) NOTE — ED AVS SNAPSHOT
[facility] Emergency Department    [place] [address] [address]    Phone:  [phone]    Fax:  [phone]           Mr. Bree Avalos Stephon   MRN: [id number]    Department:  [facility] Emergency Department   Date of Visit:  4/2 IF THERE IS ANY CHANGE OR WORSENING OF YOUR CONDITION, CALL YOUR PRIMARY CARE PHYSICIAN AT ONCE OR RETURN IMMEDIATELY TO THE EMERGENCY DEPARTMENT.     If you have been prescribed any medication(s), please fill your prescription right away and begin taking t

## (undated) NOTE — ED AVS SNAPSHOT
[facility] Emergency Department    [facility] [address] [address]    Phone:  [phone]    Fax:  [phone]           Mr. Casandra Szymanski   MRN: [id number]    Department:  [facility] Emergency Department   Date of Visit:  4/2 Followup with primary care as you may need further testing and/or treatment such as physical therapy or MRI if your symptoms do not improve.     Discharge References/Attachments     [employer] (ENGLISH)      Disclosure     Insurance plans vary and the physici CARE PHYSICIAN AT ONCE OR RETURN IMMEDIATELY TO THE EMERGENCY DEPARTMENT.     If you have been prescribed any medication(s), please fill your prescription right away and begin taking the medication(s) as directed    If the emergency physician has read X-ray coverage. Patient [address] is a Federal Navigator program that can help with your Affordable Care Act coverage, as well as all types of Medicaid plans.   To get signed up and covered, please call [phone] and ask to get set up for an insuran that radiates into right leg. Was getting out of the car and felt his back pull. FINDINGS: Osteophytes are noted at the L5-S1 junction. Mild disc space narrowing at L4-5. Vertebral height otherwise within normal limits.  Small anterior osteophytes

## (undated) NOTE — MR AVS SNAPSHOT
[address] [address] [phone]               Thank you for choosing us for your health care visit with Brian Venegas MD.  We are glad to serve you and happy to provide you with CHEW,CRUSH,OR SPLIT           LYRICA 25 MG Caps   Generic drug:  pregabalin   Take 25 mg by mouth.  Take 1 tablet every 3 hours while awake  Up to 5 tablets per day           * Methylphenidate HCl 5 MG Tabs   take 2 by mouth twice daily and 1 by mouth each visit,  view other health information, and more. To sign up or find more information, go to [email or web address] and click on the Sign Up Now link in the [employer] box.      Enter your [employer] Mobile Activation Code exactly as it appears below along with yo Don’t forget strength training with weights and resistance Set goals and track your progress   You don’t need to join a gym. Home exercises work great.  Put more priority on exercise in your life                    Visit [facility] online at

## (undated) NOTE — Clinical Note
06/19/2017        Audie Brice [phone]    10/20/1965     Dear Lindsey Lake,    [address] [facility] records indicate that you have outstanding lab work and or testing that was ordered for you and has not yet been completed:      Lipid Panel

## (undated) NOTE — MR AVS SNAPSHOT
[facility], [address] [phone]               Thank you for choosing us for your health care visit with Terrell Blakely PA-C.   We are glad to serve you and happy to provide you with this jennings ? Refills are not addressed on weekends; covering physicians do not authorize routine medications on weekends. ? No narcotics or controlled substances are refilled after noon on Fridays or by on call physicians.   ? By law, narcotics cannot be faxed or zhanna approved and is a COVERED benefit. Although the [facility] staff does its due diligence, the insurance carrier gives the disclaimer that \"Although the procedure is authorized, this does not guarantee payment. \"    Ultimately the patient is responsible for payme Omega-3-acid Ethyl Esters 1 g Caps   TAKE 1 CAPSULE (1 G TOTAL) BY MOUTH 2 (TWO) TIMES DAILY.    Commonly known as:  LOVAZA           Ondansetron HCl 4 mg tablet   TK 1 T PO Q 8 H   Commonly known as:  ZOFRAN           Sertraline HCl 100 MG Tabs   TAKE 1 T